# Patient Record
Sex: MALE | Race: BLACK OR AFRICAN AMERICAN | NOT HISPANIC OR LATINO | Employment: UNEMPLOYED | URBAN - METROPOLITAN AREA
[De-identification: names, ages, dates, MRNs, and addresses within clinical notes are randomized per-mention and may not be internally consistent; named-entity substitution may affect disease eponyms.]

---

## 2022-12-09 ENCOUNTER — HOSPITAL ENCOUNTER (EMERGENCY)
Facility: HOSPITAL | Age: 72
Discharge: HOME | End: 2022-12-10
Attending: EMERGENCY MEDICINE
Payer: COMMERCIAL

## 2022-12-09 DIAGNOSIS — R56.9 SEIZURE: Primary | ICD-10-CM

## 2022-12-09 DIAGNOSIS — N39.0 URINARY TRACT INFECTION WITHOUT HEMATURIA, SITE UNSPECIFIED: ICD-10-CM

## 2022-12-09 LAB
ALBUMIN SERPL BCP-MCNC: 3.7 G/DL (ref 3.5–5.2)
ALP SERPL-CCNC: 102 U/L (ref 55–135)
ALT SERPL W/O P-5'-P-CCNC: 33 U/L (ref 10–44)
ANION GAP SERPL CALC-SCNC: 7 MMOL/L (ref 8–16)
ANISOCYTOSIS BLD QL SMEAR: SLIGHT
AST SERPL-CCNC: 35 U/L (ref 10–40)
BACTERIA #/AREA URNS HPF: ABNORMAL /HPF
BASOPHILS # BLD AUTO: ABNORMAL K/UL (ref 0–0.2)
BASOPHILS NFR BLD: 0 % (ref 0–1.9)
BILIRUB SERPL-MCNC: 0.4 MG/DL (ref 0.1–1)
BILIRUB UR QL STRIP: NEGATIVE
BUN SERPL-MCNC: 21 MG/DL (ref 8–23)
CALCIUM SERPL-MCNC: 9.3 MG/DL (ref 8.7–10.5)
CHLORIDE SERPL-SCNC: 102 MMOL/L (ref 95–110)
CK SERPL-CCNC: 272 U/L (ref 20–200)
CLARITY UR: ABNORMAL
CO2 SERPL-SCNC: 25 MMOL/L (ref 23–29)
COLOR UR: YELLOW
CREAT SERPL-MCNC: 1.2 MG/DL (ref 0.5–1.4)
DIFFERENTIAL METHOD: ABNORMAL
EOSINOPHIL # BLD AUTO: ABNORMAL K/UL (ref 0–0.5)
EOSINOPHIL NFR BLD: 1 % (ref 0–8)
ERYTHROCYTE [DISTWIDTH] IN BLOOD BY AUTOMATED COUNT: 14 % (ref 11.5–14.5)
EST. GFR  (NO RACE VARIABLE): >60 ML/MIN/1.73 M^2
GIANT PLATELETS BLD QL SMEAR: PRESENT
GLUCOSE SERPL-MCNC: 90 MG/DL (ref 70–110)
GLUCOSE UR QL STRIP: NEGATIVE
HCT VFR BLD AUTO: 35.8 % (ref 40–54)
HGB BLD-MCNC: 11.7 G/DL (ref 14–18)
HGB UR QL STRIP: ABNORMAL
HYALINE CASTS #/AREA URNS LPF: 0 /LPF
IMM GRANULOCYTES # BLD AUTO: ABNORMAL K/UL (ref 0–0.04)
IMM GRANULOCYTES NFR BLD AUTO: ABNORMAL % (ref 0–0.5)
KETONES UR QL STRIP: NEGATIVE
LACTATE SERPL-SCNC: 1.7 MMOL/L (ref 0.5–2.2)
LEUKOCYTE ESTERASE UR QL STRIP: ABNORMAL
LYMPHOCYTES # BLD AUTO: ABNORMAL K/UL (ref 1–4.8)
LYMPHOCYTES NFR BLD: 11 % (ref 18–48)
MCH RBC QN AUTO: 30.5 PG (ref 27–31)
MCHC RBC AUTO-ENTMCNC: 32.7 G/DL (ref 32–36)
MCV RBC AUTO: 93 FL (ref 82–98)
MICROSCOPIC COMMENT: ABNORMAL
MONOCYTES # BLD AUTO: ABNORMAL K/UL (ref 0.3–1)
MONOCYTES NFR BLD: 7 % (ref 4–15)
NEUTROPHILS NFR BLD: 81 % (ref 38–73)
NITRITE UR QL STRIP: NEGATIVE
NRBC BLD-RTO: 0 /100 WBC
OVALOCYTES BLD QL SMEAR: ABNORMAL
PH UR STRIP: 7 [PH] (ref 5–8)
PLATELET # BLD AUTO: 279 K/UL (ref 150–450)
PLATELET BLD QL SMEAR: ABNORMAL
PMV BLD AUTO: 11.1 FL (ref 9.2–12.9)
POIKILOCYTOSIS BLD QL SMEAR: SLIGHT
POTASSIUM SERPL-SCNC: 5.1 MMOL/L (ref 3.5–5.1)
PROT SERPL-MCNC: 8.8 G/DL (ref 6–8.4)
PROT UR QL STRIP: ABNORMAL
RBC # BLD AUTO: 3.84 M/UL (ref 4.6–6.2)
RBC #/AREA URNS HPF: 16 /HPF (ref 0–4)
SODIUM SERPL-SCNC: 134 MMOL/L (ref 136–145)
SP GR UR STRIP: 1.01 (ref 1–1.03)
SQUAMOUS #/AREA URNS HPF: 1 /HPF
TROPONIN I SERPL DL<=0.01 NG/ML-MCNC: 0.06 NG/ML (ref 0–0.03)
TROPONIN I SERPL DL<=0.01 NG/ML-MCNC: 0.17 NG/ML (ref 0–0.03)
URN SPEC COLLECT METH UR: ABNORMAL
UROBILINOGEN UR STRIP-ACNC: NEGATIVE EU/DL
WBC # BLD AUTO: 18.97 K/UL (ref 3.9–12.7)
WBC #/AREA URNS HPF: >100 /HPF (ref 0–5)
WBC CLUMPS URNS QL MICRO: ABNORMAL

## 2022-12-09 PROCEDURE — 96375 TX/PRO/DX INJ NEW DRUG ADDON: CPT

## 2022-12-09 PROCEDURE — 99285 EMERGENCY DEPT VISIT HI MDM: CPT | Mod: 25

## 2022-12-09 PROCEDURE — 82550 ASSAY OF CK (CPK): CPT | Performed by: EMERGENCY MEDICINE

## 2022-12-09 PROCEDURE — 87086 URINE CULTURE/COLONY COUNT: CPT | Performed by: EMERGENCY MEDICINE

## 2022-12-09 PROCEDURE — 63600175 PHARM REV CODE 636 W HCPCS: Performed by: EMERGENCY MEDICINE

## 2022-12-09 PROCEDURE — 81000 URINALYSIS NONAUTO W/SCOPE: CPT | Performed by: EMERGENCY MEDICINE

## 2022-12-09 PROCEDURE — 87088 URINE BACTERIA CULTURE: CPT | Performed by: EMERGENCY MEDICINE

## 2022-12-09 PROCEDURE — 80053 COMPREHEN METABOLIC PANEL: CPT | Performed by: EMERGENCY MEDICINE

## 2022-12-09 PROCEDURE — 93010 ELECTROCARDIOGRAM REPORT: CPT | Mod: ,,, | Performed by: INTERNAL MEDICINE

## 2022-12-09 PROCEDURE — 93010 EKG 12-LEAD: ICD-10-PCS | Mod: ,,, | Performed by: INTERNAL MEDICINE

## 2022-12-09 PROCEDURE — 84484 ASSAY OF TROPONIN QUANT: CPT | Performed by: EMERGENCY MEDICINE

## 2022-12-09 PROCEDURE — 93005 ELECTROCARDIOGRAM TRACING: CPT

## 2022-12-09 PROCEDURE — 87077 CULTURE AEROBIC IDENTIFY: CPT | Performed by: EMERGENCY MEDICINE

## 2022-12-09 PROCEDURE — 25000003 PHARM REV CODE 250: Performed by: EMERGENCY MEDICINE

## 2022-12-09 PROCEDURE — 87186 SC STD MICRODIL/AGAR DIL: CPT | Performed by: EMERGENCY MEDICINE

## 2022-12-09 PROCEDURE — 83605 ASSAY OF LACTIC ACID: CPT | Performed by: EMERGENCY MEDICINE

## 2022-12-09 PROCEDURE — 85025 COMPLETE CBC W/AUTO DIFF WBC: CPT | Performed by: EMERGENCY MEDICINE

## 2022-12-09 PROCEDURE — 96365 THER/PROPH/DIAG IV INF INIT: CPT

## 2022-12-09 RX ORDER — FLUOXETINE HYDROCHLORIDE 20 MG/1
20 CAPSULE ORAL DAILY
COMMUNITY

## 2022-12-09 RX ORDER — TAMSULOSIN HYDROCHLORIDE 0.4 MG/1
CAPSULE ORAL DAILY
COMMUNITY

## 2022-12-09 RX ORDER — SENNOSIDES 8.6 MG/1
1 TABLET ORAL NIGHTLY
COMMUNITY

## 2022-12-09 RX ORDER — MELOXICAM 15 MG/1
15 TABLET ORAL DAILY
COMMUNITY

## 2022-12-09 RX ORDER — AMLODIPINE BESYLATE 5 MG/1
5 TABLET ORAL DAILY
COMMUNITY

## 2022-12-09 RX ORDER — CITALOPRAM 20 MG/1
20 TABLET, FILM COATED ORAL DAILY
COMMUNITY

## 2022-12-09 RX ORDER — MULTIVITAMIN
1 TABLET ORAL DAILY
COMMUNITY

## 2022-12-09 RX ORDER — CLOPIDOGREL BISULFATE 75 MG/1
75 TABLET ORAL DAILY
COMMUNITY

## 2022-12-09 RX ORDER — CYCLOBENZAPRINE HCL 5 MG
5 TABLET ORAL 3 TIMES DAILY PRN
COMMUNITY

## 2022-12-09 RX ORDER — CARVEDILOL 25 MG/1
25 TABLET ORAL 2 TIMES DAILY WITH MEALS
COMMUNITY

## 2022-12-09 RX ORDER — FAMOTIDINE 20 MG/1
20 TABLET, FILM COATED ORAL 2 TIMES DAILY
COMMUNITY

## 2022-12-09 RX ORDER — LOSARTAN POTASSIUM 50 MG/1
50 TABLET ORAL DAILY
COMMUNITY

## 2022-12-09 RX ORDER — FINASTERIDE 5 MG/1
5 TABLET, FILM COATED ORAL DAILY
COMMUNITY

## 2022-12-09 RX ORDER — LEVETIRACETAM 500 MG/5ML
1000 INJECTION, SOLUTION, CONCENTRATE INTRAVENOUS
Status: COMPLETED | OUTPATIENT
Start: 2022-12-09 | End: 2022-12-09

## 2022-12-09 RX ORDER — ATORVASTATIN CALCIUM 40 MG/1
40 TABLET, FILM COATED ORAL NIGHTLY
COMMUNITY

## 2022-12-09 RX ADMIN — SODIUM CHLORIDE 1000 ML: 0.9 INJECTION, SOLUTION INTRAVENOUS at 07:12

## 2022-12-09 RX ADMIN — CEFTRIAXONE SODIUM 1 G: 1 INJECTION, POWDER, FOR SOLUTION INTRAMUSCULAR; INTRAVENOUS at 07:12

## 2022-12-09 RX ADMIN — LEVETIRACETAM 1000 MG: 500 INJECTION, SOLUTION INTRAVENOUS at 08:12

## 2022-12-10 VITALS
BODY MASS INDEX: 19.64 KG/M2 | HEART RATE: 74 BPM | TEMPERATURE: 98 F | OXYGEN SATURATION: 99 % | DIASTOLIC BLOOD PRESSURE: 74 MMHG | HEIGHT: 74 IN | RESPIRATION RATE: 18 BRPM | WEIGHT: 153 LBS | SYSTOLIC BLOOD PRESSURE: 135 MMHG

## 2022-12-10 LAB — TROPONIN I SERPL DL<=0.01 NG/ML-MCNC: 0.04 NG/ML (ref 0–0.03)

## 2022-12-10 PROCEDURE — 93010 ELECTROCARDIOGRAM REPORT: CPT | Mod: ,,, | Performed by: INTERNAL MEDICINE

## 2022-12-10 PROCEDURE — 93010 EKG 12-LEAD: ICD-10-PCS | Mod: ,,, | Performed by: INTERNAL MEDICINE

## 2022-12-10 PROCEDURE — 93005 ELECTROCARDIOGRAM TRACING: CPT

## 2022-12-10 PROCEDURE — 84484 ASSAY OF TROPONIN QUANT: CPT | Performed by: EMERGENCY MEDICINE

## 2022-12-10 RX ORDER — LEVETIRACETAM 500 MG/1
500 TABLET ORAL 2 TIMES DAILY
Qty: 60 TABLET | Refills: 11 | Status: SHIPPED | OUTPATIENT
Start: 2022-12-10 | End: 2023-12-10

## 2022-12-10 RX ORDER — CEPHALEXIN 500 MG/1
500 CAPSULE ORAL 4 TIMES DAILY
Qty: 20 CAPSULE | Refills: 0 | Status: SHIPPED | OUTPATIENT
Start: 2022-12-10 | End: 2022-12-15

## 2022-12-10 NOTE — ED NOTES
Pt arrived via EMS from [facility] reports seizure that last 2 min .  EMS reports that he has no history of seizures.EMS reports he bit his tongue and lip during the seizure. Pt in non-verbal but looks directly in eye when nodding appropriately. Pt was able to urinate in urinal with assistance when asked to. Pt is pleasant and appears to be comfortable. He is not in any distress. No seizure has accrued during this visit.

## 2022-12-10 NOTE — ED PROVIDER NOTES
Encounter Date: 12/9/2022       History     Chief Complaint   Patient presents with    Seizures     Nursing home reports witnessed grand mal seizure with no h/o seizure disorder. States lasted about 2 min, bit lower lip. Patient is a DNR. On arrival, awake, alert.     HPI    72-year-old male with past medical history of hyperlipidemia, stroke, anemia, hemiplegia and hemiparesis of the right side, depression, presents with seizure activity at nursing home.  Reportedly was witnessed full body tonic-clonic seizure lasting around 2 minutes.  Reportedly had bit his lip and tongue during the event, was confused afterwards, and was back to baseline by the time EMS was there to pick him up.  Reports no history of seizure activity previously, reportedly is nonverbal at baseline however is able to follow commands and comprehends fully.  Currently denies any abdominal pain, chest pain, shortness of breath, headache, nausea/vomiting/diarrhea, denies any further complaints.    Review of patient's allergies indicates:  Not on File  Past Medical History:   Diagnosis Date    BPH (benign prostatic hyperplasia)     Hypercholesterolemia     Stroke      No past surgical history on file.  No family history on file.     Review of Systems   Constitutional:  Positive for activity change.   HENT: Negative.     Eyes: Negative.    Respiratory: Negative.     Cardiovascular: Negative.    Gastrointestinal: Negative.    Genitourinary: Negative.    Musculoskeletal: Negative.    Skin: Negative.    Neurological:  Positive for seizures.     Physical Exam     Initial Vitals [12/09/22 1745]   BP Pulse Resp Temp SpO2   138/80 74 16 98.3 °F (36.8 °C) 97 %      MAP       --         Physical Exam    Nursing note and vitals reviewed.  Constitutional: He is not diaphoretic. He appears distressed (moderately).   HENT:   Head: Normocephalic and atraumatic.   Nose: Nose normal.   Noted small area of ecchymosis and slight edema to right lower anterior portion of  lip and over right lateral tongue   Eyes: EOM are normal. Pupils are equal, round, and reactive to light.   Neck: Neck supple. No tracheal deviation present. No JVD present.   Normal range of motion.  Cardiovascular:  Normal rate, regular rhythm, normal heart sounds and intact distal pulses.           Pulmonary/Chest: Breath sounds normal. No respiratory distress. He has no wheezes. He has no rhonchi. He has no rales.   Abdominal: Abdomen is soft. Bowel sounds are normal. He exhibits no distension. There is no abdominal tenderness. There is no rebound and no guarding.   Musculoskeletal:         General: No tenderness or edema.      Cervical back: Normal range of motion and neck supple.     Neurological: He is alert.   Nonverbal at baseline, able to follow simple commands, full range of motion of left upper extremity and left lower extremity, contractured right upper extremity with no movement noted, atrophic right lower extremity.   Skin: Skin is warm and dry. Capillary refill takes less than 2 seconds. No rash noted. No erythema.       ED Course   Procedures  Labs Reviewed   CBC W/ AUTO DIFFERENTIAL - Abnormal; Notable for the following components:       Result Value    WBC 18.97 (*)     RBC 3.84 (*)     Hemoglobin 11.7 (*)     Hematocrit 35.8 (*)     Gran % 81.0 (*)     Lymph % 11.0 (*)     All other components within normal limits   COMPREHENSIVE METABOLIC PANEL - Abnormal; Notable for the following components:    Sodium 134 (*)     Total Protein 8.8 (*)     Anion Gap 7 (*)     All other components within normal limits   URINALYSIS, REFLEX TO URINE CULTURE - Abnormal; Notable for the following components:    Appearance, UA Cloudy (*)     Protein, UA 1+ (*)     Occult Blood UA 1+ (*)     Leukocytes, UA 3+ (*)     All other components within normal limits    Narrative:     Specimen Source->Urine   TROPONIN I - Abnormal; Notable for the following components:    Troponin I 0.059 (*)     All other components within  normal limits   URINALYSIS MICROSCOPIC - Abnormal; Notable for the following components:    RBC, UA 16 (*)     WBC, UA >100 (*)     WBC Clumps, UA Many (*)     Bacteria Many (*)     All other components within normal limits    Narrative:     Specimen Source->Urine   TROPONIN I - Abnormal; Notable for the following components:    Troponin I 0.172 (*)     All other components within normal limits   CK - Abnormal; Notable for the following components:     (*)     All other components within normal limits   TROPONIN I - Abnormal; Notable for the following components:    Troponin I 0.035 (*)     All other components within normal limits   CULTURE, URINE   LACTIC ACID, PLASMA   CK          Imaging Results              CT Head Without Contrast (Final result)  Result time 12/09/22 19:09:28      Final result by Julissa Payne MD (12/09/22 19:09:28)                   Impression:      No evidence of acute intracranial hemorrhage, major arterial infarct, mass or mass effect.    Deep white matter low density as seen with microvascular chronic ischemic changes.    Left temporoparietal lobe as well as left cerebellar hemisphere encephalomalacia.    Senescent atrophic changes.      Electronically signed by: Julissa Payne  Date:    12/09/2022  Time:    19:09               Narrative:    EXAMINATION:  CT HEAD WITHOUT CONTRAST    CLINICAL HISTORY:  Seizure, new-onset, no history of trauma;    TECHNIQUE:  Low dose axial images were obtained through the head.  Coronal and sagittal reformations were also performed. Contrast was not administered.    COMPARISON:  None.    FINDINGS:  There is encephalomalacia in the left cerebellar hemisphere as well as the left temporoparietal lobe in the MCA distribution with compensatory enlargement of the left lateral ventricle.    There is no evidence of acute intracranial hemorrhage or hematoma.  The remainder of the gray-white matter junction differentiation is intact with no evidence of  acute major arterial infarct.  There is no focal appreciable mass or mass effect.  There is no evidence of hydrocephalus.    There is patchy low density in the periventricular deep white matter as seen with microvascular chronic ischemic changes.    The posterior fossa structures otherwise grossly appear intact as well as the pituitary gland allowing for artifact.  The visualized paranasal sinuses, mastoid air cells and middle ears appear clear.  Bony calvarium grossly appears intact.                                       X-Ray Chest 1 View (Final result)  Result time 12/09/22 18:56:40      Final result by Julissa Payne MD (12/09/22 18:56:40)                   Impression:      Limited exam secondary to positioning as described.    There is a 6 mm in maximal length ovoid nodular pulmonary focus or sclerotic left rib focus as described above.  Lungs otherwise appear clear.  Further evaluation can be made with nonemergent CT to exclude pulmonary nodule.    Otherwise unremarkable chest as imaged.  The patient's physician Dr. Sewell and care team nurses were notified of this incidental finding.      Electronically signed by: Julissa Payne  Date:    12/09/2022  Time:    18:56               Narrative:    EXAMINATION:  XR CHEST 1 VIEW    CLINICAL HISTORY:  Chest Pain;    TECHNIQUE:  Single frontal view of the chest was performed.    COMPARISON:  None    FINDINGS:  Patient is rotated to the right and kyphotic limiting assessment particularly of the right lung as the mediastinum overlaps the medial right lung.  There is no evidence of cardiomegaly, appreciable pneumothorax or pleural effusion.  There is an ovoid nodular focus overlying the left anterior 3rd and left posterior 6th rib possibly a rib sclerotic focus or a 6 mm nodule over the right lateral lung.  Otherwise lungs appear clear as imaged.  Osseous structures grossly appear intact.                                       Medications   cefTRIAXone (ROCEPHIN) 1  g/50 mL D5W IVPB (0 g Intravenous Stopped 12/9/22 2008)   sodium chloride 0.9% bolus 1,000 mL (0 mLs Intravenous Stopped 12/9/22 2009)   levETIRAcetam injection 1,000 mg (1,000 mg Intravenous Given 12/9/22 2013)                      MDM:    72-year-old male with past medical history of hyperlipidemia, stroke, anemia, hemiplegia and hemiparesis of the right side, depression, presents with seizure activity at nursing home.  Physical exam as noted above, ED workup notable for EKG showing normal sinus rhythm rate of 70 beats per minute, LVH present,  MS, nonspecific ST and T-wave changes noted throughout.  Troponin 0.059, lactate 1.7, chest x-ray showing 60 mm ovoid pulmonary focus the left rib, CT head showing left temporoparietal lobe and left cerebellar hemisphere encephalomalacia with additional atrophic changes.  Lactate 1.7, CMP within normal limits, WBC-18.97, hemoglobin 11.7, urinalysis showing many bacteria, 3+ leukocytes, .  Patient presentation consistent with new onset seizure likely secondary to noted changes on his CT scan today as well as underlying urinary tract infection.  Mild elevation in troponin trended with decrease relatively quickly without any reported chest pain, stable appearing EKG in no additional findings to suggest ultimately cardiac etiology, arrhythmia, PE, aortic dissection, or any further surgical medical emergency.  Patient is of note as well a DNR at this time.  Treated with IV fluids, Rocephin, Keppra will be started at 500 mg b.i.d..  Patient will need follow-up with Neurology outpatient, stable for discharge back to the nursing home at this time.  Patient reassessed with stable vitals, no further complaints and request to be discharged.  Patient in understanding of plan.  Pt discharged to nursing home improved and stable.              Clinical Impression:   Final diagnoses:  [R56.9] Seizure (Primary)  [N39.0] Urinary tract infection without hematuria, site  unspecified        ED Disposition Condition    Discharge Stable          ED Prescriptions       Medication Sig Dispense Start Date End Date Auth. Provider    cephALEXin (KEFLEX) 500 MG capsule Take 1 capsule (500 mg total) by mouth 4 (four) times daily. for 5 days 20 capsule 12/10/2022 12/15/2022 Aakash Sewell MD    levETIRAcetam (KEPPRA) 500 MG Tab Take 1 tablet (500 mg total) by mouth 2 (two) times daily. 60 tablet 12/10/2022 12/10/2023 Aakash Sewell MD          Follow-up Information       Follow up With Specialties Details Why Contact Info    [place] - Emergency Dept Emergency Medicine Go to  If symptoms worsen [address] [facility] [address] [phone]    [facility] NEUROLOGY Neurology Schedule an appointment as soon as possible for a visit  As needed [address] [facility] [address]  [phone]             Aakash Sewell MD  12/10/22 0306

## 2022-12-12 LAB — BACTERIA UR CULT: ABNORMAL

## 2023-02-18 ENCOUNTER — HOSPITAL ENCOUNTER (EMERGENCY)
Facility: HOSPITAL | Age: 73
Discharge: NURSING, REHAB OR LONG-TERM CARE | End: 2023-02-18
Attending: EMERGENCY MEDICINE
Payer: COMMERCIAL

## 2023-02-18 VITALS
SYSTOLIC BLOOD PRESSURE: 119 MMHG | RESPIRATION RATE: 19 BRPM | TEMPERATURE: 98 F | DIASTOLIC BLOOD PRESSURE: 66 MMHG | BODY MASS INDEX: 19.64 KG/M2 | HEART RATE: 74 BPM | WEIGHT: 153 LBS | HEIGHT: 74 IN | OXYGEN SATURATION: 100 %

## 2023-02-18 DIAGNOSIS — R07.9 CHEST PAIN, UNSPECIFIED TYPE: Primary | ICD-10-CM

## 2023-02-18 DIAGNOSIS — R07.9 CHEST PAIN: ICD-10-CM

## 2023-02-18 LAB
ALBUMIN SERPL BCP-MCNC: 2.9 G/DL (ref 3.5–5.2)
ALP SERPL-CCNC: 87 U/L (ref 55–135)
ALT SERPL W/O P-5'-P-CCNC: 30 U/L (ref 10–44)
ANION GAP SERPL CALC-SCNC: 5 MMOL/L (ref 8–16)
ANISOCYTOSIS BLD QL SMEAR: SLIGHT
AST SERPL-CCNC: 24 U/L (ref 10–40)
BASOPHILS # BLD AUTO: 0.07 K/UL (ref 0–0.2)
BASOPHILS NFR BLD: 0.5 % (ref 0–1.9)
BILIRUB SERPL-MCNC: 0.2 MG/DL (ref 0.1–1)
BUN SERPL-MCNC: 24 MG/DL (ref 8–23)
CALCIUM SERPL-MCNC: 8.7 MG/DL (ref 8.7–10.5)
CHLORIDE SERPL-SCNC: 107 MMOL/L (ref 95–110)
CO2 SERPL-SCNC: 23 MMOL/L (ref 23–29)
CREAT SERPL-MCNC: 1.3 MG/DL (ref 0.5–1.4)
DIFFERENTIAL METHOD: ABNORMAL
EOSINOPHIL # BLD AUTO: 0.3 K/UL (ref 0–0.5)
EOSINOPHIL NFR BLD: 1.8 % (ref 0–8)
ERYTHROCYTE [DISTWIDTH] IN BLOOD BY AUTOMATED COUNT: 14.2 % (ref 11.5–14.5)
EST. GFR  (NO RACE VARIABLE): 59 ML/MIN/1.73 M^2
GLUCOSE SERPL-MCNC: 86 MG/DL (ref 70–110)
HCT VFR BLD AUTO: 27.8 % (ref 40–54)
HGB BLD-MCNC: 9.1 G/DL (ref 14–18)
HYPOCHROMIA BLD QL SMEAR: ABNORMAL
IMM GRANULOCYTES # BLD AUTO: 0.05 K/UL (ref 0–0.04)
IMM GRANULOCYTES NFR BLD AUTO: 0.4 % (ref 0–0.5)
LYMPHOCYTES # BLD AUTO: 2.9 K/UL (ref 1–4.8)
LYMPHOCYTES NFR BLD: 20.1 % (ref 18–48)
MCH RBC QN AUTO: 30.2 PG (ref 27–31)
MCHC RBC AUTO-ENTMCNC: 32.7 G/DL (ref 32–36)
MCV RBC AUTO: 92 FL (ref 82–98)
MONOCYTES # BLD AUTO: 1.8 K/UL (ref 0.3–1)
MONOCYTES NFR BLD: 12.8 % (ref 4–15)
NEUTROPHILS # BLD AUTO: 9.2 K/UL (ref 1.8–7.7)
NEUTROPHILS NFR BLD: 64.4 % (ref 38–73)
NRBC BLD-RTO: 0 /100 WBC
PLATELET # BLD AUTO: 207 K/UL (ref 150–450)
PLATELET BLD QL SMEAR: ABNORMAL
PMV BLD AUTO: 10.9 FL (ref 9.2–12.9)
POLYCHROMASIA BLD QL SMEAR: ABNORMAL
POTASSIUM SERPL-SCNC: 4.6 MMOL/L (ref 3.5–5.1)
PROT SERPL-MCNC: 7.7 G/DL (ref 6–8.4)
RBC # BLD AUTO: 3.01 M/UL (ref 4.6–6.2)
SODIUM SERPL-SCNC: 135 MMOL/L (ref 136–145)
TROPONIN I SERPL DL<=0.01 NG/ML-MCNC: 0.01 NG/ML (ref 0–0.03)
TROPONIN I SERPL DL<=0.01 NG/ML-MCNC: <0.006 NG/ML (ref 0–0.03)
WBC # BLD AUTO: 14.21 K/UL (ref 3.9–12.7)

## 2023-02-18 PROCEDURE — 85025 COMPLETE CBC W/AUTO DIFF WBC: CPT | Performed by: EMERGENCY MEDICINE

## 2023-02-18 PROCEDURE — 93005 ELECTROCARDIOGRAM TRACING: CPT

## 2023-02-18 PROCEDURE — 93010 ELECTROCARDIOGRAM REPORT: CPT | Mod: ,,, | Performed by: INTERNAL MEDICINE

## 2023-02-18 PROCEDURE — 84484 ASSAY OF TROPONIN QUANT: CPT | Mod: 91 | Performed by: EMERGENCY MEDICINE

## 2023-02-18 PROCEDURE — 99285 EMERGENCY DEPT VISIT HI MDM: CPT | Mod: 25

## 2023-02-18 PROCEDURE — 80053 COMPREHEN METABOLIC PANEL: CPT | Performed by: EMERGENCY MEDICINE

## 2023-02-18 PROCEDURE — 93010 EKG 12-LEAD: ICD-10-PCS | Mod: ,,, | Performed by: INTERNAL MEDICINE

## 2023-02-18 NOTE — ED PROVIDER NOTES
"NAME:  Wu Hampton  CSN:     [id number]  MRN:    [id number]  ADMIT DATE: 2/18/2023        eMERGENCY dEPARTMENT eNCOUnter    CHIEF COMPLAINT    Chief Complaint   Patient presents with    Chest Pain     Nursing home reports that patient grabbed chest while eating lunch - when they asked him if he was having chest pain, he said yes. Patient is non-verbal.        HPI      Wu Hampton is a 72 y.o. male who presents to the ED for possible chest pain.  The patient is is nonverbal nursing home reports the patient grabbed his chest while eating lunch and when asked if he was having chest pain he nodded yes.  I am unable to obtain any further history because the patient can not describe to me his symptoms          ALLERGIES    Review of patient's allergies indicates:  No Known Allergies    PAST MEDICAL HISTORY  Past Medical History:   Diagnosis Date    BPH (benign prostatic hyperplasia)     Hypercholesterolemia     Stroke        SURGICAL HISTORY    No past surgical history on file.    SOCIAL HISTORY    Social History     Socioeconomic History    Marital status: Single       FAMILY HISTORY    No family history on file.    REVIEW OF SYSTEMS   ROS  All Systems otherwise negative except as noted in the History of Present Illness.        PHYSICAL EXAM    Reviewed Triage Note  VITAL SIGNS:   ED Triage Vitals   Enc Vitals Group      BP 02/18/23 1305 113/87      Pulse 02/18/23 1305 80      Resp 02/18/23 1305 18      Temp 02/18/23 1305 98.7 °F (37.1 °C)      Temp src 02/18/23 1305 Oral      SpO2 02/18/23 1305 98 %      Weight 02/18/23 1307 153 lb      Height 02/18/23 1307 6' 2"      Head Circumference --       Peak Flow --       Pain Score --       Pain Loc --       Pain Edu? --       Excl. in ? --        Patient Vitals for the past 24 hrs:   BP Temp Temp src Pulse Resp SpO2 Height Weight   02/18/23 1307 -- -- -- -- -- -- 6' 2" (1.88 m) 69.4 kg (153 lb)   02/18/23 1305 113/87 98.7 °F (37.1 °C) Oral 80 18 98 % -- -- "           Physical Exam    Constitutional:  Well-developed, well-nourished. No acute distress  HENT:  Normocephalic, atraumatic.  Eyes:  EOMI. Conjunctiva normal without discharge.   Neck: Normal range of motion.No stridor. No meningismus.   Respiratory:  Normal breath sounds bilaterally.  No respiratory distress, retractions, or conversational dyspnea. No wheezing. No rhonchi. No rales.   Cardiovascular:  Normal heart rate. Normal rhythm. No pitting lower extremity edema.   GI:  Abdomen soft, non-distended, non-tender. Normal bowel sounds. No guarding, rigidity or rebound.    : No CVA tenderness.   Musculoskeletal:  No gross deformity or limited range of motion of all major joints. No palpable bony deformity. No tenderness to palpation.  Integument:  Warm and dry. No rash.  Neurologic:  awake and alert, cn 2-12 appear intact          LABS  Pertinent labs reviewed. (See chart for details)   Labs Reviewed   CBC W/ AUTO DIFFERENTIAL - Abnormal; Notable for the following components:       Result Value    WBC 14.21 (*)     RBC 3.01 (*)     Hemoglobin 9.1 (*)     Hematocrit 27.8 (*)     Gran # (ANC) 9.2 (*)     Immature Grans (Abs) 0.05 (*)     Mono # 1.8 (*)     All other components within normal limits   COMPREHENSIVE METABOLIC PANEL - Abnormal; Notable for the following components:    Sodium 135 (*)     BUN 24 (*)     Albumin 2.9 (*)     Anion Gap 5 (*)     eGFR 59 (*)     All other components within normal limits   TROPONIN I   TROPONIN I         RADIOLOGY    Imaging Results              X-Ray Chest AP Portable (Final result)  Result time 02/18/23 14:05:56      Final result by Brody Villagomez MD (02/18/23 14:05:56)                   Impression:      1. Stable appearing chronic interstitial findings, no large focal consolidation.      Electronically signed by: Brody Villagomez MD  Date:    02/18/2023  Time:    14:05               Narrative:    EXAMINATION:  XR CHEST AP PORTABLE    CLINICAL  HISTORY:  cp;    TECHNIQUE:  Single frontal view of the chest was performed.    COMPARISON:  02/09/2022    FINDINGS:  Patient is rotated.  The cardiomediastinal silhouette is not enlarged, stable in configuration as compared to the previous exam..  There is no pleural effusion.  The trachea is midline.  The lungs are symmetrically expanded bilaterally with coarse interstitial attenuation bilaterally.  There is a possible calcified granuloma projected over the left midlung zone, stable..  No large focal consolidation seen.  There is no pneumothorax.  The osseous structures are remarkable for degenerative changes..                                      PROCEDURES    Procedures      EKG     Interpreted by ERP:     EKG Readings: (Independently Interpreted)   Rhythm: Normal Sinus Rhythm. Heart Rate: 72. Ectopy: No Ectopy. Conduction: Normal. ST Segments: Normal ST Segments. Clinical Impression: Normal Sinus Rhythm and Left Ventricular Hypertrophy (LDH)     ED COURSE & MEDICAL DECISION MAKING    Pertinent & Imaging studies reviewed. (See chart for details and specific orders.)        Medications - No data to display       Pt has 2 sets negative troponin. Leukocytosis noted but pt is afebrile.        DISPOSITION  Patient discharged in stable condition at No discharge date for patient encounter.      DISCHARGE INSTRUCTIONS & MEDS    @DISCHARGEMEDSLIST(<NOROUTINE> error)@      New Prescriptions    No medications on file           FINAL IMPRESSION    1. Chest pain, unspecified type    2. Chest pain              Blood Pressure Follow-Up Advised  Patient advised to follow up with PCP within 3-5 days for blood pressure re-check if blood pressure is equal to or greater than 120/80.         Critical care time spent with this patient (not including separately billable items) was  0 minutes.     DISCLAIMER: This note was prepared with Dragon NaturallySpeaking voice recognition transcription software. Garbled syntax, mangled pronouns,  and other bizarre constructions may be attributed to that software system.      Dominik Diaz MD  02/18/2023  5:43 PM           Dominik Diaz MD  02/18/23 5605

## 2023-03-09 ENCOUNTER — TELEPHONE (OUTPATIENT)
Dept: EMERGENCY MEDICINE | Facility: HOSPITAL | Age: 73
End: 2023-03-09
Payer: COMMERCIAL

## 2023-05-11 NOTE — ED NOTES
Please call to schedule an appointment to establish care with a primary care physician.    I recommend Louisa Watkins NP  [facility]  [address] [address]  Office: [phone]    If you would like to search for a primary care provider other than the above listed please visit:  [email or web address]    Before beginning the following exercises, you must first be able to locate your pelvic floor muscles. Here’s how to do it:   Begin lying down with your knees bent and your feet on the floor.  Squeeze and lift the rectal and vaginal areas as if you were trying to stop yourself from urinating. Hold for 5-10 seconds and then release.  You should feel a closing feeling in your genital area when you squeeze. Imagine yourself drawing energy from the base of the pelvic bowl all the way up your body and through the crown of your head.  Repeat for 5 reps.  Use this Kegel sensation throughout the following 7 exercises.    7 Exercises That Strengthen Pelvic Floor  Bridge  3 reps    This pelvic exercise helps to strengthen the pelvic floor, core, and hamstrings.   Start by lying down with your knees bent and your feet on the floor. Place your arms down alongside your body with your palms facing down.  Engage your pelvic floor. Then, inhale to lift your hips up towards the ceiling. Hold for 20 seconds and then exhale to slowly release your hips back down.  Repeat for 3 reps.  Shifting Plank  15 reps    This exercise strengthens your pelvic floor and the other surrounding core muscles.   Start lying on the floor on your belly. Prop yourself up onto your forearms, straighten your legs and tuck your toes under, coming into a forearm plank.  Draw your navel up and in and engage your pelvic floor. Your shoulders should be stacked over your elbows and your hips should be in line with your shoulders.  Staying in your plank shape, inhale to shift your shoulders in front of your  Pt cleaned and changed.   elbows, coming high onto the balls of your feet.  Exhale to shift your shoulders back over your elbows, pressing your heels back.  Repeat for 15 reps.  Wall Sit  60 sec    This exercise helps to strengthen the pelvic floor, core, and legs.   Stand facing away from a wall.  Place your back against the wall and then walk your feet out in front of you so that the wall is supporting you.  Bend your knees until they are at 90-degree angles and engage your pelvic floor. Keep your navel drawing up and in towards your spine so that your lower back is pressing into the wall.  Reach your arms straight out in front of you hold for 60 seconds, then release.  Split Tabletop  15 reps    This exercise strengthens the pelvic floor and core muscles.   Start by lying down with your knees bent and your feet on the floor. Place your arms down alongside your body with your palms facing down.  Engage your pelvic floor and lift your feet off the ground. Parallel your shins to the ground so that your knees are at a 90-degree angle.  Keeping your knees at 90-degree angles, inhale to separate your thighs into a straddle.  Exhale to squeeze your thighs back together and contract your pelvic floor.  Repeat for 15 reps.  Tabletop Pelvic Tilts  15 reps    These pelvic tilts help you to strengthen the pelvic floor.   Begin on your hands and knees in a tabletop position, with your shoulders stacked over your wrists and your hips over your knees.  Inhale to round your lower back, tilting your tailbone down. Tuck your chin, draw your navel up and in, and contract your pelvic floor muscles.  Exhale to come back to center.  Repeat for 15 reps.  Bird Dog  12 reps per side    This exercise strengthens the pelvic floor and the rest of the core muscles, improving balance and coordination.   Start in a tabletop position on your hands and knees. Engage your pelvic floor.  Shift your weight onto your left hand and your right knee. Then, inhale to reach  your right arm forward and your left leg back.  Pause for a moment to balance, then exhale to place your right hand and your left knee back down to the starting position.  Shift your weight onto your right hand and your left knee. Then, inhale to reach your left arm forward and your right leg back.  Pause for a moment, then exhale to place your left hand and right knee back down.  Continue alternating for 12 reps.  Dead Bugs Crunch  15 reps    This exercise strengthens the pelvic floor and abdominal muscles.   Start by lying down with your knees bent and your heels on the floor. Extend your arms back behind your head and engage your pelvic floor.  On an exhale, draw your knees into your chest and crunch your shoulders up off the ground. Reach towards your toes as you contract your pelvic floor.  Inhale to lower your arms and heels back to the starting position.  Repeat for 15 reps.

## 2024-08-30 ENCOUNTER — HOSPITAL ENCOUNTER (EMERGENCY)
Facility: HOSPITAL | Age: 74
Discharge: HOME | End: 2024-08-30
Attending: EMERGENCY MEDICINE
Payer: MEDICARE

## 2024-08-30 VITALS
OXYGEN SATURATION: 100 % | TEMPERATURE: 98 F | SYSTOLIC BLOOD PRESSURE: 120 MMHG | HEART RATE: 77 BPM | DIASTOLIC BLOOD PRESSURE: 70 MMHG
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
## 2024-08-30 PROCEDURE — 99284 EMERGENCY DEPT VISIT MOD MDM: CPT | Mod: 25

## 2024-08-30 NOTE — ED NOTES
Called the [facility], spoke with Blanka, she states he was found on the floor, unwitnessed fall, he denied any pain,no deformity noticed, but wanted to make sure everything is ok with him, he is non verbal, uses wheelchair to get around.

## 2024-08-30 NOTE — ED PROVIDER NOTES
"Encounter Date: 8/30/2024       History     Chief Complaint   Patient presents with    Fall     Nursing home reports unwitnessed fall during the night with no apparent injury but sent in to be checked. Presents awake, alert. Patient is poor historian and unable to contribute to history. States he does not remember falling. Denies pain.     Presents from nursing home after unwitnessed fall.  Patient is wheelchair-bound.  Per nursing home he is at his baseline mental status.  He has been eating sent here to be "checked out".    The history is provided by the nursing home.     Review of patient's allergies indicates:  No Known Allergies  Past Medical History:   Diagnosis Date    BPH (benign prostatic hyperplasia)     Hypercholesterolemia     Stroke      No past surgical history on file.  No family history on file.     Review of Systems   Unable to perform ROS: Other       Physical Exam     Initial Vitals [08/30/24 0738]   BP Pulse Resp Temp SpO2   111/73 76 -- 98.4 °F (36.9 °C) 98 %      MAP       --         Physical Exam    Vitals reviewed.  Constitutional: He appears well-developed. No distress.   HENT:   Head: Normocephalic and atraumatic.   Normal facial exam   Eyes: EOM are normal. Pupils are equal, round, and reactive to light.   Neck:   There is no tenderness to palpation, step-offs, or crepitus noted to the cervical spine   Cardiovascular:  Normal rate and regular rhythm.           No murmur heard.  Pulmonary/Chest: Breath sounds normal. He has no wheezes.   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   At baseline mental status   Skin: Skin is warm and dry. No rash noted.         ED Course   Procedures  Labs Reviewed - No data to display       Imaging Results              CT Cervical " Spine Without Contrast (Final result)  Result time 08/30/24 09:07:09      Final result by Lukasz Estrada DO (08/30/24 09:07:09)                   Impression:      CT head: Scattered areas of encephalomalacia most pronounced left MCA territory and left cerebellum compatible with remote infarcts similar to prior.  No evidence for acute intracranial findings specifically without evidence for acute intracranial hemorrhage or new abnormal parenchymal attenuation    CT cervical spine: Degenerative change of the cervical spine without evidence for acute fracture.  There is slight leftward rotatory subluxation of C1 on C2 with advanced degenerative change of the atlantal dental joint.    Further evaluation as warranted clinically.      Electronically signed by: Lukasz Estrada DO  Date:    08/30/2024  Time:    09:07               Narrative:    EXAMINATION:  CT HEAD WITHOUT CONTRAST; CT CERVICAL SPINE WITHOUT CONTRAST    CLINICAL HISTORY:  Head trauma, minor (Age >= 65y);; Neck trauma (Age >= 65y);    TECHNIQUE:  CT head: Multiple sequential 5 mm axial images of the head without contrast.  Coronal and sagittal reformatted imaging from the axial acquisition.    CT cervical spine: Multiple sequential 1.25 mm axial images of the cervical spine without contrast.  Coronal and sagittal reformatted imaging from the axial acquisition.    COMPARISON:  12/09/2022    FINDINGS:  CT head: Stable large encephalomalacia left cerebral hemisphere prior dominantly left MCA territory involving the left frontal parietal temporal and basal ganglia with additional components in the left posterior parietal/occipital lobe.  In addition there is continued large left and smaller right areas of cerebellar encephalomalacia    No evidence for acute intracranial hemorrhage.  There is hypoattenuation along the left cortical spinal track with small caliber left cerebral peduncle compatible with wallerian degeneration unchanged    Compensatory enlargement  ventricles sulci and cisterns without hydrocephalus similar to prior.  No significant new abnormal parenchymal attenuation.  Large opacification left sphenoid sinus with thickening sinus walls concerning for chronic sinus disease.  There is small lobular opacity right maxillary antra suggestive for mucous retention cyst    Partially visualized prominent left maxillary molar tooth periapical cyst    CT cervical spine: Cervical sagittal alignment is grossly within normal limits.  There is degenerative disc disease with intervertebral height loss and endplate degeneration at all levels.  Allowing for degenerative change there is no evidence for acute fracture of the cervical spine.  No consolidation visualized lung apices    Degenerative change of the atlantal dental joint.  There is slight leftward rotatory subluxation of C1 on C2 with anterior positioning of the right C1 lateral mass approximately 3-4 mm anterior with respect to the C2 lateral mass and posterior subluxation of the left C1 lateral mass approximately 2-3 mm.    Allowing for artifact from CT technique degenerative change most pronounced at C5/C6 level with posterior disc osteophyte, uncovertebral joint hypertrophy and facet arthropathy with probable mild central canal stenosis with moderate bilateral bony neural foraminal stenosis.                                       CT Head Without Contrast (Final result)  Result time 08/30/24 09:07:09      Final result by Lukasz Estrada DO (08/30/24 09:07:09)                   Impression:      CT head: Scattered areas of encephalomalacia most pronounced left MCA territory and left cerebellum compatible with remote infarcts similar to prior.  No evidence for acute intracranial findings specifically without evidence for acute intracranial hemorrhage or new abnormal parenchymal attenuation    CT cervical spine: Degenerative change of the cervical spine without evidence for acute fracture.  There is slight leftward  rotatory subluxation of C1 on C2 with advanced degenerative change of the atlantal dental joint.    Further evaluation as warranted clinically.      Electronically signed by: Lukasz Estrada DO  Date:    08/30/2024  Time:    09:07               Narrative:    EXAMINATION:  CT HEAD WITHOUT CONTRAST; CT CERVICAL SPINE WITHOUT CONTRAST    CLINICAL HISTORY:  Head trauma, minor (Age >= 65y);; Neck trauma (Age >= 65y);    TECHNIQUE:  CT head: Multiple sequential 5 mm axial images of the head without contrast.  Coronal and sagittal reformatted imaging from the axial acquisition.    CT cervical spine: Multiple sequential 1.25 mm axial images of the cervical spine without contrast.  Coronal and sagittal reformatted imaging from the axial acquisition.    COMPARISON:  12/09/2022    FINDINGS:  CT head: Stable large encephalomalacia left cerebral hemisphere prior dominantly left MCA territory involving the left frontal parietal temporal and basal ganglia with additional components in the left posterior parietal/occipital lobe.  In addition there is continued large left and smaller right areas of cerebellar encephalomalacia    No evidence for acute intracranial hemorrhage.  There is hypoattenuation along the left cortical spinal track with small caliber left cerebral peduncle compatible with wallerian degeneration unchanged    Compensatory enlargement ventricles sulci and cisterns without hydrocephalus similar to prior.  No significant new abnormal parenchymal attenuation.  Large opacification left sphenoid sinus with thickening sinus walls concerning for chronic sinus disease.  There is small lobular opacity right maxillary antra suggestive for mucous retention cyst    Partially visualized prominent left maxillary molar tooth periapical cyst    CT cervical spine: Cervical sagittal alignment is grossly within normal limits.  There is degenerative disc disease with intervertebral height loss and endplate degeneration at all levels.   Allowing for degenerative change there is no evidence for acute fracture of the cervical spine.  No consolidation visualized lung apices    Degenerative change of the atlantal dental joint.  There is slight leftward rotatory subluxation of C1 on C2 with anterior positioning of the right C1 lateral mass approximately 3-4 mm anterior with respect to the C2 lateral mass and posterior subluxation of the left C1 lateral mass approximately 2-3 mm.    Allowing for artifact from CT technique degenerative change most pronounced at C5/C6 level with posterior disc osteophyte, uncovertebral joint hypertrophy and facet arthropathy with probable mild central canal stenosis with moderate bilateral bony neural foraminal stenosis.                                       X-Ray Pelvis Routine AP (Final result)  Result time 08/30/24 09:05:01      Final result by Melchor Steel MD (08/30/24 09:05:01)                   Impression:      No convincing acute displaced fracture.  Degenerative changes in both hips, right side worse than left.      Electronically signed by: Melchor Steel MD  Date:    08/30/2024  Time:    09:05               Narrative:    EXAMINATION:  XR PELVIS ROUTINE AP    CLINICAL HISTORY:  Unspecified fall, initial encounter    TECHNIQUE:  AP view of the pelvis was performed.    COMPARISON:  None.    FINDINGS:  Exam quality is limited by suboptimal positioning.  Moderate degenerative changes in both hips, right side worse than left.  There are small lucencies in the right proximal femur and right acetabulum which could be related to subchondral cysts.  Degenerative changes in the lower lumbar spine.  No convincing acute displaced fracture.  No dislocation.  Atherosclerotic vascular calcifications.  No unexpected radiopaque foreign body.                                       Medications - No data to display  Medical Decision Making  Workup is unremarkable.  He will be discharged back to the nursing home.  Instructed  home to return patient to the emergency department immediately for any new or worsening symptoms and they verbalized understanding.    Amount and/or Complexity of Data Reviewed  Radiology: ordered. Decision-making details documented in ED Course.    Risk  Risk Details: Differential diagnosis includes but is not limited to:  Intracerebral hemorrhage, fracture, dislocation, sprain, strain, contusion               ED Course as of 08/31/24 1939   Fri Aug 30, 2024   0907 X-Ray Pelvis Routine AP  No convincing acute displaced fracture.  Degenerative changes in both hips, right side worse than left.      [CD]   1039 CT Head Without Contrast  Scattered areas of encephalomalacia most pronounced left MCA territory and left cerebellum compatible with remote infarcts similar to prior.  No evidence for acute intracranial findings specifically without evidence for acute intracranial hemorrhage or new abnormal parenchymal attenuation [CD]   1039 CT Cervical Spine Without Contrast  Degenerative change of the cervical spine without evidence for acute fracture.  There is slight leftward rotatory subluxation of C1 on C2 with advanced degenerative change of the atlantal dental joint. [CD]      ED Course User Index  [CD] Mariano Spencer DO                           Clinical Impression:  Final diagnoses:  [W19.XXXA] Fall (Primary)          ED Disposition Condition    Discharge Stable          ED Prescriptions    None       Follow-up Information       Follow up With Specialties Details Why Contact Info Additional Information    [facility] Family Medicine Schedule an appointment as soon as possible for a visit   [address], [address]  [place] [address] [phone] Please park in [address] or [address] and use [address]. Take Medical Office Bldg. elevators.             Mariano Spencer DO  08/31/24 1940

## 2024-12-24 ENCOUNTER — HOSPITAL ENCOUNTER (INPATIENT)
Facility: HOSPITAL | Age: 74
LOS: 5 days | DRG: 696 | End: 2024-12-30
Attending: EMERGENCY MEDICINE | Admitting: HOSPITALIST
Payer: MEDICARE

## 2024-12-24 DIAGNOSIS — R31.9 HEMATURIA, UNSPECIFIED TYPE: ICD-10-CM

## 2024-12-24 DIAGNOSIS — N17.9 AKI (ACUTE KIDNEY INJURY): Primary | ICD-10-CM

## 2024-12-24 DIAGNOSIS — R33.9 URINARY RETENTION: ICD-10-CM

## 2024-12-24 DIAGNOSIS — R31.0 GROSS HEMATURIA: ICD-10-CM

## 2024-12-24 DIAGNOSIS — Z86.19 HISTORY OF HEPATITIS C: ICD-10-CM

## 2024-12-24 DIAGNOSIS — I10 ESSENTIAL HYPERTENSION: ICD-10-CM

## 2024-12-24 DIAGNOSIS — R07.9 CHEST PAIN: ICD-10-CM

## 2024-12-24 PROBLEM — M54.16 LUMBAR RADICULOPATHY, CHRONIC: Status: ACTIVE | Noted: 2021-04-25

## 2024-12-24 PROBLEM — M54.9 CHRONIC BACK PAIN: Status: ACTIVE | Noted: 2021-04-25

## 2024-12-24 PROBLEM — G89.29 CHRONIC BACK PAIN: Status: ACTIVE | Noted: 2021-04-25

## 2024-12-24 PROBLEM — D64.9 NORMOCYTIC ANEMIA: Status: ACTIVE | Noted: 2022-04-14

## 2024-12-24 PROBLEM — I69.30 HISTORY OF CVA WITH RESIDUAL DEFICIT: Status: ACTIVE | Noted: 2024-12-24

## 2024-12-24 PROBLEM — N40.0 BPH (BENIGN PROSTATIC HYPERPLASIA): Status: ACTIVE | Noted: 2024-12-24

## 2024-12-24 LAB
ALBUMIN SERPL BCP-MCNC: 3.5 G/DL (ref 3.5–5.2)
ALP SERPL-CCNC: 90 U/L (ref 40–150)
ALT SERPL W/O P-5'-P-CCNC: 46 U/L (ref 10–44)
ANION GAP SERPL CALC-SCNC: 7 MMOL/L (ref 8–16)
ANISOCYTOSIS BLD QL SMEAR: SLIGHT
AST SERPL-CCNC: 34 U/L (ref 10–40)
BACTERIA #/AREA URNS AUTO: ABNORMAL /HPF
BASOPHILS # BLD AUTO: 0.01 K/UL (ref 0–0.2)
BASOPHILS NFR BLD: 0.1 % (ref 0–1.9)
BILIRUB SERPL-MCNC: 0.3 MG/DL (ref 0.1–1)
BILIRUB UR QL STRIP: NEGATIVE
BUN SERPL-MCNC: 38 MG/DL (ref 8–23)
BUN SERPL-MCNC: 45 MG/DL (ref 6–30)
CALCIUM SERPL-MCNC: 8.9 MG/DL (ref 8.7–10.5)
CHLORIDE SERPL-SCNC: 109 MMOL/L (ref 95–110)
CHLORIDE SERPL-SCNC: 110 MMOL/L (ref 95–110)
CLARITY UR REFRACT.AUTO: CLEAR
CO2 SERPL-SCNC: 22 MMOL/L (ref 23–29)
COLOR UR AUTO: ABNORMAL
CREAT SERPL-MCNC: 1.7 MG/DL (ref 0.5–1.4)
CREAT SERPL-MCNC: 1.7 MG/DL (ref 0.5–1.4)
CREAT UR-MCNC: 73 MG/DL (ref 23–375)
DIFFERENTIAL METHOD BLD: ABNORMAL
EOSINOPHIL # BLD AUTO: 0.2 K/UL (ref 0–0.5)
EOSINOPHIL NFR BLD: 1.4 % (ref 0–8)
ERYTHROCYTE [DISTWIDTH] IN BLOOD BY AUTOMATED COUNT: 14.6 % (ref 11.5–14.5)
EST. GFR  (NO RACE VARIABLE): 42 ML/MIN/1.73 M^2
GLUCOSE SERPL-MCNC: 82 MG/DL (ref 70–110)
GLUCOSE SERPL-MCNC: 84 MG/DL (ref 70–110)
GLUCOSE UR QL STRIP: NEGATIVE
HCT VFR BLD AUTO: 36.9 % (ref 40–54)
HCT VFR BLD CALC: 38 %PCV (ref 36–54)
HCV AB SERPL QL IA: REACTIVE
HGB BLD-MCNC: 12.1 G/DL (ref 14–18)
HGB UR QL STRIP: ABNORMAL
HIV 1+2 AB+HIV1 P24 AG SERPL QL IA: NORMAL
HYALINE CASTS UR QL AUTO: 0 /LPF
IMM GRANULOCYTES # BLD AUTO: 0.06 K/UL (ref 0–0.04)
IMM GRANULOCYTES NFR BLD AUTO: 0.5 % (ref 0–0.5)
KETONES UR QL STRIP: NEGATIVE
LEUKOCYTE ESTERASE UR QL STRIP: ABNORMAL
LYMPHOCYTES # BLD AUTO: 1.4 K/UL (ref 1–4.8)
LYMPHOCYTES NFR BLD: 10.9 % (ref 18–48)
MCH RBC QN AUTO: 31.8 PG (ref 27–31)
MCHC RBC AUTO-ENTMCNC: 32.8 G/DL (ref 32–36)
MCV RBC AUTO: 97 FL (ref 82–98)
MICROSCOPIC COMMENT: ABNORMAL
MONOCYTES # BLD AUTO: 1.4 K/UL (ref 0.3–1)
MONOCYTES NFR BLD: 11.1 % (ref 4–15)
NEUTROPHILS # BLD AUTO: 9.5 K/UL (ref 1.8–7.7)
NEUTROPHILS NFR BLD: 76 % (ref 38–73)
NITRITE UR QL STRIP: NEGATIVE
NRBC BLD-RTO: 0 /100 WBC
PH UR STRIP: 7 [PH] (ref 5–8)
PLATELET # BLD AUTO: 142 K/UL (ref 150–450)
PLATELET BLD QL SMEAR: ABNORMAL
PMV BLD AUTO: 11.8 FL (ref 9.2–12.9)
POC IONIZED CALCIUM: 1.28 MMOL/L (ref 1.06–1.42)
POC TCO2 (MEASURED): 26 MMOL/L (ref 23–29)
POTASSIUM BLD-SCNC: 4.4 MMOL/L (ref 3.5–5.1)
POTASSIUM SERPL-SCNC: 4.2 MMOL/L (ref 3.5–5.1)
PROT SERPL-MCNC: 8 G/DL (ref 6–8.4)
PROT UR QL STRIP: ABNORMAL
RBC # BLD AUTO: 3.8 M/UL (ref 4.6–6.2)
RBC #/AREA URNS AUTO: >100 /HPF (ref 0–4)
SAMPLE: ABNORMAL
SODIUM BLD-SCNC: 142 MMOL/L (ref 136–145)
SODIUM SERPL-SCNC: 139 MMOL/L (ref 136–145)
SODIUM UR-SCNC: 120 MMOL/L (ref 20–250)
SP GR UR STRIP: 1.01 (ref 1–1.03)
URN SPEC COLLECT METH UR: ABNORMAL
WBC # BLD AUTO: 12.46 K/UL (ref 3.9–12.7)
WBC #/AREA URNS AUTO: 20 /HPF (ref 0–5)

## 2024-12-24 PROCEDURE — G0378 HOSPITAL OBSERVATION PER HR: HCPCS

## 2024-12-24 PROCEDURE — 80047 BASIC METABLC PNL IONIZED CA: CPT

## 2024-12-24 PROCEDURE — 25000003 PHARM REV CODE 250

## 2024-12-24 PROCEDURE — 63600175 PHARM REV CODE 636 W HCPCS

## 2024-12-24 PROCEDURE — 51702 INSERT TEMP BLADDER CATH: CPT

## 2024-12-24 PROCEDURE — 80053 COMPREHEN METABOLIC PANEL: CPT

## 2024-12-24 PROCEDURE — 84300 ASSAY OF URINE SODIUM: CPT

## 2024-12-24 PROCEDURE — 96360 HYDRATION IV INFUSION INIT: CPT

## 2024-12-24 PROCEDURE — 87389 HIV-1 AG W/HIV-1&-2 AB AG IA: CPT | Performed by: PHYSICIAN ASSISTANT

## 2024-12-24 PROCEDURE — 85025 COMPLETE CBC W/AUTO DIFF WBC: CPT

## 2024-12-24 PROCEDURE — 86803 HEPATITIS C AB TEST: CPT | Performed by: PHYSICIAN ASSISTANT

## 2024-12-24 PROCEDURE — 87522 HEPATITIS C REVRS TRNSCRPJ: CPT | Performed by: PHYSICIAN ASSISTANT

## 2024-12-24 PROCEDURE — 81001 URINALYSIS AUTO W/SCOPE: CPT

## 2024-12-24 PROCEDURE — 87086 URINE CULTURE/COLONY COUNT: CPT

## 2024-12-24 PROCEDURE — 99285 EMERGENCY DEPT VISIT HI MDM: CPT | Mod: 25

## 2024-12-24 PROCEDURE — 82570 ASSAY OF URINE CREATININE: CPT

## 2024-12-24 RX ORDER — SODIUM CHLORIDE, SODIUM LACTATE, POTASSIUM CHLORIDE, CALCIUM CHLORIDE 600; 310; 30; 20 MG/100ML; MG/100ML; MG/100ML; MG/100ML
INJECTION, SOLUTION INTRAVENOUS CONTINUOUS
Status: ACTIVE | OUTPATIENT
Start: 2024-12-24 | End: 2024-12-25

## 2024-12-24 RX ORDER — SODIUM CHLORIDE 0.9 % (FLUSH) 0.9 %
10 SYRINGE (ML) INJECTION EVERY 12 HOURS PRN
Status: DISCONTINUED | OUTPATIENT
Start: 2024-12-24 | End: 2024-12-30 | Stop reason: HOSPADM

## 2024-12-24 RX ORDER — FLUOXETINE HYDROCHLORIDE 20 MG/1
20 CAPSULE ORAL DAILY
Status: DISCONTINUED | OUTPATIENT
Start: 2024-12-25 | End: 2024-12-27

## 2024-12-24 RX ORDER — SENNOSIDES 8.6 MG/1
1 TABLET ORAL NIGHTLY
Status: DISCONTINUED | OUTPATIENT
Start: 2024-12-24 | End: 2024-12-30 | Stop reason: HOSPADM

## 2024-12-24 RX ORDER — CARVEDILOL 25 MG/1
25 TABLET ORAL 2 TIMES DAILY WITH MEALS
Status: DISCONTINUED | OUTPATIENT
Start: 2024-12-24 | End: 2024-12-30 | Stop reason: HOSPADM

## 2024-12-24 RX ORDER — NALOXONE HCL 0.4 MG/ML
0.02 VIAL (ML) INJECTION
Status: DISCONTINUED | OUTPATIENT
Start: 2024-12-24 | End: 2024-12-30 | Stop reason: HOSPADM

## 2024-12-24 RX ORDER — LEVETIRACETAM 500 MG/1
500 TABLET ORAL 2 TIMES DAILY
Status: DISCONTINUED | OUTPATIENT
Start: 2024-12-24 | End: 2024-12-30 | Stop reason: HOSPADM

## 2024-12-24 RX ORDER — POLYETHYLENE GLYCOL 3350 17 G/17G
17 POWDER, FOR SOLUTION ORAL 2 TIMES DAILY PRN
Status: DISCONTINUED | OUTPATIENT
Start: 2024-12-24 | End: 2024-12-30 | Stop reason: HOSPADM

## 2024-12-24 RX ORDER — FAMOTIDINE 20 MG/1
20 TABLET, FILM COATED ORAL DAILY
Status: DISCONTINUED | OUTPATIENT
Start: 2024-12-25 | End: 2024-12-30 | Stop reason: HOSPADM

## 2024-12-24 RX ORDER — GLUCAGON 1 MG
1 KIT INJECTION
Status: DISCONTINUED | OUTPATIENT
Start: 2024-12-24 | End: 2024-12-30 | Stop reason: HOSPADM

## 2024-12-24 RX ORDER — FINASTERIDE 5 MG/1
5 TABLET, FILM COATED ORAL DAILY
Status: DISCONTINUED | OUTPATIENT
Start: 2024-12-25 | End: 2024-12-30 | Stop reason: HOSPADM

## 2024-12-24 RX ORDER — CITALOPRAM 20 MG/1
20 TABLET, FILM COATED ORAL DAILY
Status: DISCONTINUED | OUTPATIENT
Start: 2024-12-25 | End: 2024-12-30 | Stop reason: HOSPADM

## 2024-12-24 RX ORDER — ACETAMINOPHEN 500 MG
1000 TABLET ORAL EVERY 8 HOURS PRN
Status: DISCONTINUED | OUTPATIENT
Start: 2024-12-24 | End: 2024-12-30 | Stop reason: HOSPADM

## 2024-12-24 RX ORDER — ATORVASTATIN CALCIUM 40 MG/1
40 TABLET, FILM COATED ORAL NIGHTLY
Status: DISCONTINUED | OUTPATIENT
Start: 2024-12-24 | End: 2024-12-30 | Stop reason: HOSPADM

## 2024-12-24 RX ORDER — TAMSULOSIN HYDROCHLORIDE 0.4 MG/1
0.4 CAPSULE ORAL DAILY
Status: DISCONTINUED | OUTPATIENT
Start: 2024-12-25 | End: 2024-12-30 | Stop reason: HOSPADM

## 2024-12-24 RX ORDER — IBUPROFEN 200 MG
16 TABLET ORAL
Status: DISCONTINUED | OUTPATIENT
Start: 2024-12-24 | End: 2024-12-30 | Stop reason: HOSPADM

## 2024-12-24 RX ORDER — CYCLOBENZAPRINE HCL 5 MG
5 TABLET ORAL 3 TIMES DAILY PRN
Status: DISCONTINUED | OUTPATIENT
Start: 2024-12-24 | End: 2024-12-30 | Stop reason: HOSPADM

## 2024-12-24 RX ORDER — IBUPROFEN 200 MG
24 TABLET ORAL
Status: DISCONTINUED | OUTPATIENT
Start: 2024-12-24 | End: 2024-12-30 | Stop reason: HOSPADM

## 2024-12-24 RX ADMIN — CARVEDILOL 25 MG: 12.5 TABLET, FILM COATED ORAL at 08:12

## 2024-12-24 RX ADMIN — LEVETIRACETAM 500 MG: 500 TABLET, FILM COATED ORAL at 08:12

## 2024-12-24 RX ADMIN — SENNOSIDES 1 TABLET: 8.6 TABLET, FILM COATED ORAL at 08:12

## 2024-12-24 RX ADMIN — ATORVASTATIN CALCIUM 40 MG: 40 TABLET, FILM COATED ORAL at 08:12

## 2024-12-24 RX ADMIN — SODIUM CHLORIDE 1000 ML: 9 INJECTION, SOLUTION INTRAVENOUS at 11:12

## 2024-12-24 RX ADMIN — SODIUM CHLORIDE, POTASSIUM CHLORIDE, SODIUM LACTATE AND CALCIUM CHLORIDE: 600; 310; 30; 20 INJECTION, SOLUTION INTRAVENOUS at 08:12

## 2024-12-24 NOTE — ED PROVIDER NOTES
"Encounter Date: 12/24/2024       History     Chief Complaint   Patient presents with    Hematuria     EMS from [facility] with complaints of hematuria started this morning.      74-year-old male with a history of CVA, currently on Plavix, presents with residual right-sided deficits, aphasia, BPH, bowel/bladder incontinence, and hypercholesterolemia. He comes to the emergency department with complaints of new-onset hematuria, dysuria, and left flank discomfort, which appear to have begun today. The patient is unable to provide a detailed history due to his aphasia but can respond with yes/no answers. I spoke with the [facility] where the patient resides, and they reported seeing blood-tinged urine today. Prior to this, however, they noted that his urine had been "dark" in color. The facility has encouraged him to drink more fluids. The patient has no known fevers, no episodes of diarrhea, and denies abdominal pain.      Review of patient's allergies indicates:  No Known Allergies  Past Medical History:   Diagnosis Date    BPH (benign prostatic hyperplasia)     Hypercholesterolemia     Stroke      History reviewed. No pertinent surgical history.  No family history on file.  Social History     Tobacco Use    Smoking status: Never    Smokeless tobacco: Never   Substance Use Topics    Drug use: Never     Review of Systems  See HPI, ROS is limited due to patient/baseline mental status  Physical Exam     Initial Vitals [12/24/24 0953]   BP Pulse Resp Temp SpO2   (!) 144/88 76 18 98 °F (36.7 °C) 98 %      MAP       --         Physical Exam    Vitals reviewed.  Constitutional: He appears well-developed.   Patient is chronically ill however nontoxic appearing   HENT:   Head: Normocephalic and atraumatic.   Eyes: Conjunctivae and EOM are normal.   Neck:   Normal range of motion.  Cardiovascular:  Normal rate.           Pulmonary/Chest: No respiratory distress.   Abdominal: Abdomen is soft. He exhibits no " distension. There is no abdominal tenderness.   No abdominal tenderness on exam, no rebound.  No CVA tenderness. There is no rebound.   Musculoskeletal:         General: No edema.      Cervical back: Normal range of motion.      Comments: Right upper extremity contracted, with weakness secondary to CVA     Neurological: He is alert and oriented to person, place, and time.   Skin: Skin is warm and dry.   No lesions or vesicles noted on abdomen or flank.   Psychiatric: He has a normal mood and affect. Thought content normal.         ED Course   Procedures  Labs Reviewed   HEPATITIS C ANTIBODY - Abnormal       Result Value    Hepatitis C Ab Reactive (*)     Narrative:     Release to patient->Immediate   CBC W/ AUTO DIFFERENTIAL - Abnormal    WBC 12.46      RBC 3.80 (*)     Hemoglobin 12.1 (*)     Hematocrit 36.9 (*)     MCV 97      MCH 31.8 (*)     MCHC 32.8      RDW 14.6 (*)     Platelets 142 (*)     MPV 11.8      Immature Granulocytes 0.5      Gran # (ANC) 9.5 (*)     Immature Grans (Abs) 0.06 (*)     Lymph # 1.4      Mono # 1.4 (*)     Eos # 0.2      Baso # 0.01      nRBC 0      Gran % 76.0 (*)     Lymph % 10.9 (*)     Mono % 11.1      Eosinophil % 1.4      Basophil % 0.1      Platelet Estimate Decreased (*)     Aniso Slight      Differential Method Automated     COMPREHENSIVE METABOLIC PANEL - Abnormal    Sodium 139      Potassium 4.2      Chloride 110      CO2 22 (*)     Glucose 84      BUN 38 (*)     Creatinine 1.7 (*)     Calcium 8.9      Total Protein 8.0      Albumin 3.5      Total Bilirubin 0.3      Alkaline Phosphatase 90      AST 34      ALT 46 (*)     eGFR 42.0 (*)     Anion Gap 7 (*)    URINALYSIS, REFLEX TO URINE CULTURE - Abnormal    Specimen UA Urine, Clean Catch      Color, UA Brown (*)     Appearance, UA Clear      pH, UA 7.0      Specific Gravity, UA 1.015      Protein, UA 2+ (*)     Glucose, UA Negative      Ketones, UA Negative      Bilirubin (UA) Negative      Occult Blood UA 3+ (*)     Nitrite,  UA Negative      Leukocytes, UA 3+ (*)     Narrative:     Specimen Source->Urine   URINALYSIS MICROSCOPIC - Abnormal    RBC, UA >100 (*)     WBC, UA 20 (*)     Bacteria Rare      Hyaline Casts, UA 0      Microscopic Comment SEE COMMENT      Narrative:     Specimen Source->Urine   ISTAT PROCEDURE - Abnormal    POC Glucose 82      POC BUN 45 (*)     POC Creatinine 1.7 (*)     POC Sodium 142      POC Potassium 4.4      POC Chloride 109      POC TCO2 (MEASURED) 26      POC Ionized Calcium 1.28      POC Hematocrit 38      Sample DESTINEY     CULTURE, URINE   HIV 1 / 2 ANTIBODY    HIV 1/2 Ag/Ab Non-reactive      Narrative:     Release to patient->Immediate   HEPATITIS C RNA, QUANTITATIVE, PCR   ISTAT CHEM8          Imaging Results              CT Abdomen Pelvis  Without Contrast (Final result)  Result time 12/24/24 14:04:22      Final result by Sidney Gaviria MD (12/24/24 14:04:22)                   Impression:      1. Dependent hyperattenuating material within the urinary bladder eccentric to the right would be in keeping with blood products in light of reported history hematuria.  Underlying soft tissue mass not excluded.  Recommend follow-up with urology.  2. Minimal asymmetric prominent caliber of the right ureter, possibly related to obstruction at the level of the ureterovesical junction due to the above-described blood products.  Sequela of a recently passed stone difficult to entirely exclude.  3. Additional details of chronic and incidental findings, as provided in the body of the report.      Electronically signed by: Sidney Gaviria  Date:    12/24/2024  Time:    14:04               Narrative:    EXAMINATION:  CT ABDOMEN PELVIS WITHOUT CONTRAST    CLINICAL HISTORY:  left flank pain;    TECHNIQUE:  Low dose axial images, sagittal and coronal reformations were obtained from the lung bases to the pubic symphysis.    COMPARISON:  None    FINDINGS:  This examination is limited due to lack of intravenous  contrast.    Lower chest: Atelectasis and/or scar at the visualized right greater left lung bases.  Right lower lobe bronchial wall thickening.  Question trace right pleural effusion.  Superimposed infectious and/or noninfectious inflammatory airspace consolidation at the right lower lobe difficult to exclude.    Liver: Normal contour.    Gallbladder and bile ducts: Unremarkable.    Pancreas: Normal contour.    Spleen: Normal contour.    Adrenals: Normal contour.    Kidneys: Nonspecific bilateral perinephric stranding.  No definite obstructing radiopaque urolithiasis.  Minimal asymmetrically prominent caliber of the right ureter.    Lymph nodes: No abdominal or pelvic lymphadenopathy.    Bowel and mesentery: Small hiatal hernia.  No evidence of bowel obstruction.  Moderate volume colonic stool.  Colonic diverticulosis.  Normal appendix.    Abdominal aorta: Nonaneurysmal.  Moderate atherosclerotic calcification.    Inferior vena cava: Unremarkable.    Free fluid or free air: None.    Pelvis: Unremarkable.    Urinary bladder: Hyperattenuating material dependently within the right aspect of the urinary bladder would be in keeping with blood products in light of reported history of hematuria.  Underlying soft tissue mass not excluded by current technique.    Body wall: Unremarkable.    Bones: No definite acute abnormality.  Degenerative findings of the spine and hips.  Suggested moderate to severe central spinal canal stenosis at L4-5 and L5-S1.  Foraminal stenosis is severe bilaterally at L5-S1 and on the right at L4-5.                                       Medications   sodium chloride 0.9% bolus 1,000 mL 1,000 mL (0 mLs Intravenous Stopped 12/24/24 1237)     Medical Decision Making  Patient  is a 74 y.o.  male  presenting to the emergency department with complaints of  hematuria, dysuria left flank pain x 1day.  History is difficult to obtain due to patient's chronic aphasia.  Known history of bladder/bowel  incontinence wears an adult diaper.    Differential diagnosis includes but  not limited to pyelonephritis, nephrolithiasis, clot burden secondary to retention, JOSE CARLOS.    CT reveals:  Dependent hyperattenuating material within the urinary bladder eccentric to the right would be in keeping with blood products in light of reported history hematuria.  Underlying soft tissue mass not excluded. 2. Minimal asymmetric prominent caliber of the right ureter, possibly related to obstruction at the level of the ureterovesical junction due to the above-described blood products.  Sequela of a recently passed stone difficult to entirely exclude.    On arrival he is nontoxic-appearing afebrile no sirs criteria. Consulted Urology due to concern of obstruction with JOSE CARLOS, patient's bladder irrigated and Child placed by Urology.  Patient's urine continues  to presents as a dark red color.  They are not concerned for infection at this time.  However recommend continued Child placement, trending creatinine and Urology  will assess in the morning.  They discussed possible holding of Plavix due to hematuria, however we will defer to medicine. Significant findings of JOSE CARLOS creatinine 1.7, baseline 1.3.   Trend Creatine, keep child. Hold off antibiotics.         Amount and/or Complexity of Data Reviewed  Labs: ordered. Decision-making details documented in ED Course.  Radiology: ordered.               ED Course as of 12/24/24 1821   Tue Dec 24, 2024   1528  mL [CR]   1631 Bright red blood seen from catheter. [CR]   1632 Creatinine(!): 1.7  Patient currently being evaluated by Urology. [CR]      ED Course User Index  [CR] Xochitl Gaitan PA-C                           Clinical Impression:  Final diagnoses:  [N17.9] JOSE CARLOS (acute kidney injury) (Primary)  [R31.9] Hematuria, unspecified type  [R33.9] Urinary retention          ED Disposition Condition    Observation Stable                Xochitl Gaitan PA-C  12/24/24 1802        Xochitl Gaitan PA-C  12/24/24 1822

## 2024-12-24 NOTE — HPI
74-year-old male with a history of CVA, currently on Plavix, presents with residual right-sided deficits, aphasia, BPH, bowel/bladder incontinence, and hypercholesterolemia. He comes to the emergency department with complaints of new-onset hematuria, dysuria, and left flank discomfort, which appear to have begun today. Patient with an JOSE CARLOS of 1.7 (BL 1.3). CT scan performed in the ED showed possible blood products in the bladder and right ureteral fullness. Urology consulted for hematuria, JOSE CARLOS and right ureteral fullness.     On assesment the patient is AFVSS. Patient in only able to answer yes and no questions. Denies fevers, chills, flank pain. No CVA tenderness on exam. Male purewick in place with dark red urine.     WBC 12.46. Hg 12.1. Cr 1.7 from baseline of 1.3. UA nitrite negative, >100 RBC, 20 WBC and rare bacteria. .    CT showed right ureteral fullness, hyperdense products in the right side of the bladder consistent with blood products vs mass.

## 2024-12-24 NOTE — ED NOTES
I-STAT Chem-8+ Results:   Value Reference Range   Sodium 142 136-145 mmol/L   Potassium  4.4 3.5-5.1 mmol/L   Chloride 109  mmol/L   Ionized Calcium 1.28 1.06-1.42 mmol/L   CO2 (measured) 26 23-29 mmol/L   Glucose 82  mg/dL   BUN 45 6-30 mg/dL   Creatinine 1.7 0.5-1.4 mg/dL   Hematocrit 38 36-54%

## 2024-12-24 NOTE — ED NOTES
Pt comes to ED c/o hematuria with L sided flank pain x3 days. Denies fevers or dysuria. Hx stroke with R sided deficits and dysarthria.

## 2024-12-25 LAB
ALBUMIN SERPL BCP-MCNC: 3.4 G/DL (ref 3.5–5.2)
ALP SERPL-CCNC: 92 U/L (ref 40–150)
ALT SERPL W/O P-5'-P-CCNC: 37 U/L (ref 10–44)
ANION GAP SERPL CALC-SCNC: 7 MMOL/L (ref 8–16)
ANISOCYTOSIS BLD QL SMEAR: SLIGHT
APTT PPP: 31.6 SEC (ref 21–32)
AST SERPL-CCNC: 31 U/L (ref 10–40)
BACTERIA UR CULT: NORMAL
BASOPHILS # BLD AUTO: 0.02 K/UL (ref 0–0.2)
BASOPHILS NFR BLD: 0.2 % (ref 0–1.9)
BILIRUB SERPL-MCNC: 0.4 MG/DL (ref 0.1–1)
BUN SERPL-MCNC: 24 MG/DL (ref 8–23)
CALCIUM SERPL-MCNC: 8.6 MG/DL (ref 8.7–10.5)
CHLORIDE SERPL-SCNC: 111 MMOL/L (ref 95–110)
CO2 SERPL-SCNC: 21 MMOL/L (ref 23–29)
CREAT SERPL-MCNC: 1.4 MG/DL (ref 0.5–1.4)
DIFFERENTIAL METHOD BLD: ABNORMAL
EOSINOPHIL # BLD AUTO: 0.3 K/UL (ref 0–0.5)
EOSINOPHIL NFR BLD: 2.5 % (ref 0–8)
ERYTHROCYTE [DISTWIDTH] IN BLOOD BY AUTOMATED COUNT: 14.4 % (ref 11.5–14.5)
EST. GFR  (NO RACE VARIABLE): 53.1 ML/MIN/1.73 M^2
GLUCOSE SERPL-MCNC: 79 MG/DL (ref 70–110)
HCT VFR BLD AUTO: 41.3 % (ref 40–54)
HGB BLD-MCNC: 13.2 G/DL (ref 14–18)
IMM GRANULOCYTES # BLD AUTO: 0.04 K/UL (ref 0–0.04)
IMM GRANULOCYTES NFR BLD AUTO: 0.4 % (ref 0–0.5)
INR PPP: 1 (ref 0.8–1.2)
LYMPHOCYTES # BLD AUTO: 2.1 K/UL (ref 1–4.8)
LYMPHOCYTES NFR BLD: 19.1 % (ref 18–48)
MAGNESIUM SERPL-MCNC: 1.9 MG/DL (ref 1.6–2.6)
MCH RBC QN AUTO: 31.4 PG (ref 27–31)
MCHC RBC AUTO-ENTMCNC: 32 G/DL (ref 32–36)
MCV RBC AUTO: 98 FL (ref 82–98)
MONOCYTES # BLD AUTO: 1.2 K/UL (ref 0.3–1)
MONOCYTES NFR BLD: 11.4 % (ref 4–15)
NEUTROPHILS # BLD AUTO: 7.1 K/UL (ref 1.8–7.7)
NEUTROPHILS NFR BLD: 66.4 % (ref 38–73)
NRBC BLD-RTO: 0 /100 WBC
PHOSPHATE SERPL-MCNC: 2.3 MG/DL (ref 2.7–4.5)
PLATELET # BLD AUTO: 133 K/UL (ref 150–450)
PLATELET BLD QL SMEAR: ABNORMAL
PMV BLD AUTO: 11.4 FL (ref 9.2–12.9)
POIKILOCYTOSIS BLD QL SMEAR: SLIGHT
POLYCHROMASIA BLD QL SMEAR: ABNORMAL
POTASSIUM SERPL-SCNC: 3.8 MMOL/L (ref 3.5–5.1)
PROT SERPL-MCNC: 8.1 G/DL (ref 6–8.4)
PROTHROMBIN TIME: 11.4 SEC (ref 9–12.5)
RBC # BLD AUTO: 4.2 M/UL (ref 4.6–6.2)
SODIUM SERPL-SCNC: 139 MMOL/L (ref 136–145)
SPHEROCYTES BLD QL SMEAR: ABNORMAL
WBC # BLD AUTO: 10.71 K/UL (ref 3.9–12.7)

## 2024-12-25 PROCEDURE — 11000001 HC ACUTE MED/SURG PRIVATE ROOM

## 2024-12-25 PROCEDURE — 84100 ASSAY OF PHOSPHORUS: CPT

## 2024-12-25 PROCEDURE — 63600175 PHARM REV CODE 636 W HCPCS: Performed by: HOSPITALIST

## 2024-12-25 PROCEDURE — 83735 ASSAY OF MAGNESIUM: CPT

## 2024-12-25 PROCEDURE — 80053 COMPREHEN METABOLIC PANEL: CPT

## 2024-12-25 PROCEDURE — 85610 PROTHROMBIN TIME: CPT

## 2024-12-25 PROCEDURE — 85730 THROMBOPLASTIN TIME PARTIAL: CPT

## 2024-12-25 PROCEDURE — 25000003 PHARM REV CODE 250

## 2024-12-25 PROCEDURE — 85025 COMPLETE CBC W/AUTO DIFF WBC: CPT

## 2024-12-25 PROCEDURE — 36415 COLL VENOUS BLD VENIPUNCTURE: CPT

## 2024-12-25 PROCEDURE — 87522 HEPATITIS C REVRS TRNSCRPJ: CPT

## 2024-12-25 RX ORDER — SODIUM,POTASSIUM PHOSPHATES 280-250MG
2 POWDER IN PACKET (EA) ORAL ONCE
Status: COMPLETED | OUTPATIENT
Start: 2024-12-25 | End: 2024-12-25

## 2024-12-25 RX ORDER — CEFTRIAXONE 1 G/1
1 INJECTION, POWDER, FOR SOLUTION INTRAMUSCULAR; INTRAVENOUS
Status: DISCONTINUED | OUTPATIENT
Start: 2024-12-25 | End: 2024-12-25

## 2024-12-25 RX ORDER — CLOPIDOGREL BISULFATE 75 MG/1
75 TABLET ORAL DAILY
Status: DISCONTINUED | OUTPATIENT
Start: 2024-12-25 | End: 2024-12-26

## 2024-12-25 RX ORDER — CEFTRIAXONE 2 G/1
2 INJECTION, POWDER, FOR SOLUTION INTRAMUSCULAR; INTRAVENOUS
Status: DISCONTINUED | OUTPATIENT
Start: 2024-12-25 | End: 2024-12-30 | Stop reason: HOSPADM

## 2024-12-25 RX ADMIN — CARVEDILOL 25 MG: 12.5 TABLET, FILM COATED ORAL at 08:12

## 2024-12-25 RX ADMIN — ATORVASTATIN CALCIUM 40 MG: 40 TABLET, FILM COATED ORAL at 08:12

## 2024-12-25 RX ADMIN — CITALOPRAM HYDROBROMIDE 20 MG: 20 TABLET ORAL at 08:12

## 2024-12-25 RX ADMIN — LEVETIRACETAM 500 MG: 500 TABLET, FILM COATED ORAL at 08:12

## 2024-12-25 RX ADMIN — POTASSIUM & SODIUM PHOSPHATES POWDER PACK 280-160-250 MG 2 PACKET: 280-160-250 PACK at 10:12

## 2024-12-25 RX ADMIN — SENNOSIDES 1 TABLET: 8.6 TABLET, FILM COATED ORAL at 08:12

## 2024-12-25 RX ADMIN — FINASTERIDE 5 MG: 5 TABLET, FILM COATED ORAL at 08:12

## 2024-12-25 RX ADMIN — CARVEDILOL 25 MG: 12.5 TABLET, FILM COATED ORAL at 06:12

## 2024-12-25 RX ADMIN — FAMOTIDINE 20 MG: 20 TABLET ORAL at 08:12

## 2024-12-25 RX ADMIN — FLUOXETINE HYDROCHLORIDE 20 MG: 20 CAPSULE ORAL at 08:12

## 2024-12-25 RX ADMIN — TAMSULOSIN HYDROCHLORIDE 0.4 MG: 0.4 CAPSULE ORAL at 08:12

## 2024-12-25 RX ADMIN — CLOPIDOGREL BISULFATE 75 MG: 75 TABLET ORAL at 09:12

## 2024-12-25 RX ADMIN — CEFTRIAXONE 2 G: 2 INJECTION, POWDER, FOR SOLUTION INTRAMUSCULAR; INTRAVENOUS at 10:12

## 2024-12-25 NOTE — SUBJECTIVE & OBJECTIVE
Past Medical History:   Diagnosis Date    BPH (benign prostatic hyperplasia)     Hypercholesterolemia     Stroke        History reviewed. No pertinent surgical history.    Review of patient's allergies indicates:  No Known Allergies    No current facility-administered medications on file prior to encounter.     Current Outpatient Medications on File Prior to Encounter   Medication Sig    amLODIPine (NORVASC) 5 MG tablet Take 5 mg by mouth once daily.    atorvastatin (LIPITOR) 40 MG tablet Take 40 mg by mouth every evening.    carvediloL (COREG) 25 MG tablet Take 25 mg by mouth 2 (two) times daily with meals.    citalopram (CELEXA) 20 MG tablet Take 20 mg by mouth once daily.    clopidogreL (PLAVIX) 75 mg tablet Take 75 mg by mouth once daily.    cyclobenzaprine (FLEXERIL) 5 MG tablet Take 5 mg by mouth 3 (three) times daily as needed for Muscle spasms.    famotidine (PEPCID) 20 MG tablet Take 20 mg by mouth 2 (two) times daily.    finasteride (PROSCAR) 5 mg tablet Take 5 mg by mouth once daily.    FLUoxetine 20 MG capsule Take 20 mg by mouth once daily.    levETIRAcetam (KEPPRA) 500 MG Tab Take 1 tablet (500 mg total) by mouth 2 (two) times daily.    losartan (COZAAR) 50 MG tablet Take 50 mg by mouth once daily.    meloxicam (MOBIC) 15 MG tablet Take 15 mg by mouth once daily.    multivitamin (ONE DAILY MULTIVITAMIN) per tablet Take 1 tablet by mouth once daily.    senna (SENOKOT) 8.6 mg tablet Take 1 tablet by mouth every evening.    tamsulosin (FLOMAX) 0.4 mg Cap Take by mouth once daily.     Family History    None       Tobacco Use    Smoking status: Never    Smokeless tobacco: Never   Substance and Sexual Activity    Alcohol use: Not on file    Drug use: Never    Sexual activity: Not Currently     Review of Systems   Constitutional:  Negative for activity change, chills, fatigue and fever.   HENT:  Negative for rhinorrhea and sore throat.    Eyes:  Negative for redness and visual disturbance.   Respiratory:   Negative for cough, shortness of breath and wheezing.    Cardiovascular:  Negative for chest pain, palpitations and leg swelling.   Gastrointestinal:  Negative for abdominal distention, abdominal pain, blood in stool, constipation, diarrhea, nausea and vomiting.   Genitourinary:  Positive for difficulty urinating, dysuria and flank pain.   Musculoskeletal:  Negative for back pain.   Skin:  Negative for rash and wound.   Neurological:  Negative for dizziness and light-headedness.   Psychiatric/Behavioral:  Negative for agitation. The patient is not nervous/anxious.      Objective:     Vital Signs (Most Recent):  Temp: 97.5 °F (36.4 °C) (12/24/24 1900)  Pulse: 79 (12/24/24 1900)  Resp: 16 (12/24/24 1900)  BP: (!) 157/77 (12/24/24 1900)  SpO2: 100 % (12/24/24 1900) Vital Signs (24h Range):  Temp:  [97.3 °F (36.3 °C)-98.3 °F (36.8 °C)] 97.5 °F (36.4 °C)  Pulse:  [74-80] 79  Resp:  [14-20] 16  SpO2:  [95 %-100 %] 100 %  BP: (128-174)/() 157/77     Weight: 69.4 kg (153 lb)  Body mass index is 19.64 kg/m².     Physical Exam  Constitutional:       General: He is not in acute distress.     Appearance: Normal appearance.      Comments: Frail appearing   HENT:      Head: Normocephalic and atraumatic.      Right Ear: External ear normal.      Left Ear: External ear normal.      Nose: No congestion or rhinorrhea.      Mouth/Throat:      Mouth: Mucous membranes are moist.      Pharynx: Oropharynx is clear.   Eyes:      Extraocular Movements: Extraocular movements intact.      Conjunctiva/sclera: Conjunctivae normal.   Cardiovascular:      Rate and Rhythm: Normal rate and regular rhythm.      Pulses: Normal pulses.      Heart sounds: Normal heart sounds. No murmur heard.  Pulmonary:      Effort: Pulmonary effort is normal.      Breath sounds: Normal breath sounds. No wheezing or rales.   Abdominal:      General: Abdomen is flat. There is no distension.      Tenderness: There is no abdominal tenderness. There is no right CVA  tenderness, left CVA tenderness or guarding.      Comments: Tense abdominal wall; non-tender to palpation   Musculoskeletal:         General: No swelling.      Right lower leg: No edema.      Left lower leg: No edema.   Skin:     General: Skin is warm and dry.      Findings: No rash.   Neurological:      Mental Status: He is alert and oriented to person, place, and time. Mental status is at baseline.      Motor: No weakness.      Comments: Right upper and lower extremity contractures   Psychiatric:         Mood and Affect: Mood normal.         Behavior: Behavior normal.          Significant Labs: All pertinent labs within the past 24 hours have been reviewed.    Significant Imaging: I have reviewed all pertinent imaging results/findings within the past 24 hours.

## 2024-12-25 NOTE — PROGRESS NOTES
[facility] Telemetry  Urology  Progress Note    Patient Name: Wu Hampton  MRN: [id number]  Admission Date: 12/24/2024  Hospital Length of Stay: 0 days  Code Status: Full Code   Attending Provider: Melina Walker MD   Primary Care Physician: Danielle, Primary Doctor    Subjective:     HPI:  74-year-old male with a history of CVA, currently on Plavix, presents with residual right-sided deficits, aphasia, BPH, bowel/bladder incontinence, and hypercholesterolemia. He comes to the emergency department with complaints of new-onset hematuria, dysuria, and left flank discomfort, which appear to have begun today. Patient with an JOSE CARLOS of 1.7 (BL 1.3). CT scan performed in the ED showed possible blood products in the bladder and right ureteral fullness. Urology consulted for hematuria, JOSE CARLOS and right ureteral fullness.     On assesment the patient is AFVSS. Patient in only able to answer yes and no questions. Denies fevers, chills, flank pain. No CVA tenderness on exam. Male purewick in place with dark red urine.     WBC 12.46. Hg 12.1. Cr 1.7 from baseline of 1.3. UA nitrite negative, >100 RBC, 20 WBC and rare bacteria. .    CT showed right ureteral fullness, hyperdense products in the right side of the bladder consistent with blood products vs mass.     Interval History:   Lora draining clear yellow urine this am, good Uop.  Hgb stable, Cr 1.4 from 1.7.      Objective:     Temp:  [97.3 °F (36.3 °C)-98.6 °F (37 °C)] 97.9 °F (36.6 °C)  Pulse:  [68-80] 68  Resp:  [14-20] 18  SpO2:  [95 %-100 %] 96 %  BP: (128-174)/() 145/84     Body mass index is 21 kg/m².      Post Void Cath Residual Output (mL): (S) 299 mL (12/24/24 1527)    Drains       Drain  Duration                  Urethral Catheter 12/24/24 1654 <1 day                     Physical Exam  Constitutional:       General: He is not in acute distress.     Appearance: Normal appearance.   HENT:      Head: Normocephalic and atraumatic.   Eyes:       "Extraocular Movements: Extraocular movements intact.      Pupils: Pupils are equal, round, and reactive to light.   Cardiovascular:      Rate and Rhythm: Normal rate.   Pulmonary:      Effort: Pulmonary effort is normal. No respiratory distress.   Abdominal:      General: Abdomen is flat. There is no distension.      Tenderness: There is no abdominal tenderness. There is no right CVA tenderness or left CVA tenderness.   Genitourinary:     Comments: Lora draining clear yellow urine    Musculoskeletal:      Comments: MSK contracture on the right side.    Skin:     Coloration: Skin is not jaundiced.   Neurological:      General: No focal deficit present.      Mental Status: He is alert and oriented to person, place, and time.   Psychiatric:         Mood and Affect: Mood normal.         Behavior: Behavior normal.           Significant Labs:    BMP:  Recent Labs   Lab 12/24/24  1135 12/25/24  0618    139   K 4.2 3.8    111*   CO2 22* 21*   BUN 38* 24*   CREATININE 1.7* 1.4   CALCIUM 8.9 8.6*       CBC:   Recent Labs   Lab 12/24/24  1135 12/24/24  1147 12/25/24  0618   WBC 12.46  --  10.71   HGB 12.1*  --  13.2*   HCT 36.9* 38 41.3   *  --  133*       Blood Culture: No results for input(s): "LABBLOO" in the last 168 hours.  Urine Culture: No results for input(s): "LABURIN" in the last 168 hours.  Urine Studies:   Recent Labs   Lab 12/24/24  1248   COLORU Brown*   APPEARANCEUA Clear   PHUR 7.0   SPECGRAV 1.015   PROTEINUA 2+*   GLUCUA Negative   KETONESU Negative   BILIRUBINUA Negative   OCCULTUA 3+*   NITRITE Negative   LEUKOCYTESUR 3+*   RBCUA >100*   WBCUA 20*   BACTERIA Rare   HYALINECASTS 0       Significant Imaging:  All pertinent imaging results/findings from the past 24 hours have been reviewed.      Assessment/Plan:     * Gross hematuria  74-year-old male with a history of CVA, currently on Plavix, presents with residual right-sided deficits, aphasia, BPH, bowel/bladder incontinence, and " hypercholesterolemia. He comes to the emergency department with complaints of new-onset hematuria, dysuria, and left flank discomfort, which appear to have begun today. Patient with an JOSE CARLOS of 1.7 (BL 1.3). CT scan performed in the ED showed possible blood products in the bladder vs mass and right ureteral fullness. Urology consulted for hematuria, JOSE CARLOS and right ureteral fullness.      -- Recommend admission to  given patients co morbidities   -- 22 Fr child placed with return of 500 cc of dark red urine. Significant clot burden irrigated  -- Maintain child upon discharge x5-7 days  -- Okay to restart plavix per primary team from urologic perspective  -- Cr 1.4 from 1.7, near baseline 1.2-1.3  -- UA non concerning for infection  -- mIVF  -- Urology will arrange outpatient follow up for gross hematuria workup.   -- CT scan 12/24/24 with possible bladder mass, will perform workup outpatient    Dispo: Stable for discharge from urologic perspective, will arrange outpatient follow up        VTE Risk Mitigation (From admission, onward)           Ordered     Reason for No Pharmacological VTE Prophylaxis  Once        Question:  Reasons:  Answer:  Active Bleeding    12/24/24 1919     IP VTE HIGH RISK PATIENT  Once         12/24/24 1919     Place sequential compression device  Until discontinued         12/24/24 1919                    Saad Mcmullen MD  Urology  [facility] - [facility] Tele[facility]

## 2024-12-25 NOTE — ASSESSMENT & PLAN NOTE
Unclear if JOSE CARLOS vs CKD on admission. Historically his Cr was 1.86 on 11/8/24 at which time he was admitted at [facility] [place] w/ upper GI bleed and had improved to 1.6 on 11/9/24 with IV fluids. Possibly at his baseline ~ 1.7, but will workup for JOSE CARLOS.    If JOSE CARLOS, then likely due to post-obstructive d/t hematuria w/ clots . Baseline creatinine is unknown. Most recent creatinine and eGFR are listed below.  Recent Labs     12/24/24  1135   CREATININE 1.7*   EGFRNORACEVR 42.0*      Plan  - CT abd/pelvis with right-sided hydronephrosis suspected 2/2 clots since irrigated w/ urology   - Consider follow-up renal US if Cr worsens  - JOSE CARLOS is stable  - Avoid nephrotoxins and renally dose meds for GFR listed above  - Monitor urine output, serial BMP, and adjust therapy as needed  - LR @ 75 cc/hr x 10 hours

## 2024-12-25 NOTE — ASSESSMENT & PLAN NOTE
74 yoM w/ acute onset dysuria, hematuria, and flank pain. CT abd/pelvis with possible blood products in bladder vs mass w/ associated right ureteral fullness. Urology consulted on admission who placed 22 Fr child w/ return of 500cc dark red urine and significant clot burden was irrigated. Of note the patient is on plavix for h/o stroke (2022) and history of cardiac stenting (2020).    -- Urology consulted; appreciate recs  -- Child management per urology  -- Hold Plavix; resume as indicated  -- Will need urology follow-up as outpatient for gross hematuria workup

## 2024-12-25 NOTE — ASSESSMENT & PLAN NOTE
Anemia is likely due to acute blood loss which was from hematuria and Iron deficiency. Most recent hemoglobin and hematocrit are listed below.  Recent Labs     12/24/24  1135 12/24/24  1147 12/25/24  0618   HGB 12.1*  --  13.2*   HCT 36.9* 38 41.3       Plan  - Monitor serial CBC: Daily  - Transfuse PRBC if patient becomes hemodynamically unstable, symptomatic or H/H drops below 7/21.  - Patient has not received any PRBC transfusions to date  - Patient's anemia is currently stable

## 2024-12-25 NOTE — CONSULTS
[facility] - Emergency Dept  Urology  Consult Note    Patient Name: Wu Hampton  MRN: [id number]  Admission Date: 12/24/2024  Hospital Length of Stay: 0   Code Status: No Order   Attending Provider: Mikel Marrero MD   Consulting Provider: Eduardo Holt DO  Primary Care Physician: No, Primary Doctor  Principal Problem:<principal problem not specified>    Inpatient consult to Urology  Consult performed by: Eduardo Holt DO  Consult ordered by: Xochitl Gaitan PA-C  Reason for consult: Hematuria, Retention, JOSE CARLOS          Subjective:     HPI:  74-year-old male with a history of CVA, currently on Plavix, presents with residual right-sided deficits, aphasia, BPH, bowel/bladder incontinence, and hypercholesterolemia. He comes to the emergency department with complaints of new-onset hematuria, dysuria, and left flank discomfort, which appear to have begun today. Patient with an JOSE CARLOS of 1.7 (BL 1.3). CT scan performed in the ED showed possible blood products in the bladder and right ureteral fullness. Urology consulted for hematuria, JOSE CARLOS and right ureteral fullness.     On assesment the patient is AFVSS. Patient in only able to answer yes and no questions. Denies fevers, chills, flank pain. No CVA tenderness on exam. Male purewick in place with dark red urine.     WBC 12.46. Hg 12.1. Cr 1.7 from baseline of 1.3. UA nitrite negative, >100 RBC, 20 WBC and rare bacteria. .    CT showed right ureteral fullness, hyperdense products in the right side of the bladder consistent with blood products vs mass.     Past Medical History:   Diagnosis Date    BPH (benign prostatic hyperplasia)     Hypercholesterolemia     Stroke        History reviewed. No pertinent surgical history.    Review of patient's allergies indicates:  No Known Allergies    Family History    None         Tobacco Use    Smoking status: Never    Smokeless tobacco: Never   Substance and Sexual Activity    Alcohol use: Not on file    Drug use: Never     Sexual activity: Not Currently       Review of Systems   Constitutional:  Negative for chills and fever.   Genitourinary:  Positive for difficulty urinating and hematuria.       Objective:     Temp:  [97.3 °F (36.3 °C)-98.3 °F (36.8 °C)] 97.3 °F (36.3 °C)  Pulse:  [75-80] 80  Resp:  [14-20] 16  SpO2:  [95 %-100 %] 99 %  BP: (128-174)/() 171/96  Weight: 69.4 kg (153 lb)  Body mass index is 19.64 kg/m².    Date 12/24/24 0700 - 12/25/24 0659   Shift 8121-9582 6721-4608 4589-7588 24 Hour Total   INTAKE   Shift Total(mL/kg)       OUTPUT   Urine(mL/kg/hr)  599  599   Shift Total(mL/kg)  599(8.6)  599(8.6)   Weight (kg) 69.4 69.4 69.4 69.4     Post Void Cath Residual Output (mL): (S) 299 mL (12/24/24 1527)    Drains       Drain  Duration                  Urethral Catheter 12/24/24 1654 <1 day                     Physical Exam  Constitutional:       General: He is not in acute distress.     Appearance: Normal appearance.   HENT:      Head: Normocephalic and atraumatic.   Eyes:      Extraocular Movements: Extraocular movements intact.      Pupils: Pupils are equal, round, and reactive to light.   Cardiovascular:      Rate and Rhythm: Normal rate.   Pulmonary:      Effort: Pulmonary effort is normal. No respiratory distress.   Abdominal:      General: Abdomen is flat. There is no distension.      Tenderness: There is no abdominal tenderness. There is no right CVA tenderness or left CVA tenderness.   Genitourinary:     Comments: Dark red urine in canister    Musculoskeletal:      Comments: MSK contracture on the right side.    Skin:     Coloration: Skin is not jaundiced.   Neurological:      General: No focal deficit present.      Mental Status: He is alert and oriented to person, place, and time.   Psychiatric:         Mood and Affect: Mood normal.         Behavior: Behavior normal.          Significant Labs:    BMP:  Recent Labs   Lab 12/24/24  1135      K 4.2      CO2 22*   BUN 38*   CREATININE 1.7*  "  CALCIUM 8.9       CBC:  Recent Labs   Lab 12/24/24  1135 12/24/24  1147   WBC 12.46  --    HGB 12.1*  --    HCT 36.9* 38   *  --        Blood Culture: No results for input(s): "LABBLOO" in the last 168 hours.  CMP:   Recent Labs   Lab 12/24/24  1135   GLU 84      K 4.2      CO2 22*   BUN 38*   CREATININE 1.7*   CALCIUM 8.9     Urine Culture: No results for input(s): "LABURIN" in the last 168 hours.  Urine Studies:   Recent Labs   Lab 12/24/24  1248   COLORU Brown*   APPEARANCEUA Clear   PHUR 7.0   SPECGRAV 1.015   PROTEINUA 2+*   GLUCUA Negative   KETONESU Negative   BILIRUBINUA Negative   OCCULTUA 3+*   NITRITE Negative   LEUKOCYTESUR 3+*   RBCUA >100*   WBCUA 20*   BACTERIA Rare   HYALINECASTS 0       Significant Imaging:  CT: I have reviewed all results within the past 24 hours and my personal findings are:  as noted in the HPI                     Assessment and Plan:     Gross hematuria  74-year-old male with a history of CVA, currently on Plavix, presents with residual right-sided deficits, aphasia, BPH, bowel/bladder incontinence, and hypercholesterolemia. He comes to the emergency department with complaints of new-onset hematuria, dysuria, and left flank discomfort, which appear to have begun today. Patient with an JOSE CARLOS of 1.7 (BL 1.3). CT scan performed in the ED showed possible blood products in the bladder vs mass and right ureteral fullness. Urology consulted for hematuria, JOSE CARLOS and right ureteral fullness.      -- Recommend admission to  given patients co morbidities   -- 22 Fr child placed with return of 500 cc of dark red urine. Significant clot burden irrigated  -- Maintain child  -- Hold Plavix if feasible.   -- Trend Cr  -- Maintain child  -- UA non concerning for infection  -- mIVF  -- Urology will arrange outpatient follow up for gross hematuria workup.         VTE Risk Mitigation (From admission, onward)      None            Thank you for your consult. I will follow-up with " patient. Please contact us if you have any additional questions.    Eduardo Holt,   Urology  [facility] - Emergency Dept

## 2024-12-25 NOTE — ASSESSMENT & PLAN NOTE
Anemia is likely due to acute blood loss which was from hematuria and Iron deficiency. Most recent hemoglobin and hematocrit are listed below.  Recent Labs     12/24/24  1135 12/24/24  1147   HGB 12.1*  --    HCT 36.9* 38     Plan  - Monitor serial CBC: Daily  - Transfuse PRBC if patient becomes hemodynamically unstable, symptomatic or H/H drops below 7/21.  - Patient has not received any PRBC transfusions to date  - Patient's anemia is currently stable

## 2024-12-25 NOTE — ASSESSMENT & PLAN NOTE
74-year-old male with a history of CVA, currently on Plavix, presents with residual right-sided deficits, aphasia, BPH, bowel/bladder incontinence, and hypercholesterolemia. He comes to the emergency department with complaints of new-onset hematuria, dysuria, and left flank discomfort, which appear to have begun today. Patient with an JOSE CARLOS of 1.7 (BL 1.3). CT scan performed in the ED showed possible blood products in the bladder vs mass and right ureteral fullness. Urology consulted for hematuria, JOSE CARLOS and right ureteral fullness.      -- Recommend admission to  given patients co morbidities   -- 22 Fr child placed with return of 500 cc of dark red urine. Significant clot burden irrigated  -- Maintain child  -- Hold Plavix if feasible.   -- Trend Cr  -- Maintain child  -- UA non concerning for infection  -- mIVF  -- Urology will arrange outpatient follow up for gross hematuria workup.

## 2024-12-25 NOTE — SUBJECTIVE & OBJECTIVE
"Interval History:   Lora draining clear yellow urine this am, good Uop.  Hgb stable, Cr 1.4 from 1.7.      Objective:     Temp:  [97.3 °F (36.3 °C)-98.6 °F (37 °C)] 97.9 °F (36.6 °C)  Pulse:  [68-80] 68  Resp:  [14-20] 18  SpO2:  [95 %-100 %] 96 %  BP: (128-174)/() 145/84     Body mass index is 21 kg/m².      Post Void Cath Residual Output (mL): (S) 299 mL (12/24/24 1527)    Drains       Drain  Duration                  Urethral Catheter 12/24/24 1654 <1 day                     Physical Exam  Constitutional:       General: He is not in acute distress.     Appearance: Normal appearance.   HENT:      Head: Normocephalic and atraumatic.   Eyes:      Extraocular Movements: Extraocular movements intact.      Pupils: Pupils are equal, round, and reactive to light.   Cardiovascular:      Rate and Rhythm: Normal rate.   Pulmonary:      Effort: Pulmonary effort is normal. No respiratory distress.   Abdominal:      General: Abdomen is flat. There is no distension.      Tenderness: There is no abdominal tenderness. There is no right CVA tenderness or left CVA tenderness.   Genitourinary:     Comments: Lora draining clear yellow urine    Musculoskeletal:      Comments: MSK contracture on the right side.    Skin:     Coloration: Skin is not jaundiced.   Neurological:      General: No focal deficit present.      Mental Status: He is alert and oriented to person, place, and time.   Psychiatric:         Mood and Affect: Mood normal.         Behavior: Behavior normal.           Significant Labs:    BMP:  Recent Labs   Lab 12/24/24  1135 12/25/24  0618    139   K 4.2 3.8    111*   CO2 22* 21*   BUN 38* 24*   CREATININE 1.7* 1.4   CALCIUM 8.9 8.6*       CBC:   Recent Labs   Lab 12/24/24  1135 12/24/24  1147 12/25/24  0618   WBC 12.46  --  10.71   HGB 12.1*  --  13.2*   HCT 36.9* 38 41.3   *  --  133*       Blood Culture: No results for input(s): "LABBLOO" in the last 168 hours.  Urine Culture: No results for " "input(s): "LABURIN" in the last 168 hours.  Urine Studies:   Recent Labs   Lab 12/24/24  1248   COLORU Brown*   APPEARANCEUA Clear   PHUR 7.0   SPECGRAV 1.015   PROTEINUA 2+*   GLUCUA Negative   KETONESU Negative   BILIRUBINUA Negative   OCCULTUA 3+*   NITRITE Negative   LEUKOCYTESUR 3+*   RBCUA >100*   WBCUA 20*   BACTERIA Rare   HYALINECASTS 0       Significant Imaging:  All pertinent imaging results/findings from the past 24 hours have been reviewed.    "

## 2024-12-25 NOTE — ASSESSMENT & PLAN NOTE
74-year-old male with a history of CVA, currently on Plavix, presents with residual right-sided deficits, aphasia, BPH, bowel/bladder incontinence, and hypercholesterolemia. He comes to the emergency department with complaints of new-onset hematuria, dysuria, and left flank discomfort, which appear to have begun today. Patient with an JOSE CARLOS of 1.7 (BL 1.3). CT scan performed in the ED showed possible blood products in the bladder vs mass and right ureteral fullness. Urology consulted for hematuria, JOSE CARLOS and right ureteral fullness.      -- Recommend admission to  given patients co morbidities   -- 22 Fr child placed with return of 500 cc of dark red urine. Significant clot burden irrigated  -- Maintain child upon discharge x5-7 days  -- Okay to restart plavix per primary team from urologic perspective  -- Cr 1.4 from 1.7, near baseline 1.2-1.3  -- UA non concerning for infection  -- mIVF  -- Urology will arrange outpatient follow up for gross hematuria workup.     Dispo: Stable for discharge from urologic perspective, will arrange outpatient follow up

## 2024-12-25 NOTE — PLAN OF CARE
Problem: Skin Injury Risk Increased  Goal: Skin Health and Integrity  Outcome: Progressing     Problem: Adult Inpatient Plan of Care  Goal: Plan of Care Review  12/25/2024 0149 by Renard Camacho, RN  Outcome: Progressing     Problem: Adult Inpatient Plan of Care  Goal: Patient-Specific Goal (Individualized)  12/25/2024 0150 by Renard Camacho, RN  Outcome: Progressing     Problem: Electrolyte Imbalance  Goal: Electrolyte Balance  Outcome: Progressing

## 2024-12-25 NOTE — PROGRESS NOTES
[facility] - Internal Medicine [facility] Medicine  Progress Note    Patient Name: Wu Hampton  MRN: [id number]  Patient Class: IP- Inpatient   Admission Date: 12/24/2024  Length of Stay: 0 days  Attending Physician: Melina Walker MD  Primary Care Provider: Danielle, Primary Doctor        Subjective     Principal Problem:Gross hematuria        HPI:  Mr. Wu Hampton Jr. is a 74 y.o. 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 Who presents to [facility] ED today with new-onset hematuria, dysuria, and left flank discomfort, which appear to have begun the day of admission. History is somewhat limited by patient's aphasia. He denies fevers, chills, chest pain, nausea/vomiting.    In the ED, he is hemodynamically stable, labs remarkable for WBC 12.46. Hg 12.1. Cr 1.7 (Baseline ~ 1.6-1.8 in November via Care Everywhere). UA nitrite negative  >100 RBC, 20 WBC and rare bacteria. Post-void residual w/ 300. CT scan performed in the ED shows possible blood products in the bladder and right ureteral fullness. Urology consulted who placed a child and irrigated the bladder with return of clot.    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 Presents with new-onset hematuria, dysuria, and left flank discomfort. CT shows right ureteral fullness, hyperdense products in the right side of the bladder consistent with blood products vs mass. Urology consult appreciated- Maintain child upon discharge x5-7 days, Urology will arrange outpatient follow up for gross hematuria workup.    Interval History: No active events overnight. Had good urine output, still slightly bloody.     Review of Systems  Objective:     Vital Signs (Most Recent):  Temp: 98.5 °F (36.9 °C)  (12/25/24 1101)  Pulse: 68 (12/25/24 1101)  Resp: 18 (12/25/24 1101)  BP: (!) 148/75 (12/25/24 1101)  SpO2: 99 % (12/25/24 1101) Vital Signs (24h Range):  Temp:  [97.3 °F (36.3 °C)-98.6 °F (37 °C)] 98.5 °F (36.9 °C)  Pulse:  [68-80] 68  Resp:  [14-20] 18  SpO2:  [95 %-100 %] 99 %  BP: (136-172)/() 148/75     Weight: 74.2 kg (163 lb 9.3 oz)  Body mass index is 21 kg/m².    Intake/Output Summary (Last 24 hours) at 12/25/2024 1425  Last data filed at 12/25/2024 0558  Gross per 24 hour   Intake 200 ml   Output 1689 ml   Net -1489 ml         Physical Exam  Constitutional:       General: He is not in acute distress.     Appearance: Normal appearance.      Comments: Frail appearing   HENT:      Head: Normocephalic and atraumatic.      Right Ear: External ear normal.      Left Ear: External ear normal.      Nose: No congestion or rhinorrhea.      Mouth/Throat:      Mouth: Mucous membranes are moist.   Cardiovascular:      Rate and Rhythm: Normal rate and regular rhythm.      Pulses: Normal pulses.      Heart sounds: Normal heart sounds. No murmur heard.  Pulmonary:      Effort: Pulmonary effort is normal.      Breath sounds: Normal breath sounds. No wheezing or rales.   Abdominal:      General: Abdomen is flat. There is no distension.      Tenderness: There is no abdominal tenderness.      Comments: Tense abdominal wall; non-tender to palpation   Musculoskeletal:         General: No swelling.      Right lower leg: No edema.      Left lower leg: No edema.   Skin:     General: Skin is warm and dry.      Findings: No rash.   Neurological:      Mental Status: He is alert and oriented to person, place, and time. Mental status is at baseline.      Motor: No weakness.      Comments: Right upper and lower extremity contractures   Psychiatric:         Mood and Affect: Mood normal.         Behavior: Behavior normal.             Significant Labs: All pertinent labs within the past 24 hours have been reviewed.    Significant  Imaging: I have reviewed all pertinent imaging results/findings within the past 24 hours.    Assessment and Plan     * Gross hematuria  74 yoM w/ acute onset dysuria, hematuria, and flank pain. CT abd/pelvis with possible blood products in bladder vs mass w/ associated right ureteral fullness. Urology consulted on admission who placed 22 Fr child w/ return of 500cc dark red urine and significant clot burden was irrigated. Of note the patient is on plavix for h/o stroke (2022) and history of cardiac stenting (2020).    -- Urology consulted; appreciate recs  -- Child management per urology  -- Hold Plavix; resume as indicated  -- Will need urology follow-up as outpatient for gross hematuria workup    History of hepatitis C  Noted history. Hep C Ab positive on admission. Unclear if previously treated.    -- Follow-up quant Hep C PCR  -- Consider outpatient follow-up pending PCR results      Normocytic anemia  Anemia is likely due to acute blood loss which was from hematuria and Iron deficiency. Most recent hemoglobin and hematocrit are listed below.  Recent Labs     12/24/24  1135 12/24/24  1147 12/25/24  0618   HGB 12.1*  --  13.2*   HCT 36.9* 38 41.3       Plan  - Monitor serial CBC: Daily  - Transfuse PRBC if patient becomes hemodynamically unstable, symptomatic or H/H drops below 7/21.  - Patient has not received any PRBC transfusions to date  - Patient's anemia is currently stable    Essential hypertension  Patient's blood pressure range in the last 24 hours was: BP  Min: 128/60  Max: 174/81.The patient's inpatient anti-hypertensive regimen is listed below:  Current Antihypertensives  carvediloL tablet 25 mg, 2 times daily with meals, Oral    Plan  - BP is controlled, no changes needed to their regimen  - Titrate home regimen as indicated    BPH (benign prostatic hyperplasia)  Chronic condition.    -- Continue home Tamsulosin 0.4 mg daily  -- Continue home finasteride 5 mg daily    History of CVA with residual  deficit  H/o CVA w/ right-sided weakness and contractures with dysarthria.    -- Holding plavix due to hematuria  -- Continue atorvastatin 40 mg daily  -- Q2h turns    JOSE CARLOS (acute kidney injury)  Unclear if JOSE CARLOS vs CKD on admission. Historically his Cr was 1.86 on 11/8/24 at which time [place] [facility] [place] and had improved to 1.6 on 11/9/24 with IV fluids. Possibly at his baseline ~ 1.7, but will workup for JOSE CARLOS.    If JOSE CARLOS, then likely due to post-obstructive d/t hematuria w/ clots . Baseline creatinine is unknown. Most recent creatinine and eGFR are listed below.  Recent Labs     12/24/24  1135 12/25/24  0618   CREATININE 1.7* 1.4   EGFRNORACEVR 42.0* 53.1*        Plan  - CT abd/pelvis with right-sided hydronephrosis suspected 2/2 clots since irrigated w/ urology   - Consider follow-up renal US if Cr worsens  - JOSE CARLOS is stable  - Avoid nephrotoxins and renally dose meds for GFR listed above  - Monitor urine output, serial BMP, and adjust therapy as needed  - LR @ 75 cc/hr x 10 hours      VTE Risk Mitigation (From admission, onward)           Ordered     Reason for No Pharmacological VTE Prophylaxis  Once        Question:  Reasons:  Answer:  Active Bleeding    12/24/24 1919     IP VTE HIGH RISK PATIENT  Once         12/24/24 1919     Place sequential compression device  Until discontinued         12/24/24 1919                    Discharge Planning   PALAK: 12/26/2024     Code Status: Full Code   Medical Readiness for Discharge Date:                            Hoang Wylie MD  Department of Hospital Medicine   [facility] - Internal Medicine Telemetry

## 2024-12-25 NOTE — HOSPITAL COURSE
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 Presents with new-onset hematuria, dysuria, and left flank discomfort. CT shows right ureteral fullness, hyperdense products in the right side of the bladder consistent with blood products vs mass. Urology consult and irrigated the bladder with removal of clots. Recommended to maintain child upon discharge x5-7 days, Urology will arrange outpatient follow up for gross hematuria workup. He was challenged with re-initiation of his plavix prior to discharging but he had recurrent hematuria so his plavix was held. Pt did not experience another episode of hematuria after Plavix was held again. Pt discharged to [facility] with child in place; child will be managed/removed with Urology outpatient.

## 2024-12-25 NOTE — HPI
Mr. Wu Hampton Jr. is a 74 y.o. 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 Who [place] [facility] [place] with new-onset hematuria, dysuria, and left flank discomfort, which appear to have begun the day of admission. History is somewhat limited by patient's aphasia. He denies fevers, chills, chest pain, nausea/vomiting.    In the ED, he is hemodynamically stable, labs remarkable for WBC 12.46. Hg 12.1. Cr 1.7 (Baseline ~ 1.6-1.8 in November via Care Everywhere). UA nitrite negative  >100 RBC, 20 WBC and rare bacteria. Post-void residual w/ 300. CT scan performed in the ED shows possible blood products in the bladder and right ureteral fullness. Urology consulted who placed a child and irrigated the bladder with return of clot.

## 2024-12-25 NOTE — H&P
[facility] - Emergency Dept  [facility] Medicine  History & Physical    Patient Name: Wu Hampton  MRN: [id number]  Patient Class: OP- Observation  Admission Date: 12/24/2024  Attending Physician: Melina Walker MD   Primary Care Provider: Danielle Primary Doctor         Patient information was obtained from patient, past medical records, and ER records.     Subjective:     Principal Problem:Gross hematuria    Chief Complaint:   Chief Complaint   Patient presents with    Hematuria     EMS from [facility] with complaints of hematuria started this morning.         HPI: Mr. Wu Hampton Jr. is a 74 y.o. 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 Who presents to [facility] ED today with new-onset hematuria, dysuria, and left flank discomfort, which appear to have begun the day of admission. History is somewhat limited by patient's aphasia. He denies fevers, chills, chest pain, nausea/vomiting.    In the ED, he is hemodynamically stable, labs remarkable for WBC 12.46. Hg 12.1. Cr 1.7 (Baseline ~ 1.6-1.8 in November via Care Everywhere). UA nitrite negative  >100 RBC, 20 WBC and rare bacteria. Post-void residual w/ 300. CT scan performed in the ED shows possible blood products in the bladder and right ureteral fullness. Urology consulted who placed a child and irrigated the bladder with return of clot.    Past Medical History:   Diagnosis Date    BPH (benign prostatic hyperplasia)     Hypercholesterolemia     Stroke        History reviewed. No pertinent surgical history.    Review of patient's allergies indicates:  No Known Allergies    No current facility-administered medications on file prior to encounter.     Current Outpatient Medications on File Prior to Encounter   Medication Sig    amLODIPine (NORVASC) 5 MG tablet Take 5 mg by mouth once daily.    atorvastatin (LIPITOR) 40 MG tablet Take 40  mg by mouth every evening.    carvediloL (COREG) 25 MG tablet Take 25 mg by mouth 2 (two) times daily with meals.    citalopram (CELEXA) 20 MG tablet Take 20 mg by mouth once daily.    clopidogreL (PLAVIX) 75 mg tablet Take 75 mg by mouth once daily.    cyclobenzaprine (FLEXERIL) 5 MG tablet Take 5 mg by mouth 3 (three) times daily as needed for Muscle spasms.    famotidine (PEPCID) 20 MG tablet Take 20 mg by mouth 2 (two) times daily.    finasteride (PROSCAR) 5 mg tablet Take 5 mg by mouth once daily.    FLUoxetine 20 MG capsule Take 20 mg by mouth once daily.    levETIRAcetam (KEPPRA) 500 MG Tab Take 1 tablet (500 mg total) by mouth 2 (two) times daily.    losartan (COZAAR) 50 MG tablet Take 50 mg by mouth once daily.    meloxicam (MOBIC) 15 MG tablet Take 15 mg by mouth once daily.    multivitamin (ONE DAILY MULTIVITAMIN) per tablet Take 1 tablet by mouth once daily.    senna (SENOKOT) 8.6 mg tablet Take 1 tablet by mouth every evening.    tamsulosin (FLOMAX) 0.4 mg Cap Take by mouth once daily.     Family History    None       Tobacco Use    Smoking status: Never    Smokeless tobacco: Never   Substance and Sexual Activity    Alcohol use: Not on file    Drug use: Never    Sexual activity: Not Currently     Review of Systems   Constitutional:  Negative for activity change, chills, fatigue and fever.   HENT:  Negative for rhinorrhea and sore throat.    Eyes:  Negative for redness and visual disturbance.   Respiratory:  Negative for cough, shortness of breath and wheezing.    Cardiovascular:  Negative for chest pain, palpitations and leg swelling.   Gastrointestinal:  Negative for abdominal distention, abdominal pain, blood in stool, constipation, diarrhea, nausea and vomiting.   Genitourinary:  Positive for difficulty urinating, dysuria and flank pain.   Musculoskeletal:  Negative for back pain.   Skin:  Negative for rash and wound.   Neurological:  Negative for dizziness and light-headedness.    Psychiatric/Behavioral:  Negative for agitation. The patient is not nervous/anxious.      Objective:     Vital Signs (Most Recent):  Temp: 97.5 °F (36.4 °C) (12/24/24 1900)  Pulse: 79 (12/24/24 1900)  Resp: 16 (12/24/24 1900)  BP: (!) 157/77 (12/24/24 1900)  SpO2: 100 % (12/24/24 1900) Vital Signs (24h Range):  Temp:  [97.3 °F (36.3 °C)-98.3 °F (36.8 °C)] 97.5 °F (36.4 °C)  Pulse:  [74-80] 79  Resp:  [14-20] 16  SpO2:  [95 %-100 %] 100 %  BP: (128-174)/() 157/77     Weight: 69.4 kg (153 lb)  Body mass index is 19.64 kg/m².     Physical Exam  Constitutional:       General: He is not in acute distress.     Appearance: Normal appearance.      Comments: Frail appearing   HENT:      Head: Normocephalic and atraumatic.      Right Ear: External ear normal.      Left Ear: External ear normal.      Nose: No congestion or rhinorrhea.      Mouth/Throat:      Mouth: Mucous membranes are moist.      Pharynx: Oropharynx is clear.   Eyes:      Extraocular Movements: Extraocular movements intact.      Conjunctiva/sclera: Conjunctivae normal.   Cardiovascular:      Rate and Rhythm: Normal rate and regular rhythm.      Pulses: Normal pulses.      Heart sounds: Normal heart sounds. No murmur heard.  Pulmonary:      Effort: Pulmonary effort is normal.      Breath sounds: Normal breath sounds. No wheezing or rales.   Abdominal:      General: Abdomen is flat. There is no distension.      Tenderness: There is no abdominal tenderness. There is no right CVA tenderness, left CVA tenderness or guarding.      Comments: Tense abdominal wall; non-tender to palpation   Musculoskeletal:         General: No swelling.      Right lower leg: No edema.      Left lower leg: No edema.   Skin:     General: Skin is warm and dry.      Findings: No rash.   Neurological:      Mental Status: He is alert and oriented to person, place, and time. Mental status is at baseline.      Motor: No weakness.      Comments: Right upper and lower extremity  contractures   Psychiatric:         Mood and Affect: Mood normal.         Behavior: Behavior normal.          Significant Labs: All pertinent labs within the past 24 hours have been reviewed.    Significant Imaging: I have reviewed all pertinent imaging results/findings within the past 24 hours.  Assessment/Plan:     * Gross hematuria  74 yoM w/ acute onset dysuria, hematuria, and flank pain. CT abd/pelvis with possible blood products in bladder vs mass w/ associated right ureteral fullness. Urology consulted on admission who placed 22 Fr child w/ return of 500cc dark red urine and significant clot burden was irrigated. Of note the patient is on eliquis for h/o stroke (2022) and history of cardiac stenting (2020).    -- Urology consulted; appreciate recs  -- Child management per urology  -- Hold Plavix; resume as indicated  -- Will need urology follow-up as outpatient for gross hematuria workup    JOSE CARLOS (acute kidney injury)  Unclear if JOSE CARLOS vs CKD on admission. Historically his Cr was 1.86 on 11/8/24 at which time he was [place] at [facility] [place]/ [place] GI bleed and had improved to 1.6 on 11/9/24 with IV fluids. Possibly at his baseline ~ 1.7, but will workup for JOSE CARLOS.    If JOSE CARLOS, then likely due to post-obstructive d/t hematuria w/ clots . Baseline creatinine is unknown. Most recent creatinine and eGFR are listed below.  Recent Labs     12/24/24  1135   CREATININE 1.7*   EGFRNORACEVR 42.0*      Plan  - CT abd/pelvis with right-sided hydronephrosis suspected 2/2 clots since irrigated w/ urology   - Consider follow-up renal US if Cr worsens  - JOSE CARLOS is stable  - Avoid nephrotoxins and renally dose meds for GFR listed above  - Monitor urine output, serial BMP, and adjust therapy as needed  - LR @ 75 cc/hr x 10 hours    History of hepatitis C  Noted history. Hep C Ab positive on admission. Unclear if previously treated.    -- Follow-up quant Hep C PCR  -- Consider outpatient follow-up pending PCR results      Normocytic  anemia  Anemia is likely due to acute blood loss which was from hematuria and Iron deficiency. Most recent hemoglobin and hematocrit are listed below.  Recent Labs     12/24/24  1135 12/24/24  1147   HGB 12.1*  --    HCT 36.9* 38     Plan  - Monitor serial CBC: Daily  - Transfuse PRBC if patient becomes hemodynamically unstable, symptomatic or H/H drops below 7/21.  - Patient has not received any PRBC transfusions to date  - Patient's anemia is currently stable    Essential hypertension  Patient's blood pressure range in the last 24 hours was: BP  Min: 128/60  Max: 174/81.The patient's inpatient anti-hypertensive regimen is listed below:  Current Antihypertensives  carvediloL tablet 25 mg, 2 times daily with meals, Oral    Plan  - BP is controlled, no changes needed to their regimen  - Titrate home regimen as indicated    BPH (benign prostatic hyperplasia)  Chronic condition.    -- Continue home Tamsulosin 0.4 mg daily  -- Continue home finasteride 5 mg daily    History of CVA with residual deficit  H/o CVA w/ right-sided weakness and contractures with dysarthria.    -- Holding plavix due to hematuria  -- Continue atorvastatin 40 mg daily  -- Q2h turns      VTE Risk Mitigation (From admission, onward)           Ordered     Reason for No Pharmacological VTE Prophylaxis  Once        Question:  Reasons:  Answer:  Active Bleeding    12/24/24 1919     IP VTE HIGH RISK PATIENT  Once         12/24/24 1919     Place sequential compression device  Until discontinued         12/24/24 1919                          Tyrese France MD  Department of Hospital Medicine  [facility] - Emergency Dept

## 2024-12-25 NOTE — ASSESSMENT & PLAN NOTE
Unclear if JOSE CARLOS vs CKD on admission. Historically his Cr was 1.86 on 11/8/24 at which time he was admitted at [facility] w/ upper GI bleed and had improved to 1.6 on 11/9/24 with IV fluids. Possibly at his baseline ~ 1.7, but will workup for JOSE CARLOS.    If JOSE CARLOS, then likely due to post-obstructive d/t hematuria w/ clots . Baseline creatinine is unknown. Most recent creatinine and eGFR are listed below.  Recent Labs     12/24/24  1135 12/25/24  0618   CREATININE 1.7* 1.4   EGFRNORACEVR 42.0* 53.1*        Plan  - CT abd/pelvis with right-sided hydronephrosis suspected 2/2 clots since irrigated w/ urology   - Consider follow-up renal US if Cr worsens  - JOSE CARLOS is stable  - Avoid nephrotoxins and renally dose meds for GFR listed above  - Monitor urine output, serial BMP, and adjust therapy as needed  - LR @ 75 cc/hr x 10 hours

## 2024-12-25 NOTE — SUBJECTIVE & OBJECTIVE
Interval History: No active events overnight. Had good urine output, still slightly bloody.     Review of Systems  Objective:     Vital Signs (Most Recent):  Temp: 98.5 °F (36.9 °C) (12/25/24 1101)  Pulse: 68 (12/25/24 1101)  Resp: 18 (12/25/24 1101)  BP: (!) 148/75 (12/25/24 1101)  SpO2: 99 % (12/25/24 1101) Vital Signs (24h Range):  Temp:  [97.3 °F (36.3 °C)-98.6 °F (37 °C)] 98.5 °F (36.9 °C)  Pulse:  [68-80] 68  Resp:  [14-20] 18  SpO2:  [95 %-100 %] 99 %  BP: (136-172)/() 148/75     Weight: 74.2 kg (163 lb 9.3 oz)  Body mass index is 21 kg/m².    Intake/Output Summary (Last 24 hours) at 12/25/2024 1425  Last data filed at 12/25/2024 0558  Gross per 24 hour   Intake 200 ml   Output 1689 ml   Net -1489 ml         Physical Exam  Constitutional:       General: He is not in acute distress.     Appearance: Normal appearance.      Comments: Frail appearing   HENT:      Head: Normocephalic and atraumatic.      Right Ear: External ear normal.      Left Ear: External ear normal.      Nose: No congestion or rhinorrhea.      Mouth/Throat:      Mouth: Mucous membranes are moist.   Cardiovascular:      Rate and Rhythm: Normal rate and regular rhythm.      Pulses: Normal pulses.      Heart sounds: Normal heart sounds. No murmur heard.  Pulmonary:      Effort: Pulmonary effort is normal.      Breath sounds: Normal breath sounds. No wheezing or rales.   Abdominal:      General: Abdomen is flat. There is no distension.      Tenderness: There is no abdominal tenderness.      Comments: Tense abdominal wall; non-tender to palpation   Musculoskeletal:         General: No swelling.      Right lower leg: No edema.      Left lower leg: No edema.   Skin:     General: Skin is warm and dry.      Findings: No rash.   Neurological:      Mental Status: He is alert and oriented to person, place, and time. Mental status is at baseline.      Motor: No weakness.      Comments: Right upper and lower extremity contractures   Psychiatric:          Mood and Affect: Mood normal.         Behavior: Behavior normal.             Significant Labs: All pertinent labs within the past 24 hours have been reviewed.    Significant Imaging: I have reviewed all pertinent imaging results/findings within the past 24 hours.

## 2024-12-25 NOTE — ASSESSMENT & PLAN NOTE
Chronic condition.    -- Continue home Tamsulosin 0.4 mg daily  -- Continue home finasteride 5 mg daily

## 2024-12-25 NOTE — ASSESSMENT & PLAN NOTE
Patient's blood pressure range in the last 24 hours was: BP  Min: 128/60  Max: 174/81.The patient's inpatient anti-hypertensive regimen is listed below:  Current Antihypertensives  carvediloL tablet 25 mg, 2 times daily with meals, Oral    Plan  - BP is controlled, no changes needed to their regimen  - Titrate home regimen as indicated

## 2024-12-25 NOTE — ASSESSMENT & PLAN NOTE
74 yoM w/ acute onset dysuria, hematuria, and flank pain. CT abd/pelvis with possible blood products in bladder vs mass w/ associated right ureteral fullness. Urology consulted on admission who placed 22 Fr child w/ return of 500cc dark red urine and significant clot burden was irrigated. Of note the patient is on eliquis for h/o stroke (2022) and history of cardiac stenting (2020).    -- Urology consulted; appreciate recs  -- Child management per urology  -- Hold Plavix; resume as indicated  -- Will need urology follow-up as outpatient for gross hematuria workup

## 2024-12-25 NOTE — ASSESSMENT & PLAN NOTE
Noted history. Hep C Ab positive on admission. Unclear if previously treated.    -- Follow-up quant Hep C PCR  -- Consider outpatient follow-up pending PCR results

## 2024-12-25 NOTE — ASSESSMENT & PLAN NOTE
H/o CVA w/ right-sided weakness and contractures with dysarthria.    -- Holding plavix due to hematuria  -- Continue atorvastatin 40 mg daily  -- Q2h turns

## 2024-12-26 LAB
ALBUMIN SERPL BCP-MCNC: 3 G/DL (ref 3.5–5.2)
ALP SERPL-CCNC: 88 U/L (ref 40–150)
ALT SERPL W/O P-5'-P-CCNC: 33 U/L (ref 10–44)
ANION GAP SERPL CALC-SCNC: 7 MMOL/L (ref 8–16)
ANISOCYTOSIS BLD QL SMEAR: SLIGHT
AST SERPL-CCNC: 26 U/L (ref 10–40)
BASOPHILS # BLD AUTO: 0.04 K/UL (ref 0–0.2)
BASOPHILS NFR BLD: 0.4 % (ref 0–1.9)
BILIRUB SERPL-MCNC: 0.2 MG/DL (ref 0.1–1)
BUN SERPL-MCNC: 21 MG/DL (ref 8–23)
BURR CELLS BLD QL SMEAR: ABNORMAL
CALCIUM SERPL-MCNC: 8.5 MG/DL (ref 8.7–10.5)
CHLORIDE SERPL-SCNC: 109 MMOL/L (ref 95–110)
CO2 SERPL-SCNC: 22 MMOL/L (ref 23–29)
CREAT SERPL-MCNC: 1.4 MG/DL (ref 0.5–1.4)
DIFFERENTIAL METHOD BLD: ABNORMAL
EOSINOPHIL # BLD AUTO: 0.2 K/UL (ref 0–0.5)
EOSINOPHIL NFR BLD: 2.2 % (ref 0–8)
ERYTHROCYTE [DISTWIDTH] IN BLOOD BY AUTOMATED COUNT: 14.5 % (ref 11.5–14.5)
EST. GFR  (NO RACE VARIABLE): 53.1 ML/MIN/1.73 M^2
GLUCOSE SERPL-MCNC: 95 MG/DL (ref 70–110)
HCT VFR BLD AUTO: 36.5 % (ref 40–54)
HCV RNA SERPL NAA+PROBE-LOG IU: 7.13 LOGIU/ML
HCV RNA SERPL QL NAA+PROBE: DETECTED
HCV RNA SPEC NAA+PROBE-ACNC: ABNORMAL IU/ML
HGB BLD-MCNC: 11.3 G/DL (ref 14–18)
IMM GRANULOCYTES # BLD AUTO: 0.04 K/UL (ref 0–0.04)
IMM GRANULOCYTES NFR BLD AUTO: 0.4 % (ref 0–0.5)
LYMPHOCYTES # BLD AUTO: 1.5 K/UL (ref 1–4.8)
LYMPHOCYTES NFR BLD: 15.6 % (ref 18–48)
MAGNESIUM SERPL-MCNC: 1.8 MG/DL (ref 1.6–2.6)
MCH RBC QN AUTO: 30.5 PG (ref 27–31)
MCHC RBC AUTO-ENTMCNC: 31 G/DL (ref 32–36)
MCV RBC AUTO: 99 FL (ref 82–98)
MONOCYTES # BLD AUTO: 1.4 K/UL (ref 0.3–1)
MONOCYTES NFR BLD: 14.9 % (ref 4–15)
NEUTROPHILS # BLD AUTO: 6.4 K/UL (ref 1.8–7.7)
NEUTROPHILS NFR BLD: 66.5 % (ref 38–73)
NRBC BLD-RTO: 0 /100 WBC
OVALOCYTES BLD QL SMEAR: ABNORMAL
PHOSPHATE SERPL-MCNC: 2.1 MG/DL (ref 2.7–4.5)
PLATELET # BLD AUTO: 125 K/UL (ref 150–450)
PMV BLD AUTO: 12.6 FL (ref 9.2–12.9)
POIKILOCYTOSIS BLD QL SMEAR: SLIGHT
POLYCHROMASIA BLD QL SMEAR: ABNORMAL
POTASSIUM SERPL-SCNC: 4.3 MMOL/L (ref 3.5–5.1)
PROT SERPL-MCNC: 7.4 G/DL (ref 6–8.4)
RBC # BLD AUTO: 3.7 M/UL (ref 4.6–6.2)
SODIUM SERPL-SCNC: 138 MMOL/L (ref 136–145)
WBC # BLD AUTO: 9.66 K/UL (ref 3.9–12.7)

## 2024-12-26 PROCEDURE — 25000003 PHARM REV CODE 250

## 2024-12-26 PROCEDURE — 84100 ASSAY OF PHOSPHORUS: CPT

## 2024-12-26 PROCEDURE — 63600175 PHARM REV CODE 636 W HCPCS: Performed by: HOSPITALIST

## 2024-12-26 PROCEDURE — 83735 ASSAY OF MAGNESIUM: CPT

## 2024-12-26 PROCEDURE — 11000001 HC ACUTE MED/SURG PRIVATE ROOM

## 2024-12-26 PROCEDURE — 80053 COMPREHEN METABOLIC PANEL: CPT

## 2024-12-26 PROCEDURE — 85025 COMPLETE CBC W/AUTO DIFF WBC: CPT

## 2024-12-26 PROCEDURE — 36415 COLL VENOUS BLD VENIPUNCTURE: CPT

## 2024-12-26 RX ORDER — ASCORBIC ACID 500 MG
500 TABLET ORAL 2 TIMES DAILY
COMMUNITY

## 2024-12-26 RX ORDER — FERROUS SULFATE 325(65) MG
325 TABLET, DELAYED RELEASE (ENTERIC COATED) ORAL DAILY
COMMUNITY

## 2024-12-26 RX ORDER — PANTOPRAZOLE SODIUM 40 MG/1
40 TABLET, DELAYED RELEASE ORAL DAILY
COMMUNITY

## 2024-12-26 RX ORDER — MUPIROCIN 20 MG/G
OINTMENT TOPICAL 2 TIMES DAILY
Status: DISCONTINUED | OUTPATIENT
Start: 2024-12-26 | End: 2024-12-30 | Stop reason: HOSPADM

## 2024-12-26 RX ORDER — SODIUM,POTASSIUM PHOSPHATES 280-250MG
2 POWDER IN PACKET (EA) ORAL ONCE
Status: COMPLETED | OUTPATIENT
Start: 2024-12-26 | End: 2024-12-26

## 2024-12-26 RX ORDER — NAPROXEN SODIUM 220 MG/1
81 TABLET, FILM COATED ORAL DAILY
Status: ON HOLD | COMMUNITY
End: 2024-12-29 | Stop reason: HOSPADM

## 2024-12-26 RX ORDER — TIZANIDINE 4 MG/1
4 TABLET ORAL 3 TIMES DAILY PRN
COMMUNITY

## 2024-12-26 RX ADMIN — TAMSULOSIN HYDROCHLORIDE 0.4 MG: 0.4 CAPSULE ORAL at 09:12

## 2024-12-26 RX ADMIN — CLOPIDOGREL BISULFATE 75 MG: 75 TABLET ORAL at 09:12

## 2024-12-26 RX ADMIN — FLUOXETINE HYDROCHLORIDE 20 MG: 20 CAPSULE ORAL at 09:12

## 2024-12-26 RX ADMIN — POTASSIUM & SODIUM PHOSPHATES POWDER PACK 280-160-250 MG 2 PACKET: 280-160-250 PACK at 09:12

## 2024-12-26 RX ADMIN — CEFTRIAXONE 2 G: 2 INJECTION, POWDER, FOR SOLUTION INTRAMUSCULAR; INTRAVENOUS at 12:12

## 2024-12-26 RX ADMIN — LEVETIRACETAM 500 MG: 500 TABLET, FILM COATED ORAL at 08:12

## 2024-12-26 RX ADMIN — FAMOTIDINE 20 MG: 20 TABLET ORAL at 09:12

## 2024-12-26 RX ADMIN — CARVEDILOL 25 MG: 12.5 TABLET, FILM COATED ORAL at 09:12

## 2024-12-26 RX ADMIN — ATORVASTATIN CALCIUM 40 MG: 40 TABLET, FILM COATED ORAL at 08:12

## 2024-12-26 RX ADMIN — CARVEDILOL 25 MG: 12.5 TABLET, FILM COATED ORAL at 04:12

## 2024-12-26 RX ADMIN — MUPIROCIN: 20 OINTMENT TOPICAL at 08:12

## 2024-12-26 RX ADMIN — FINASTERIDE 5 MG: 5 TABLET, FILM COATED ORAL at 09:12

## 2024-12-26 RX ADMIN — LEVETIRACETAM 500 MG: 500 TABLET, FILM COATED ORAL at 09:12

## 2024-12-26 RX ADMIN — CITALOPRAM HYDROBROMIDE 20 MG: 20 TABLET ORAL at 09:12

## 2024-12-26 RX ADMIN — SENNOSIDES 1 TABLET: 8.6 TABLET, FILM COATED ORAL at 08:12

## 2024-12-26 NOTE — ASSESSMENT & PLAN NOTE
74 yoM w/ acute onset dysuria, hematuria, and flank pain. CT abd/pelvis with possible blood products in bladder vs mass w/ associated right ureteral fullness. Urology consulted on admission who placed 22 Fr child w/ return of 500cc dark red urine and significant clot burden was irrigated. Of note the patient is on plavix for h/o stroke (2022) and history of cardiac stenting (2020).    Plavix challenged on 12/25 with repeat hematuria. Will hold plavix until outpatient urology evaluation due to ongoing bleeding.    -- Urology consulted; appreciate recs   -- Urology recommending maintaining child until outpatient follow-up for further hematuria workup  -- Hold Plavix; resume after urology follow-up

## 2024-12-26 NOTE — PLAN OF CARE
Patient continues with sanguinous bloody urinary output. Lora remains in place. MD aware.        Problem: Adult Inpatient Plan of Care  Goal: Plan of Care Review  Outcome: Progressing  Goal: Patient-Specific Goal (Individualized)  Outcome: Progressing  Goal: Absence of Hospital-Acquired Illness or Injury  Outcome: Progressing  Goal: Optimal Comfort and Wellbeing  Outcome: Progressing  Goal: Readiness for Transition of Care  Outcome: Progressing     Problem: Skin Injury Risk Increased  Goal: Skin Health and Integrity  Outcome: Progressing     Problem: Infection  Goal: Absence of Infection Signs and Symptoms  Outcome: Progressing     Problem: Acute Kidney Injury/Impairment  Goal: Fluid and Electrolyte Balance  Outcome: Progressing  Goal: Improved Oral Intake  Outcome: Progressing  Goal: Effective Renal Function  Outcome: Progressing     Problem: Electrolyte Imbalance  Goal: Electrolyte Balance  Outcome: Progressing     Problem: Hemorrhagic Cystitis  Goal: Absence of Hematuria  Outcome: Progressing

## 2024-12-26 NOTE — ASSESSMENT & PLAN NOTE
Anemia is likely due to acute blood loss which was from hematuria and Iron deficiency. Most recent hemoglobin and hematocrit are listed below.  Recent Labs     12/24/24  1135 12/24/24  1147 12/25/24  0618 12/26/24  0443   HGB 12.1*  --  13.2* 11.3*   HCT 36.9* 38 41.3 36.5*       Plan  - Monitor serial CBC: Daily  - Transfuse PRBC if patient becomes hemodynamically unstable, symptomatic or H/H drops below 7/21.  - Patient has not received any PRBC transfusions to date  - Patient's anemia is currently stable

## 2024-12-26 NOTE — ASSESSMENT & PLAN NOTE
Noted history. Hep C Ab positive on admission. Unclear if previously treated.    Hep C quant with [phone]     -- Will need ambulatory referral to hepatology on discharge

## 2024-12-26 NOTE — ASSESSMENT & PLAN NOTE
Patient's blood pressure range in the last 24 hours was: BP  Min: 113/73  Max: 148/72.The patient's inpatient anti-hypertensive regimen is listed below:  Current Antihypertensives  carvediloL tablet 25 mg, 2 times daily with meals, Oral    Plan  - BP is controlled, no changes needed to their regimen  - Titrate home regimen as indicated

## 2024-12-26 NOTE — PLAN OF CARE
Update:     1:00 PM Pt's PALAK changed to 12/27. Doctor is currently holding pt here one more day to make sure hematuria improves.     12/26/24 1258   Post-Acute Status   Post-Acute Authorization Placement   Post-Acute Placement Status Referrals Sent   Discharge Delays None known at this time   Discharge Plan   Discharge Plan A Return to nursing home   Discharge Plan B Return to Nursing Home     Pt is currently FDC at [facility] [phone]. SW/CM sent referral to Nursing Home. SW will send updated orders to Nursing Home when received.     Discharge Plan A and Plan B have been determined by review of patient's clinical status, future medical and therapeutic needs, and coverage/benefits for post-acute care in coordination with multidisciplinary team members.    Doris Bloom MSW, CSW.   Case Management  [facility]  Email: [email or web address]

## 2024-12-26 NOTE — PLAN OF CARE
[facility] - Internal Medicine Telemetry  Initial Discharge Assessment       Primary Care Provider: No, Primary Doctor    Admission Diagnosis: Urinary retention [R33.9]  JOSE CARLOS (acute kidney injury) [N17.9]  Chest pain [R07.9]  Hematuria, unspecified type [R31.9]    Admission Date: 12/24/2024  Expected Discharge Date: 12/26/2024    SW spoke with pt's sister Salome [phone] to complete initial discharge assessment. Pt is a resident at [facility] [phone]. 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 Pt requires assistance from Nursing Home and uses a wheelchair to assist with ambulation.    Post acute was discussed with sister Salome. Pt d/c back to Nursing Home when ready.     Transition of Care Barriers: (P) None    Payor: MEDICARE / Plan: MEDICARE PART A & B / Product Type: Government /     Extended Emergency Contact Information  Primary Emergency Contact: Rosa Maria Tineo  Mobile Phone: [phone]  Relation: Relative  Secondary Emergency Contact: Salome Hampton  Mobile Phone: [phone]  Relation: Sister  Preferred language: English   needed? No    Discharge Plan A: (P) Return to nursing home  Discharge Plan B: (P) Return to Nursing Home    No Pharmacies Listed    Initial Assessment (most recent)       Adult Discharge Assessment - 12/26/24 1206          Discharge Assessment    Assessment Type Discharge Planning Assessment     Confirmed/corrected address, phone number and insurance Yes     Confirmed Demographics Correct on Facesheet     Source of Information family   Sister Salome [phone]    Does patient/caregiver understand observation status Yes     Reason For Admission Gross hematuria     Facility Arrived From: [facility] [phone]     Do you expect to return to your current living situation? Yes     Prior to hospitilization cognitive status: Alert/Oriented     Current cognitive status: Alert/Oriented     Walking or Climbing Stairs  Difficulty yes     Walking or Climbing Stairs ambulation difficulty, assistance 1 person;stair climbing difficulty, assistance 1 person;stair climbing difficulty, requires equipment;ambulation difficulty, requires equipment;transferring difficulty, assistance 1 person;transferring difficulty, requires equipment     Mobility Management Wheelchair (P)      Dressing/Bathing Difficulty yes (P)      Equipment Currently Used at Home wheelchair (P)      Readmission within 30 days? No (P)      Patient currently being followed by outpatient case management? No (P)      Do you currently have service(s) that help you manage your care at home? No (P)      Do you take prescription medications? Yes (P)      Do you have prescription coverage? Yes (P)      Coverage MEDICARE PART A & B (P)      Do you have any problems affording any of your prescribed medications? No (P)      Is the patient taking medications as prescribed? yes (P)      Are you on dialysis? No (P)      Do you take coumadin? No (P)      Discharge Plan A Return to nursing home (P)      Discharge Plan B Return to Nursing Home (P)      DME Needed Upon Discharge  none (P)      Discharge Plan discussed with: Sibling (P)      Name(s) and Number(s) Salome [phone] (P)      Transition of Care Barriers None (P)         Physical Activity    On average, how many days per week do you engage in moderate to strenuous exercise (like a brisk walk)? 0 days (P)      On average, how many minutes do you engage in exercise at this level? 0 min (P)         Financial Resource Strain    How hard is it for you to pay for the very basics like food, housing, medical care, and heating? Not very hard (P)         Housing Stability    In the last 12 months, was there a time when you were not able to pay the mortgage or rent on time? No (P)      At any time in the past 12 months, were you homeless or living in a shelter (including now)? No (P)         Transportation Needs    Has the lack of  transportation kept you from medical appointments, meetings, work or from getting things needed for daily living? No (P)         Food Insecurity    Within the past 12 months, you worried that your food would run out before you got the money to buy more. Never true (P)      Within the past 12 months, the food you bought just didn't last and you didn't have money to get more. Never true (P)         Stress    Do you feel stress - tense, restless, nervous, or anxious, or unable to sleep at night because your mind is troubled all the time - these days? Not at all (P)         Social Isolation    How often do you feel lonely or isolated from those around you?  Never (P)         Alcohol Use    Q1: How often do you have a drink containing alcohol? Never (P)      Q2: How many drinks containing alcohol do you have on a typical day when you are drinking? Patient does not drink (P)      Q3: How often do you have six or more drinks on one occasion? Never (P)         Utilities    In the past 12 months has the electric, gas, oil, or water company threatened to shut off services in your home? No (P)         Health Literacy    How often do you need to have someone help you when you read instructions, pamphlets, or other written material from your doctor or pharmacy? Rarely (P)                    SHANTELL Francis, JOW.   Case Management  [facility]  Email: [email or web address]

## 2024-12-26 NOTE — SUBJECTIVE & OBJECTIVE
Interval History: No acute overnight events. Plavix re-initiated yesterday, however patient with ongoing blood-tinged urine in child this AM. Discussed with urology who evaluated patient. Given patient failed re-initiation of his plavix will hold for now.    Review of Systems   Constitutional:  Negative for activity change, chills, fatigue and fever.   HENT:  Negative for rhinorrhea and sore throat.    Eyes:  Negative for redness and visual disturbance.   Respiratory:  Negative for cough, shortness of breath and wheezing.    Cardiovascular:  Negative for chest pain, palpitations and leg swelling.   Gastrointestinal:  Negative for abdominal distention, abdominal pain, blood in stool, constipation, diarrhea, nausea and vomiting.   Genitourinary:  Positive for hematuria. Negative for difficulty urinating, dysuria and flank pain.   Musculoskeletal:  Negative for back pain.   Skin:  Negative for rash and wound.   Neurological:  Negative for dizziness and light-headedness.   Psychiatric/Behavioral:  Negative for agitation. The patient is not nervous/anxious.      Objective:     Vital Signs (Most Recent):  Temp: 98.9 °F (37.2 °C) (12/26/24 1543)  Pulse: 78 (12/26/24 1543)  Resp: 18 (12/26/24 1543)  BP: 127/83 (12/26/24 1543)  SpO2: 98 % (12/26/24 1543) Vital Signs (24h Range):  Temp:  [97.6 °F (36.4 °C)-99.1 °F (37.3 °C)] 98.9 °F (37.2 °C)  Pulse:  [71-85] 78  Resp:  [18] 18  SpO2:  [96 %-100 %] 98 %  BP: (113-148)/(63-90) 127/83     Weight: 74.2 kg (163 lb 9.3 oz)  Body mass index is 21 kg/m².    Intake/Output Summary (Last 24 hours) at 12/26/2024 1625  Last data filed at 12/26/2024 1457  Gross per 24 hour   Intake 480 ml   Output 1770 ml   Net -1290 ml         Physical Exam  Constitutional:       General: He is not in acute distress.     Appearance: Normal appearance.      Comments: Frail appearing   HENT:      Head: Normocephalic and atraumatic.      Right Ear: External ear normal.      Left Ear: External ear normal.       Nose: No congestion or rhinorrhea.      Mouth/Throat:      Mouth: Mucous membranes are moist.      Pharynx: Oropharynx is clear.   Eyes:      Extraocular Movements: Extraocular movements intact.      Conjunctiva/sclera: Conjunctivae normal.   Cardiovascular:      Rate and Rhythm: Normal rate and regular rhythm.      Pulses: Normal pulses.      Heart sounds: Normal heart sounds. No murmur heard.  Pulmonary:      Effort: Pulmonary effort is normal.      Breath sounds: Normal breath sounds. No wheezing or rales.   Abdominal:      General: Abdomen is flat. There is no distension.      Tenderness: There is no abdominal tenderness. There is no right CVA tenderness, left CVA tenderness or guarding.      Comments: Tense abdominal wall; non-tender to palpation   Genitourinary:     Comments: Blood tinged urine, few small clots  Musculoskeletal:         General: No swelling.      Right lower leg: No edema.      Left lower leg: No edema.   Skin:     General: Skin is warm and dry.      Findings: No rash.   Neurological:      Mental Status: He is alert and oriented to person, place, and time. Mental status is at baseline.      Motor: No weakness.      Comments: Right upper and lower extremity contractures   Psychiatric:         Mood and Affect: Mood normal.         Behavior: Behavior normal.         Significant Labs: All pertinent labs within the past 24 hours have been reviewed.    Significant Imaging: I have reviewed all pertinent imaging results/findings within the past 24 hours.

## 2024-12-26 NOTE — PROGRESS NOTES
[facility] - Internal Medicine [facility]  Progress Note    Patient Name: Wu Hampton  MRN: [id number]  Patient Class: IP- Inpatient   Admission Date: 12/24/2024  Length of Stay: 1 days  Attending Physician: Kilo Dangelo, *  Primary Care Provider: Danielle, Primary Doctor      Subjective     Principal Problem:Gross hematuria      HPI:  Mr. Wu Hampton Jr. is a 74 y.o. 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 Who presents to [facility] ED today with new-onset hematuria, dysuria, and left flank discomfort, which appear to have begun the day of admission. History is somewhat limited by patient's aphasia. He denies fevers, chills, chest pain, nausea/vomiting.    In the ED, he is hemodynamically stable, labs remarkable for WBC 12.46. Hg 12.1. Cr 1.7 (Baseline ~ 1.6-1.8 in November via Care Everywhere). UA nitrite negative  >100 RBC, 20 WBC and rare bacteria. Post-void residual w/ 300. CT scan performed in the ED shows possible blood products in the bladder and right ureteral fullness. Urology consulted who placed a child and irrigated the bladder with return of clot.    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 Presents with new-onset hematuria, dysuria, and left flank discomfort. CT shows right ureteral fullness, hyperdense products in the right side of the bladder consistent with blood products vs mass. Urology consult and irrigated the bladder with removal of clots. Recommended to maintain child upon discharge x5-7 days, Urology will arrange outpatient follow up for gross hematuria workup. His was challenged with re-initiation of his plavix prior to discharging but he had recurrent hematuria so his plavix was  held.    Interval History: No acute overnight events. Plavix re-initiated yesterday, however patient with ongoing blood-tinged urine in child this AM. Discussed with urology who evaluated patient. Given patient failed re-initiation of his plavix will hold for now.    Review of Systems   Constitutional:  Negative for activity change, chills, fatigue and fever.   HENT:  Negative for rhinorrhea and sore throat.    Eyes:  Negative for redness and visual disturbance.   Respiratory:  Negative for cough, shortness of breath and wheezing.    Cardiovascular:  Negative for chest pain, palpitations and leg swelling.   Gastrointestinal:  Negative for abdominal distention, abdominal pain, blood in stool, constipation, diarrhea, nausea and vomiting.   Genitourinary:  Positive for hematuria. Negative for difficulty urinating, dysuria and flank pain.   Musculoskeletal:  Negative for back pain.   Skin:  Negative for rash and wound.   Neurological:  Negative for dizziness and light-headedness.   Psychiatric/Behavioral:  Negative for agitation. The patient is not nervous/anxious.      Objective:     Vital Signs (Most Recent):  Temp: 98.9 °F (37.2 °C) (12/26/24 1543)  Pulse: 78 (12/26/24 1543)  Resp: 18 (12/26/24 1543)  BP: 127/83 (12/26/24 1543)  SpO2: 98 % (12/26/24 1543) Vital Signs (24h Range):  Temp:  [97.6 °F (36.4 °C)-99.1 °F (37.3 °C)] 98.9 °F (37.2 °C)  Pulse:  [71-85] 78  Resp:  [18] 18  SpO2:  [96 %-100 %] 98 %  BP: (113-148)/(63-90) 127/83     Weight: 74.2 kg (163 lb 9.3 oz)  Body mass index is 21 kg/m².    Intake/Output Summary (Last 24 hours) at 12/26/2024 1625  Last data filed at 12/26/2024 1457  Gross per 24 hour   Intake 480 ml   Output 1770 ml   Net -1290 ml         Physical Exam  Constitutional:       General: He is not in acute distress.     Appearance: Normal appearance.      Comments: Frail appearing   HENT:      Head: Normocephalic and atraumatic.      Right Ear: External ear normal.      Left Ear: External ear  normal.      Nose: No congestion or rhinorrhea.      Mouth/Throat:      Mouth: Mucous membranes are moist.      Pharynx: Oropharynx is clear.   Eyes:      Extraocular Movements: Extraocular movements intact.      Conjunctiva/sclera: Conjunctivae normal.   Cardiovascular:      Rate and Rhythm: Normal rate and regular rhythm.      Pulses: Normal pulses.      Heart sounds: Normal heart sounds. No murmur heard.  Pulmonary:      Effort: Pulmonary effort is normal.      Breath sounds: Normal breath sounds. No wheezing or rales.   Abdominal:      General: Abdomen is flat. There is no distension.      Tenderness: There is no abdominal tenderness. There is no right CVA tenderness, left CVA tenderness or guarding.      Comments: Tense abdominal wall; non-tender to palpation   Genitourinary:     Comments: Blood tinged urine, few small clots  Musculoskeletal:         General: No swelling.      Right lower leg: No edema.      Left lower leg: No edema.   Skin:     General: Skin is warm and dry.      Findings: No rash.   Neurological:      Mental Status: He is alert and oriented to person, place, and time. Mental status is at baseline.      Motor: No weakness.      Comments: Right upper and lower extremity contractures   Psychiatric:         Mood and Affect: Mood normal.         Behavior: Behavior normal.         Significant Labs: All pertinent labs within the past 24 hours have been reviewed.    Significant Imaging: I have reviewed all pertinent imaging results/findings within the past 24 hours.    Assessment and Plan     * Gross hematuria  74 yoM w/ acute onset dysuria, hematuria, and flank pain. CT abd/pelvis with possible blood products in bladder vs mass w/ associated right ureteral fullness. Urology consulted on admission who placed 22 Fr child w/ return of 500cc dark red urine and significant clot burden was irrigated. Of note the patient is on plavix for h/o stroke (2022) and history of cardiac stenting (2020).    Plavix  challenged on 12/25 with repeat hematuria. Will hold plavix until outpatient urology evaluation due to ongoing bleeding.    -- Urology consulted; appreciate recs   -- Urology recommending maintaining child until outpatient follow-up for further hematuria workup  -- Hold Plavix; resume after urology follow-up    JOSE CARLOS (acute kidney injury)  Unclear if JOSE CARLOS vs CKD on admission. Historically his Cr was 1.86 on 11/8/24 at which time he was admitted at [facility] w/ upper GI bleed and had improved to 1.6 on 11/9/24 with IV fluids. Possibly at his baseline ~ 1.7, but will workup for JOSE CARLOS.    If JOSE CARLOS, then likely due to post-obstructive d/t hematuria w/ clots . Baseline creatinine is unknown. Most recent creatinine and eGFR are listed below.  Recent Labs     12/24/24  1135 12/25/24  0618 12/26/24  0443   CREATININE 1.7* 1.4 1.4   EGFRNORACEVR 42.0* 53.1* 53.1*        Plan  - CT abd/pelvis with right-sided hydronephrosis suspected 2/2 clots since irrigated w/ urology   - Consider follow-up renal US if Cr worsens  - JOSE CARLOS is stable  - Avoid nephrotoxins and renally dose meds for GFR listed above  - Monitor urine output, serial BMP, and adjust therapy as needed    History of hepatitis C  Noted history. Hep C Ab positive on admission. Unclear if previously treated.    Hep C quant with 13,339,[id number]     -- Will need ambulatory referral to hepatology on discharge    Normocytic anemia  Anemia is likely due to acute blood loss which was from hematuria and Iron deficiency. Most recent hemoglobin and hematocrit are listed below.  Recent Labs     12/24/24  1135 12/24/24  1147 12/25/24  0618 12/26/24  0443   HGB 12.1*  --  13.2* 11.3*   HCT 36.9* 38 41.3 36.5*       Plan  - Monitor serial CBC: Daily  - Transfuse PRBC if patient becomes hemodynamically unstable, symptomatic or H/H drops below 7/21.  - Patient has not received any PRBC transfusions to date  - Patient's anemia is currently stable    Essential hypertension  Patient's blood pressure range  in the last 24 hours was: BP  Min: 113/73  Max: 148/72.The patient's inpatient anti-hypertensive regimen is listed below:  Current Antihypertensives  carvediloL tablet 25 mg, 2 times daily with meals, Oral    Plan  - BP is controlled, no changes needed to their regimen  - Titrate home regimen as indicated    BPH (benign prostatic hyperplasia)  Chronic condition.    -- Continue home Tamsulosin 0.4 mg daily  -- Continue home finasteride 5 mg daily    History of CVA with residual deficit  H/o CVA w/ right-sided weakness and contractures with dysarthria.    -- Holding plavix due to hematuria  -- Continue atorvastatin 40 mg daily  -- Q2h turns      VTE Risk Mitigation (From admission, onward)           Ordered     Reason for No Pharmacological VTE Prophylaxis  Once        Question:  Reasons:  Answer:  Active Bleeding    12/24/24 1919     IP VTE HIGH RISK PATIENT  Once         12/24/24 1919     Place sequential compression device  Until discontinued         12/24/24 1919                    Discharge Planning   PALAK: 12/27/2024     Code Status: Full Code   Medical Readiness for Discharge Date:   Discharge Plan A: Return to nursing home   Discharge Delays: None known at this time      Tyrese France MD  Department of Hospital Medicine   [facility] - Internal Medicine Telemetry

## 2024-12-26 NOTE — PLAN OF CARE
Problem: Skin Injury Risk Increased  Goal: Skin Health and Integrity  Outcome: Progressing     Problem: Infection  Goal: Absence of Infection Signs and Symptoms  Outcome: Progressing     Problem: Adult Inpatient Plan of Care  Goal: Plan of Care Review  Outcome: Progressing  Goal: Patient-Specific Goal (Individualized)  Outcome: Progressing  Goal: Absence of Hospital-Acquired Illness or Injury  Outcome: Progressing  Goal: Optimal Comfort and Wellbeing  Outcome: Progressing  Goal: Readiness for Transition of Care  Outcome: Progressing     Problem: Acute Kidney Injury/Impairment  Goal: Fluid and Electrolyte Balance  Outcome: Progressing  Goal: Improved Oral Intake  Outcome: Progressing  Goal: Effective Renal Function  Outcome: Progressing     Problem: Electrolyte Imbalance  Goal: Electrolyte Balance  Outcome: Progressing

## 2024-12-26 NOTE — ASSESSMENT & PLAN NOTE
Unclear if JOSE CARLOS vs CKD on admission. Historically his Cr was 1.86 on 11/8/24 at which time he was admitted at [facility] w/ upper GI bleed and had improved to 1.6 on 11/9/24 with IV fluids. Possibly at his baseline ~ 1.7, but will workup for JOSE CARLOS.    If JOSE CARLOS, then likely due to post-obstructive d/t hematuria w/ clots . Baseline creatinine is unknown. Most recent creatinine and eGFR are listed below.  Recent Labs     12/24/24  1135 12/25/24  0618 12/26/24  0443   CREATININE 1.7* 1.4 1.4   EGFRNORACEVR 42.0* 53.1* 53.1*        Plan  - CT abd/pelvis with right-sided hydronephrosis suspected 2/2 clots since irrigated w/ urology   - Consider follow-up renal US if Cr worsens  - JOSE CARLOS is stable  - Avoid nephrotoxins and renally dose meds for GFR listed above  - Monitor urine output, serial BMP, and adjust therapy as needed

## 2024-12-27 LAB
ALBUMIN SERPL BCP-MCNC: 2.9 G/DL (ref 3.5–5.2)
ALP SERPL-CCNC: 84 U/L (ref 40–150)
ALT SERPL W/O P-5'-P-CCNC: 29 U/L (ref 10–44)
ANION GAP SERPL CALC-SCNC: 6 MMOL/L (ref 8–16)
ANISOCYTOSIS BLD QL SMEAR: SLIGHT
AST SERPL-CCNC: 30 U/L (ref 10–40)
BASOPHILS # BLD AUTO: 0.01 K/UL (ref 0–0.2)
BASOPHILS NFR BLD: 0.1 % (ref 0–1.9)
BILIRUB SERPL-MCNC: 0.2 MG/DL (ref 0.1–1)
BUN SERPL-MCNC: 22 MG/DL (ref 8–23)
CALCIUM SERPL-MCNC: 8.1 MG/DL (ref 8.7–10.5)
CHLORIDE SERPL-SCNC: 112 MMOL/L (ref 95–110)
CO2 SERPL-SCNC: 20 MMOL/L (ref 23–29)
CREAT SERPL-MCNC: 1.2 MG/DL (ref 0.5–1.4)
DIFFERENTIAL METHOD BLD: ABNORMAL
EOSINOPHIL # BLD AUTO: 0.3 K/UL (ref 0–0.5)
EOSINOPHIL NFR BLD: 2.5 % (ref 0–8)
ERYTHROCYTE [DISTWIDTH] IN BLOOD BY AUTOMATED COUNT: 14.4 % (ref 11.5–14.5)
EST. GFR  (NO RACE VARIABLE): >60 ML/MIN/1.73 M^2
GIANT PLATELETS BLD QL SMEAR: PRESENT
GLUCOSE SERPL-MCNC: 97 MG/DL (ref 70–110)
HCT VFR BLD AUTO: 33 % (ref 40–54)
HGB BLD-MCNC: 10.6 G/DL (ref 14–18)
HYPOCHROMIA BLD QL SMEAR: ABNORMAL
IMM GRANULOCYTES # BLD AUTO: 0.04 K/UL (ref 0–0.04)
IMM GRANULOCYTES NFR BLD AUTO: 0.4 % (ref 0–0.5)
LYMPHOCYTES # BLD AUTO: 2.3 K/UL (ref 1–4.8)
LYMPHOCYTES NFR BLD: 21.9 % (ref 18–48)
MAGNESIUM SERPL-MCNC: 1.8 MG/DL (ref 1.6–2.6)
MCH RBC QN AUTO: 31 PG (ref 27–31)
MCHC RBC AUTO-ENTMCNC: 32.1 G/DL (ref 32–36)
MCV RBC AUTO: 97 FL (ref 82–98)
MONOCYTES # BLD AUTO: 1.8 K/UL (ref 0.3–1)
MONOCYTES NFR BLD: 17 % (ref 4–15)
NEUTROPHILS # BLD AUTO: 6 K/UL (ref 1.8–7.7)
NEUTROPHILS NFR BLD: 58.1 % (ref 38–73)
NRBC BLD-RTO: 0 /100 WBC
OVALOCYTES BLD QL SMEAR: ABNORMAL
PHOSPHATE SERPL-MCNC: 2.4 MG/DL (ref 2.7–4.5)
PLATELET # BLD AUTO: 118 K/UL (ref 150–450)
PLATELET BLD QL SMEAR: ABNORMAL
PMV BLD AUTO: 12.3 FL (ref 9.2–12.9)
POIKILOCYTOSIS BLD QL SMEAR: SLIGHT
POTASSIUM SERPL-SCNC: 4 MMOL/L (ref 3.5–5.1)
PROT SERPL-MCNC: 7 G/DL (ref 6–8.4)
RBC # BLD AUTO: 3.42 M/UL (ref 4.6–6.2)
SCHISTOCYTES BLD QL SMEAR: ABNORMAL
SCHISTOCYTES BLD QL SMEAR: PRESENT
SODIUM SERPL-SCNC: 138 MMOL/L (ref 136–145)
SPHEROCYTES BLD QL SMEAR: ABNORMAL
WBC # BLD AUTO: 10.37 K/UL (ref 3.9–12.7)

## 2024-12-27 PROCEDURE — 85025 COMPLETE CBC W/AUTO DIFF WBC: CPT

## 2024-12-27 PROCEDURE — 84100 ASSAY OF PHOSPHORUS: CPT

## 2024-12-27 PROCEDURE — 63600175 PHARM REV CODE 636 W HCPCS: Performed by: HOSPITALIST

## 2024-12-27 PROCEDURE — 25000003 PHARM REV CODE 250

## 2024-12-27 PROCEDURE — 80053 COMPREHEN METABOLIC PANEL: CPT

## 2024-12-27 PROCEDURE — 11000001 HC ACUTE MED/SURG PRIVATE ROOM

## 2024-12-27 PROCEDURE — 83735 ASSAY OF MAGNESIUM: CPT

## 2024-12-27 PROCEDURE — 36415 COLL VENOUS BLD VENIPUNCTURE: CPT

## 2024-12-27 RX ORDER — SODIUM,POTASSIUM PHOSPHATES 280-250MG
2 POWDER IN PACKET (EA) ORAL ONCE
Status: COMPLETED | OUTPATIENT
Start: 2024-12-27 | End: 2024-12-27

## 2024-12-27 RX ADMIN — FLUOXETINE HYDROCHLORIDE 20 MG: 20 CAPSULE ORAL at 08:12

## 2024-12-27 RX ADMIN — CITALOPRAM HYDROBROMIDE 20 MG: 20 TABLET ORAL at 08:12

## 2024-12-27 RX ADMIN — FAMOTIDINE 20 MG: 20 TABLET ORAL at 08:12

## 2024-12-27 RX ADMIN — MUPIROCIN: 20 OINTMENT TOPICAL at 08:12

## 2024-12-27 RX ADMIN — CARVEDILOL 25 MG: 12.5 TABLET, FILM COATED ORAL at 04:12

## 2024-12-27 RX ADMIN — LEVETIRACETAM 500 MG: 500 TABLET, FILM COATED ORAL at 08:12

## 2024-12-27 RX ADMIN — TAMSULOSIN HYDROCHLORIDE 0.4 MG: 0.4 CAPSULE ORAL at 08:12

## 2024-12-27 RX ADMIN — POTASSIUM & SODIUM PHOSPHATES POWDER PACK 280-160-250 MG 2 PACKET: 280-160-250 PACK at 11:12

## 2024-12-27 RX ADMIN — POLYETHYLENE GLYCOL 3350 17 G: 17 POWDER, FOR SOLUTION ORAL at 02:12

## 2024-12-27 RX ADMIN — CARVEDILOL 25 MG: 12.5 TABLET, FILM COATED ORAL at 08:12

## 2024-12-27 RX ADMIN — ATORVASTATIN CALCIUM 40 MG: 40 TABLET, FILM COATED ORAL at 09:12

## 2024-12-27 RX ADMIN — FINASTERIDE 5 MG: 5 TABLET, FILM COATED ORAL at 08:12

## 2024-12-27 RX ADMIN — CEFTRIAXONE 2 G: 2 INJECTION, POWDER, FOR SOLUTION INTRAMUSCULAR; INTRAVENOUS at 10:12

## 2024-12-27 RX ADMIN — LEVETIRACETAM 500 MG: 500 TABLET, FILM COATED ORAL at 09:12

## 2024-12-27 RX ADMIN — MUPIROCIN: 20 OINTMENT TOPICAL at 09:12

## 2024-12-27 RX ADMIN — SENNOSIDES 1 TABLET: 8.6 TABLET, FILM COATED ORAL at 09:12

## 2024-12-27 RX ADMIN — CYCLOBENZAPRINE HYDROCHLORIDE 5 MG: 5 TABLET, FILM COATED ORAL at 09:12

## 2024-12-27 NOTE — PLAN OF CARE
Problem: Skin Injury Risk Increased  Goal: Skin Health and Integrity  Outcome: Progressing     Problem: Infection  Goal: Absence of Infection Signs and Symptoms  Outcome: Progressing     Problem: Adult Inpatient Plan of Care  Goal: Plan of Care Review  Outcome: Progressing  Goal: Patient-Specific Goal (Individualized)  Outcome: Progressing  Goal: Absence of Hospital-Acquired Illness or Injury  Outcome: Progressing  Goal: Optimal Comfort and Wellbeing  Outcome: Progressing  Goal: Readiness for Transition of Care  Outcome: Progressing     Problem: Acute Kidney Injury/Impairment  Goal: Fluid and Electrolyte Balance  Outcome: Progressing  Goal: Improved Oral Intake  Outcome: Progressing  Goal: Effective Renal Function  Outcome: Progressing     Problem: Electrolyte Imbalance  Goal: Electrolyte Balance  Outcome: Progressing     Problem: Hemorrhagic Cystitis  Goal: Absence of Hematuria  Outcome: Progressing

## 2024-12-27 NOTE — ASSESSMENT & PLAN NOTE
74 yoM w/ acute onset dysuria, hematuria, and flank pain. CT abd/pelvis with possible blood products in bladder vs mass w/ associated right ureteral fullness. Urology consulted on admission who placed 22 Fr child w/ return of 500cc dark red urine and significant clot burden was irrigated. Of note the patient is on plavix for h/o stroke (2022) and history of cardiac stenting (2020).    Plavix challenged on 12/25 with repeat hematuria. Will hold plavix until outpatient urology evaluation due to ongoing bleeding.His urine appears bloody 12/27, a message was sent via secure chat to Urology, and there is no plan for a scope at this time.    -- Urology consulted; appreciate recs  -- Urology recommending maintaining child until outpatient follow-up for further hematuria workup  -- Holding Plavix; resume after urology follow-up

## 2024-12-27 NOTE — ASSESSMENT & PLAN NOTE
Anemia is likely due to acute blood loss which was from hematuria and Iron deficiency. Most recent hemoglobin and hematocrit are listed below.  Recent Labs     12/25/24  0618 12/26/24  0443 12/27/24  0424   HGB 13.2* 11.3* 10.6*   HCT 41.3 36.5* 33.0*       Plan  - Monitor serial CBC: Daily  - Transfuse PRBC if patient becomes hemodynamically unstable, symptomatic or H/H drops below 7/21.  - Patient has not received any PRBC transfusions to date  - Patient's anemia is currently stable

## 2024-12-27 NOTE — PLAN OF CARE
Problem: Skin Injury Risk Increased  Goal: Skin Health and Integrity  Outcome: Progressing     Problem: Infection  Goal: Absence of Infection Signs and Symptoms  Outcome: Progressing     Problem: Adult Inpatient Plan of Care  Goal: Plan of Care Review  Outcome: Progressing  Goal: Patient-Specific Goal (Individualized)  Outcome: Progressing  Goal: Absence of Hospital-Acquired Illness or Injury  Outcome: Progressing

## 2024-12-27 NOTE — PROGRESS NOTES
[facility] - Internal Medicine [facility] Medicine  Progress Note    Patient Name: Wu Hampton  MRN: [id number]  Patient Class: IP- Inpatient   Admission Date: 12/24/2024  Length of Stay: 2 days  Attending Physician: Kilo Dangelo, *  Primary Care Provider: Danielle, Primary Doctor        Subjective     Principal Problem:Gross hematuria        HPI:  Mr. Wu Hampton Jr. is a 74 y.o. 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 Who presents to [facility] ED today with new-onset hematuria, dysuria, and left flank discomfort, which appear to have begun the day of admission. History is somewhat limited by patient's aphasia. He denies fevers, chills, chest pain, nausea/vomiting.    In the ED, he is hemodynamically stable, labs remarkable for WBC 12.46. Hg 12.1. Cr 1.7 (Baseline ~ 1.6-1.8 in November via Care Everywhere). UA nitrite negative  >100 RBC, 20 WBC and rare bacteria. Post-void residual w/ 300. CT scan performed in the ED shows possible blood products in the bladder and right ureteral fullness. Urology consulted who placed a child and irrigated the bladder with return of clot.    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 Presents with new-onset hematuria, dysuria, and left flank discomfort. CT shows right ureteral fullness, hyperdense products in the right side of the bladder consistent with blood products vs mass. Urology consult and irrigated the bladder with removal of clots. Recommended to maintain child upon discharge x5-7 days, Urology will arrange outpatient follow up for gross hematuria workup. His was challenged with re-initiation of his plavix prior to discharging but he had recurrent hematuria so his plavix was  held.    Interval History: No active events overnight. He is at his cognitive baseline, with stable vital signs. Labs are notable for hypophosphatemia, which was repleted. UC shows no growth to date. His urine appears bloody, a message was sent via secure chat to Urology, and there is no plan for a scope at this time. We will continue to hold Plavix and monitor hemoglobin levels    Review of Systems  Objective:     Vital Signs (Most Recent):  Temp: 98.3 °F (36.8 °C) (12/27/24 1157)  Pulse: 73 (12/27/24 1157)  Resp: 18 (12/27/24 1157)  BP: 117/61 (12/27/24 1157)  SpO2: 97 % (12/27/24 1157) Vital Signs (24h Range):  Temp:  [98.3 °F (36.8 °C)-99.2 °F (37.3 °C)] 98.3 °F (36.8 °C)  Pulse:  [73-81] 73  Resp:  [18] 18  SpO2:  [93 %-98 %] 97 %  BP: (107-140)/(60-83) 117/61     Weight: 74.2 kg (163 lb 9.3 oz)  Body mass index is 21 kg/m².    Intake/Output Summary (Last 24 hours) at 12/27/2024 1324  Last data filed at 12/27/2024 1109  Gross per 24 hour   Intake 540 ml   Output 1550 ml   Net -1010 ml         Physical Exam  Constitutional:       General: He is not in acute distress.     Appearance: Normal appearance.      Comments: Frail appearing   HENT:      Head: Normocephalic and atraumatic.      Nose: No congestion or rhinorrhea.      Mouth/Throat:      Mouth: Mucous membranes are moist.   Eyes:      Conjunctiva/sclera: Conjunctivae normal.   Cardiovascular:      Rate and Rhythm: Normal rate and regular rhythm.      Pulses: Normal pulses.      Heart sounds: Normal heart sounds. No murmur heard.  Pulmonary:      Effort: Pulmonary effort is normal.      Breath sounds: Normal breath sounds. No wheezing or rales.   Abdominal:      General: Abdomen is flat. There is no distension.      Tenderness: There is no abdominal tenderness.      Comments: Tense abdominal wall; non-tender to palpation   Genitourinary:     Comments: Blood tinged urine, few small clots  Musculoskeletal:         General: No swelling.      Right lower leg: No  edema.      Left lower leg: No edema.   Skin:     General: Skin is warm and dry.      Findings: No rash.   Neurological:      Mental Status: He is alert and oriented to person, place, and time. Mental status is at baseline.      Motor: No weakness.      Comments: Right upper and lower extremity contractures   Psychiatric:         Mood and Affect: Mood normal.         Behavior: Behavior normal.             Significant Labs: All pertinent labs within the past 24 hours have been reviewed.    Significant Imaging: I have reviewed all pertinent imaging results/findings within the past 24 hours.    Assessment and Plan     * Gross hematuria  74 yoM w/ acute onset dysuria, hematuria, and flank pain. CT abd/pelvis with possible blood products in bladder vs mass w/ associated right ureteral fullness. Urology consulted on admission who placed 22 Fr child w/ return of 500cc dark red urine and significant clot burden was irrigated. Of note the patient is on plavix for h/o stroke (2022) and history of cardiac stenting (2020).    Plavix challenged on 12/25 with repeat hematuria. Will hold plavix until outpatient urology evaluation due to ongoing bleeding.His urine appears bloody 12/27, a message was sent via secure chat to Urology, and there is no plan for a scope at this time.    -- Urology consulted; appreciate recs  -- Urology recommending maintaining child until outpatient follow-up for further hematuria workup  -- Holding Plavix; resume after urology follow-up    History of hepatitis C  Noted history. Hep C Ab positive on admission. Unclear if previously treated.    Hep C quant with [phone]     -- Will need ambulatory referral to hepatology on discharge    Normocytic anemia  Anemia is likely due to acute blood loss which was from hematuria and Iron deficiency. Most recent hemoglobin and hematocrit are listed below.  Recent Labs     12/25/24  0618 12/26/24  0443 12/27/24  0424   HGB 13.2* 11.3* 10.6*   HCT 41.3 36.5* 33.0*        Plan  - Monitor serial CBC: Daily  - Transfuse PRBC if patient becomes hemodynamically unstable, symptomatic or H/H drops below 7/21.  - Patient has not received any PRBC transfusions to date  - Patient's anemia is currently stable    Essential hypertension  Patient's blood pressure range in the last 24 hours was: BP  Min: 113/73  Max: 148/72.The patient's inpatient anti-hypertensive regimen is listed below:  Current Antihypertensives  carvediloL tablet 25 mg, 2 times daily with meals, Oral    Plan  - BP is controlled, no changes needed to their regimen  - Titrate home regimen as indicated    BPH (benign prostatic hyperplasia)  Chronic condition.    -- Continue home Tamsulosin 0.4 mg daily  -- Continue home finasteride 5 mg daily    History of CVA with residual deficit  H/o CVA w/ right-sided weakness and contractures with dysarthria.    -- Holding plavix due to hematuria  -- Continue atorvastatin 40 mg daily  -- Q2h turns    JOSE CARLOS (acute kidney injury)  Unclear if JOSE CARLOS vs CKD on admission. Historically his Cr was 1.86 on 11/8/24 at which time [place] [facility] [place] and had improved to 1.6 on 11/9/24 with IV fluids. Possibly at his baseline ~ 1.7, but will workup for JOSE CARLOS.    If JOSE CARLOS, then likely due to post-obstructive d/t hematuria w/ clots . Baseline creatinine is unknown. Most recent creatinine and eGFR are listed below.  Recent Labs     12/25/24  0618 12/26/24  0443 12/27/24  0424   CREATININE 1.4 1.4 1.2   EGFRNORACEVR 53.1* 53.1* >60.0        Plan  - CT abd/pelvis with right-sided hydronephrosis suspected 2/2 clots since irrigated w/ urology   - Consider follow-up renal US if Cr worsens  - JOSE CARLOS is stable  - Avoid nephrotoxins and renally dose meds for GFR listed above  - Monitor urine output, serial BMP, and adjust therapy as needed      VTE Risk Mitigation (From admission, onward)           Ordered     Reason for No Pharmacological VTE Prophylaxis  Once        Question:  Reasons:  Answer:   Active Bleeding    12/24/24 1919     IP VTE HIGH RISK PATIENT  Once         12/24/24 1919     Place sequential compression device  Until discontinued         12/24/24 1919                    Discharge Planning   PALAK: 12/29/2024     Code Status: Full Code   Medical Readiness for Discharge Date:   Discharge Plan A: Return to nursing home   Discharge Delays: None known at this time                    Hoang Wylie MD  Department of Hospital Medicine   [facility] - Internal Medicine Telemetry

## 2024-12-27 NOTE — SUBJECTIVE & OBJECTIVE
Interval History: No active events overnight. He is at his cognitive baseline, with stable vital signs. Labs are notable for hypophosphatemia, which was repleted. UC shows no growth to date. His urine appears bloody, a message was sent via secure chat to Urology, and there is no plan for a scope at this time. We will continue to hold Plavix and monitor hemoglobin levels    Review of Systems  Objective:     Vital Signs (Most Recent):  Temp: 98.3 °F (36.8 °C) (12/27/24 1157)  Pulse: 73 (12/27/24 1157)  Resp: 18 (12/27/24 1157)  BP: 117/61 (12/27/24 1157)  SpO2: 97 % (12/27/24 1157) Vital Signs (24h Range):  Temp:  [98.3 °F (36.8 °C)-99.2 °F (37.3 °C)] 98.3 °F (36.8 °C)  Pulse:  [73-81] 73  Resp:  [18] 18  SpO2:  [93 %-98 %] 97 %  BP: (107-140)/(60-83) 117/61     Weight: 74.2 kg (163 lb 9.3 oz)  Body mass index is 21 kg/m².    Intake/Output Summary (Last 24 hours) at 12/27/2024 1324  Last data filed at 12/27/2024 1109  Gross per 24 hour   Intake 540 ml   Output 1550 ml   Net -1010 ml         Physical Exam  Constitutional:       General: He is not in acute distress.     Appearance: Normal appearance.      Comments: Frail appearing   HENT:      Head: Normocephalic and atraumatic.      Nose: No congestion or rhinorrhea.      Mouth/Throat:      Mouth: Mucous membranes are moist.   Eyes:      Conjunctiva/sclera: Conjunctivae normal.   Cardiovascular:      Rate and Rhythm: Normal rate and regular rhythm.      Pulses: Normal pulses.      Heart sounds: Normal heart sounds. No murmur heard.  Pulmonary:      Effort: Pulmonary effort is normal.      Breath sounds: Normal breath sounds. No wheezing or rales.   Abdominal:      General: Abdomen is flat. There is no distension.      Tenderness: There is no abdominal tenderness.      Comments: Tense abdominal wall; non-tender to palpation   Genitourinary:     Comments: Blood tinged urine, few small clots  Musculoskeletal:         General: No swelling.      Right lower leg: No edema.       Left lower leg: No edema.   Skin:     General: Skin is warm and dry.      Findings: No rash.   Neurological:      Mental Status: He is alert and oriented to person, place, and time. Mental status is at baseline.      Motor: No weakness.      Comments: Right upper and lower extremity contractures   Psychiatric:         Mood and Affect: Mood normal.         Behavior: Behavior normal.             Significant Labs: All pertinent labs within the past 24 hours have been reviewed.    Significant Imaging: I have reviewed all pertinent imaging results/findings within the past 24 hours.

## 2024-12-27 NOTE — ASSESSMENT & PLAN NOTE
Unclear if JOSE CARLOS vs CKD on admission. Historically his Cr was 1.86 on 11/8/24 at which time he was [place] [facility] [place]/ [place] GI bleed and had improved to 1.6 on 11/9/24 with IV fluids. Possibly at his baseline ~ 1.7, but will workup for JOSE CARLOS.    If JOSE CARLOS, then likely due to post-obstructive d/t hematuria w/ clots . Baseline creatinine is unknown. Most recent creatinine and eGFR are listed below.  Recent Labs     12/25/24  0618 12/26/24  0443 12/27/24  0424   CREATININE 1.4 1.4 1.2   EGFRNORACEVR 53.1* 53.1* >60.0        Plan  - CT abd/pelvis with right-sided hydronephrosis suspected 2/2 clots since irrigated w/ urology   - Consider follow-up renal US if Cr worsens  - JOSE CARLOS is stable  - Avoid nephrotoxins and renally dose meds for GFR listed above  - Monitor urine output, serial BMP, and adjust therapy as needed

## 2024-12-28 LAB
ALBUMIN SERPL BCP-MCNC: 2.7 G/DL (ref 3.5–5.2)
ALP SERPL-CCNC: 81 U/L (ref 40–150)
ALT SERPL W/O P-5'-P-CCNC: 30 U/L (ref 10–44)
ANION GAP SERPL CALC-SCNC: 8 MMOL/L (ref 8–16)
ANISOCYTOSIS BLD QL SMEAR: SLIGHT
AST SERPL-CCNC: 29 U/L (ref 10–40)
BASOPHILS # BLD AUTO: 0.01 K/UL (ref 0–0.2)
BASOPHILS NFR BLD: 0.1 % (ref 0–1.9)
BILIRUB SERPL-MCNC: 0.2 MG/DL (ref 0.1–1)
BUN SERPL-MCNC: 21 MG/DL (ref 8–23)
CALCIUM SERPL-MCNC: 8 MG/DL (ref 8.7–10.5)
CHLORIDE SERPL-SCNC: 109 MMOL/L (ref 95–110)
CO2 SERPL-SCNC: 21 MMOL/L (ref 23–29)
CREAT SERPL-MCNC: 1.3 MG/DL (ref 0.5–1.4)
DIFFERENTIAL METHOD BLD: ABNORMAL
EOSINOPHIL # BLD AUTO: 0.2 K/UL (ref 0–0.5)
EOSINOPHIL NFR BLD: 2.6 % (ref 0–8)
ERYTHROCYTE [DISTWIDTH] IN BLOOD BY AUTOMATED COUNT: 14.2 % (ref 11.5–14.5)
EST. GFR  (NO RACE VARIABLE): 58 ML/MIN/1.73 M^2
GLUCOSE SERPL-MCNC: 96 MG/DL (ref 70–110)
HCT VFR BLD AUTO: 31.4 % (ref 40–54)
HGB BLD-MCNC: 10.3 G/DL (ref 14–18)
IMM GRANULOCYTES # BLD AUTO: 0.03 K/UL (ref 0–0.04)
IMM GRANULOCYTES NFR BLD AUTO: 0.3 % (ref 0–0.5)
LYMPHOCYTES # BLD AUTO: 2.9 K/UL (ref 1–4.8)
LYMPHOCYTES NFR BLD: 31.9 % (ref 18–48)
MAGNESIUM SERPL-MCNC: 1.8 MG/DL (ref 1.6–2.6)
MCH RBC QN AUTO: 31.5 PG (ref 27–31)
MCHC RBC AUTO-ENTMCNC: 32.8 G/DL (ref 32–36)
MCV RBC AUTO: 96 FL (ref 82–98)
MONOCYTES # BLD AUTO: 1.7 K/UL (ref 0.3–1)
MONOCYTES NFR BLD: 18.8 % (ref 4–15)
NEUTROPHILS # BLD AUTO: 4.2 K/UL (ref 1.8–7.7)
NEUTROPHILS NFR BLD: 46.3 % (ref 38–73)
NRBC BLD-RTO: 0 /100 WBC
PHOSPHATE SERPL-MCNC: 3 MG/DL (ref 2.7–4.5)
PLATELET # BLD AUTO: 118 K/UL (ref 150–450)
PLATELET BLD QL SMEAR: ABNORMAL
PMV BLD AUTO: 12.4 FL (ref 9.2–12.9)
POLYCHROMASIA BLD QL SMEAR: ABNORMAL
POTASSIUM SERPL-SCNC: 4.1 MMOL/L (ref 3.5–5.1)
PROT SERPL-MCNC: 6.8 G/DL (ref 6–8.4)
RBC # BLD AUTO: 3.27 M/UL (ref 4.6–6.2)
SODIUM SERPL-SCNC: 138 MMOL/L (ref 136–145)
WBC # BLD AUTO: 9.11 K/UL (ref 3.9–12.7)

## 2024-12-28 PROCEDURE — 36415 COLL VENOUS BLD VENIPUNCTURE: CPT

## 2024-12-28 PROCEDURE — 85025 COMPLETE CBC W/AUTO DIFF WBC: CPT

## 2024-12-28 PROCEDURE — 11000001 HC ACUTE MED/SURG PRIVATE ROOM

## 2024-12-28 PROCEDURE — 84100 ASSAY OF PHOSPHORUS: CPT

## 2024-12-28 PROCEDURE — 25000003 PHARM REV CODE 250

## 2024-12-28 PROCEDURE — 63600175 PHARM REV CODE 636 W HCPCS: Performed by: HOSPITALIST

## 2024-12-28 PROCEDURE — 83735 ASSAY OF MAGNESIUM: CPT

## 2024-12-28 PROCEDURE — 80053 COMPREHEN METABOLIC PANEL: CPT

## 2024-12-28 RX ADMIN — MUPIROCIN: 20 OINTMENT TOPICAL at 09:12

## 2024-12-28 RX ADMIN — TAMSULOSIN HYDROCHLORIDE 0.4 MG: 0.4 CAPSULE ORAL at 09:12

## 2024-12-28 RX ADMIN — FAMOTIDINE 20 MG: 20 TABLET ORAL at 09:12

## 2024-12-28 RX ADMIN — FINASTERIDE 5 MG: 5 TABLET, FILM COATED ORAL at 09:12

## 2024-12-28 RX ADMIN — LEVETIRACETAM 500 MG: 500 TABLET, FILM COATED ORAL at 09:12

## 2024-12-28 RX ADMIN — CEFTRIAXONE 2 G: 2 INJECTION, POWDER, FOR SOLUTION INTRAMUSCULAR; INTRAVENOUS at 11:12

## 2024-12-28 RX ADMIN — CARVEDILOL 25 MG: 12.5 TABLET, FILM COATED ORAL at 04:12

## 2024-12-28 RX ADMIN — ATORVASTATIN CALCIUM 40 MG: 40 TABLET, FILM COATED ORAL at 09:12

## 2024-12-28 RX ADMIN — SENNOSIDES 1 TABLET: 8.6 TABLET, FILM COATED ORAL at 09:12

## 2024-12-28 RX ADMIN — CARVEDILOL 25 MG: 12.5 TABLET, FILM COATED ORAL at 09:12

## 2024-12-28 RX ADMIN — CITALOPRAM HYDROBROMIDE 20 MG: 20 TABLET ORAL at 09:12

## 2024-12-28 NOTE — SUBJECTIVE & OBJECTIVE
Interval History: No active events overnight. Vitals were stable, urine is bloody but less clot than yesterday. Hb- 10.3. Continue to hold Plavix.     Review of Systems   Constitutional:  Negative for fever.   Genitourinary:  Positive for hematuria.     Objective:     Vital Signs (Most Recent):  Temp: 98.2 °F (36.8 °C) (12/28/24 0834)  Pulse: 70 (12/28/24 0834)  Resp: 18 (12/28/24 0834)  BP: 110/64 (12/28/24 0834)  SpO2: 95 % (12/28/24 0834) Vital Signs (24h Range):  Temp:  [97.5 °F (36.4 °C)-98.5 °F (36.9 °C)] 98.2 °F (36.8 °C)  Pulse:  [66-81] 70  Resp:  [17-18] 18  SpO2:  [95 %-98 %] 95 %  BP: (108-130)/(58-75) 110/64     Weight: 74.2 kg (163 lb 9.3 oz)  Body mass index is 21 kg/m².    Intake/Output Summary (Last 24 hours) at 12/28/2024 0916  Last data filed at 12/28/2024 0436  Gross per 24 hour   Intake 540 ml   Output 2100 ml   Net -1560 ml         Physical Exam  Constitutional:       General: He is not in acute distress.     Appearance: Normal appearance.      Comments: Frail appearing   HENT:      Head: Normocephalic and atraumatic.      Nose: No congestion or rhinorrhea.      Mouth/Throat:      Mouth: Mucous membranes are moist.   Eyes:      Conjunctiva/sclera: Conjunctivae normal.   Cardiovascular:      Rate and Rhythm: Normal rate and regular rhythm.      Pulses: Normal pulses.      Heart sounds: Normal heart sounds. No murmur heard.  Pulmonary:      Effort: Pulmonary effort is normal.      Breath sounds: Normal breath sounds. No wheezing or rales.   Abdominal:      General: Abdomen is flat. There is no distension.      Tenderness: There is no abdominal tenderness.   Genitourinary:     Comments: Blood tinged urine.     Musculoskeletal:         General: No swelling.      Right lower leg: No edema.      Left lower leg: No edema.   Skin:     General: Skin is warm and dry.      Findings: No rash.   Neurological:      Mental Status: He is alert and oriented to person, place, and time. Mental status is at  baseline.      Motor: No weakness.      Comments: Right upper and lower extremity contractures   Psychiatric:         Mood and Affect: Mood normal.         Behavior: Behavior normal.             Significant Labs: All pertinent labs within the past 24 hours have been reviewed.    Significant Imaging: I have reviewed all pertinent imaging results/findings within the past 24 hours.

## 2024-12-28 NOTE — PROGRESS NOTES
[facility] - Internal Medicine [facility] Medicine  Progress Note    Patient Name: Wu Hampton  MRN: [id number]  Patient Class: IP- Inpatient   Admission Date: 12/24/2024  Length of Stay: 3 days  Attending Physician: Kilo Dangelo, *  Primary Care Provider: Danielle, Primary Doctor        Subjective     Principal Problem:Gross hematuria        HPI:  Mr. Wu Hampton Jr. is a 74 y.o. 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 [place] [facility] [place] new-onset hematuria, dysuria, and left flank discomfort, which appear to have begun the day of admission. History is somewhat limited by patient's aphasia. He denies fevers, chills, chest pain, nausea/vomiting.    In the ED, he is hemodynamically stable, labs remarkable for WBC 12.46. Hg 12.1. Cr 1.7 (Baseline ~ 1.6-1.8 in November via Care Everywhere). UA nitrite negative  >100 RBC, 20 WBC and rare bacteria. Post-void residual w/ 300. CT scan performed in the ED shows possible blood products in the bladder and right ureteral fullness. Urology consulted who placed a child and irrigated the bladder with return of clot.    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 Presents with new-onset hematuria, dysuria, and left flank discomfort. CT shows right ureteral fullness, hyperdense products in the right side of the bladder consistent with blood products vs mass. Urology consult and irrigated the bladder with removal of clots. Recommended to maintain child upon discharge x5-7 days, Urology will arrange outpatient follow up for gross hematuria workup. He was challenged with re-initiation of his plavix prior to discharging but he had recurrent hematuria so his plavix was  held.    Interval History: No active events overnight. Vitals were stable, urine is bloody but less clot than yesterday. Hb- 10.3. Continue to hold Plavix.     Review of Systems   Constitutional:  Negative for fever.   Genitourinary:  Positive for hematuria.     Objective:     Vital Signs (Most Recent):  Temp: 98.2 °F (36.8 °C) (12/28/24 0834)  Pulse: 70 (12/28/24 0834)  Resp: 18 (12/28/24 0834)  BP: 110/64 (12/28/24 0834)  SpO2: 95 % (12/28/24 0834) Vital Signs (24h Range):  Temp:  [97.5 °F (36.4 °C)-98.5 °F (36.9 °C)] 98.2 °F (36.8 °C)  Pulse:  [66-81] 70  Resp:  [17-18] 18  SpO2:  [95 %-98 %] 95 %  BP: (108-130)/(58-75) 110/64     Weight: 74.2 kg (163 lb 9.3 oz)  Body mass index is 21 kg/m².    Intake/Output Summary (Last 24 hours) at 12/28/2024 0916  Last data filed at 12/28/2024 0436  Gross per 24 hour   Intake 540 ml   Output 2100 ml   Net -1560 ml         Physical Exam  Constitutional:       General: He is not in acute distress.     Appearance: Normal appearance.      Comments: Frail appearing   HENT:      Head: Normocephalic and atraumatic.      Nose: No congestion or rhinorrhea.      Mouth/Throat:      Mouth: Mucous membranes are moist.   Eyes:      Conjunctiva/sclera: Conjunctivae normal.   Cardiovascular:      Rate and Rhythm: Normal rate and regular rhythm.      Pulses: Normal pulses.      Heart sounds: Normal heart sounds. No murmur heard.  Pulmonary:      Effort: Pulmonary effort is normal.      Breath sounds: Normal breath sounds. No wheezing or rales.   Abdominal:      General: Abdomen is flat. There is no distension.      Tenderness: There is no abdominal tenderness.   Genitourinary:     Comments: Blood tinged urine.     Musculoskeletal:         General: No swelling.      Right lower leg: No edema.      Left lower leg: No edema.   Skin:     General: Skin is warm and dry.      Findings: No rash.   Neurological:      Mental Status: He is alert and oriented to person, place, and time. Mental status is  at baseline.      Motor: No weakness.      Comments: Right upper and lower extremity contractures   Psychiatric:         Mood and Affect: Mood normal.         Behavior: Behavior normal.             Significant Labs: All pertinent labs within the past 24 hours have been reviewed.    Significant Imaging: I have reviewed all pertinent imaging results/findings within the past 24 hours.    Assessment and Plan     * Gross hematuria  74 yoM w/ acute onset dysuria, hematuria, and flank pain. CT abd/pelvis with possible blood products in bladder vs mass w/ associated right ureteral fullness. Urology consulted on admission who placed 22 Fr child w/ return of 500cc dark red urine and significant clot burden was irrigated. Of note the patient is on plavix for h/o stroke (2022) and history of cardiac stenting (2020).    Plavix challenged on 12/25 with repeat hematuria. Will hold plavix until outpatient urology evaluation due to ongoing bleeding.His urine appears bloody 12/27, a message was sent via secure chat to Urology, and there is no plan for a scope at this time.    -- Urology consulted; appreciate recs  -- Urology recommending maintaining child until outpatient follow-up for further hematuria workup  -- Holding Plavix; resume after urology follow-up    History of hepatitis C  Noted history. Hep C Ab positive on admission. Unclear if previously treated.    Hep C quant with [phone]     -- Will need ambulatory referral to hepatology on discharge    Normocytic anemia  Anemia is likely due to acute blood loss which was from hematuria and Iron deficiency. Most recent hemoglobin and hematocrit are listed below.  Recent Labs     12/26/24  0443 12/27/24  0424 12/28/24  0219   HGB 11.3* 10.6* 10.3*   HCT 36.5* 33.0* 31.4*       Plan  - Monitor serial CBC: Daily  - Transfuse PRBC if patient becomes hemodynamically unstable, symptomatic or H/H drops below 7/21.  - Patient has not received any PRBC transfusions to date  - Patient's  anemia is currently stable    Essential hypertension  Patient's blood pressure range in the last 24 hours was: BP  Min: 108/58  Max: 130/75.The patient's inpatient anti-hypertensive regimen is listed below:  Current Antihypertensives  carvediloL tablet 25 mg, 2 times daily with meals, Oral    Plan  - BP is controlled, no changes needed to their regimen  - Titrate home regimen as indicated    BPH (benign prostatic hyperplasia)  Chronic condition.    -- Continue home Tamsulosin 0.4 mg daily  -- Continue home finasteride 5 mg daily    History of CVA with residual deficit  H/o CVA w/ right-sided weakness and contractures with dysarthria.    -- Holding plavix due to hematuria  -- Continue atorvastatin 40 mg daily  -- Q2h turns    JOSE CARLOS (acute kidney injury)  Unclear if JOSE CARLOS vs CKD on admission. Historically his Cr was 1.86 on 11/8/24 at which time he was [place] [facility] [place]/ [place] and had improved to 1.6 on 11/9/24 with IV fluids. Possibly at his baseline ~ 1.7, but will workup for JOSE CARLOS.    If JOSE CARLOS, then likely due to post-obstructive d/t hematuria w/ clots . Baseline creatinine is unknown. Most recent creatinine and eGFR are listed below.  Recent Labs     12/26/24  0443 12/27/24  0424 12/28/24  0219   CREATININE 1.4 1.2 1.3   EGFRNORACEVR 53.1* >60.0 58.0*        Plan  - CT abd/pelvis with right-sided hydronephrosis suspected 2/2 clots since irrigated w/ urology   - Consider follow-up renal US if Cr worsens  - JOSE CARLOS is stable  - Avoid nephrotoxins and renally dose meds for GFR listed above  - Monitor urine output, serial BMP, and adjust therapy as needed      VTE Risk Mitigation (From admission, onward)           Ordered     Reason for No Pharmacological VTE Prophylaxis  Once        Question:  Reasons:  Answer:  Active Bleeding    12/24/24 1919     IP VTE HIGH RISK PATIENT  Once         12/24/24 1919     Place sequential compression device  Until discontinued         12/24/24 1919                    Discharge Planning    PALAK: 12/29/2024     Code Status: Full Code   Medical Readiness for Discharge Date:   Discharge Plan A: Return to nursing home   Discharge Delays: None known at this time                    Hoang Wylie MD  Department of Hospital Medicine   [facility] - Internal Medicine Telemetry

## 2024-12-28 NOTE — ASSESSMENT & PLAN NOTE
Unclear if JOSE CARLOS vs CKD on admission. Historically his Cr was 1.86 on 11/8/24 at which time he was [place] [facility] [place]/ [place] and had improved to 1.6 on 11/9/24 with IV fluids. Possibly at his baseline ~ 1.7, but will workup for JOSE CARLOS.    If JOSE CARLOS, then likely due to post-obstructive d/t hematuria w/ clots . Baseline creatinine is unknown. Most recent creatinine and eGFR are listed below.  Recent Labs     12/26/24  0443 12/27/24  0424 12/28/24  0219   CREATININE 1.4 1.2 1.3   EGFRNORACEVR 53.1* >60.0 58.0*        Plan  - CT abd/pelvis with right-sided hydronephrosis suspected 2/2 clots since irrigated w/ urology   - Consider follow-up renal US if Cr worsens  - JOSE CARLOS is stable  - Avoid nephrotoxins and renally dose meds for GFR listed above  - Monitor urine output, serial BMP, and adjust therapy as needed

## 2024-12-28 NOTE — ASSESSMENT & PLAN NOTE
Patient's blood pressure range in the last 24 hours was: BP  Min: 108/58  Max: 130/75.The patient's inpatient anti-hypertensive regimen is listed below:  Current Antihypertensives  carvediloL tablet 25 mg, 2 times daily with meals, Oral    Plan  - BP is controlled, no changes needed to their regimen  - Titrate home regimen as indicated

## 2024-12-28 NOTE — PLAN OF CARE
Problem: Skin Injury Risk Increased  Goal: Skin Health and Integrity  Outcome: Progressing     Problem: Infection  Goal: Absence of Infection Signs and Symptoms  Outcome: Progressing     Problem: Adult Inpatient Plan of Care  Goal: Plan of Care Review  Outcome: Progressing  Goal: Patient-Specific Goal (Individualized)  Outcome: Progressing  Goal: Absence of Hospital-Acquired Illness or Injury  Outcome: Progressing  Goal: Optimal Comfort and Wellbeing  Outcome: Progressing

## 2024-12-28 NOTE — ASSESSMENT & PLAN NOTE
Anemia is likely due to acute blood loss which was from hematuria and Iron deficiency. Most recent hemoglobin and hematocrit are listed below.  Recent Labs     12/26/24  0443 12/27/24  0424 12/28/24  0219   HGB 11.3* 10.6* 10.3*   HCT 36.5* 33.0* 31.4*       Plan  - Monitor serial CBC: Daily  - Transfuse PRBC if patient becomes hemodynamically unstable, symptomatic or H/H drops below 7/21.  - Patient has not received any PRBC transfusions to date  - Patient's anemia is currently stable

## 2024-12-29 LAB
ALBUMIN SERPL BCP-MCNC: 2.8 G/DL (ref 3.5–5.2)
ALP SERPL-CCNC: 79 U/L (ref 40–150)
ALT SERPL W/O P-5'-P-CCNC: 36 U/L (ref 10–44)
ANION GAP SERPL CALC-SCNC: 6 MMOL/L (ref 8–16)
ANISOCYTOSIS BLD QL SMEAR: SLIGHT
AST SERPL-CCNC: 35 U/L (ref 10–40)
BASOPHILS # BLD AUTO: 0.01 K/UL (ref 0–0.2)
BASOPHILS NFR BLD: 0.1 % (ref 0–1.9)
BILIRUB SERPL-MCNC: 0.1 MG/DL (ref 0.1–1)
BUN SERPL-MCNC: 22 MG/DL (ref 8–23)
CALCIUM SERPL-MCNC: 8.2 MG/DL (ref 8.7–10.5)
CHLORIDE SERPL-SCNC: 110 MMOL/L (ref 95–110)
CO2 SERPL-SCNC: 21 MMOL/L (ref 23–29)
CREAT SERPL-MCNC: 1.2 MG/DL (ref 0.5–1.4)
DIFFERENTIAL METHOD BLD: ABNORMAL
EOSINOPHIL # BLD AUTO: 0.3 K/UL (ref 0–0.5)
EOSINOPHIL NFR BLD: 2.8 % (ref 0–8)
ERYTHROCYTE [DISTWIDTH] IN BLOOD BY AUTOMATED COUNT: 14.1 % (ref 11.5–14.5)
EST. GFR  (NO RACE VARIABLE): >60 ML/MIN/1.73 M^2
GLUCOSE SERPL-MCNC: 91 MG/DL (ref 70–110)
HCT VFR BLD AUTO: 32.6 % (ref 40–54)
HGB BLD-MCNC: 10.7 G/DL (ref 14–18)
IMM GRANULOCYTES # BLD AUTO: 0.05 K/UL (ref 0–0.04)
IMM GRANULOCYTES NFR BLD AUTO: 0.5 % (ref 0–0.5)
LYMPHOCYTES # BLD AUTO: 3.1 K/UL (ref 1–4.8)
LYMPHOCYTES NFR BLD: 30.8 % (ref 18–48)
MAGNESIUM SERPL-MCNC: 1.9 MG/DL (ref 1.6–2.6)
MCH RBC QN AUTO: 31.8 PG (ref 27–31)
MCHC RBC AUTO-ENTMCNC: 32.8 G/DL (ref 32–36)
MCV RBC AUTO: 97 FL (ref 82–98)
MONOCYTES # BLD AUTO: 1.3 K/UL (ref 0.3–1)
MONOCYTES NFR BLD: 13.1 % (ref 4–15)
NEUTROPHILS # BLD AUTO: 5.3 K/UL (ref 1.8–7.7)
NEUTROPHILS NFR BLD: 52.7 % (ref 38–73)
NRBC BLD-RTO: 0 /100 WBC
OVALOCYTES BLD QL SMEAR: ABNORMAL
PHOSPHATE SERPL-MCNC: 2.8 MG/DL (ref 2.7–4.5)
PLATELET # BLD AUTO: 130 K/UL (ref 150–450)
PLATELET BLD QL SMEAR: ABNORMAL
PMV BLD AUTO: 12.5 FL (ref 9.2–12.9)
POTASSIUM SERPL-SCNC: 4.1 MMOL/L (ref 3.5–5.1)
PROT SERPL-MCNC: 7 G/DL (ref 6–8.4)
RBC # BLD AUTO: 3.37 M/UL (ref 4.6–6.2)
SCHISTOCYTES BLD QL SMEAR: ABNORMAL
SODIUM SERPL-SCNC: 137 MMOL/L (ref 136–145)
SPHEROCYTES BLD QL SMEAR: ABNORMAL
WBC # BLD AUTO: 10.1 K/UL (ref 3.9–12.7)

## 2024-12-29 PROCEDURE — 11000001 HC ACUTE MED/SURG PRIVATE ROOM

## 2024-12-29 PROCEDURE — 63600175 PHARM REV CODE 636 W HCPCS: Performed by: HOSPITALIST

## 2024-12-29 PROCEDURE — 25000003 PHARM REV CODE 250

## 2024-12-29 PROCEDURE — 36415 COLL VENOUS BLD VENIPUNCTURE: CPT

## 2024-12-29 PROCEDURE — 85025 COMPLETE CBC W/AUTO DIFF WBC: CPT

## 2024-12-29 PROCEDURE — 83735 ASSAY OF MAGNESIUM: CPT

## 2024-12-29 PROCEDURE — 84100 ASSAY OF PHOSPHORUS: CPT

## 2024-12-29 PROCEDURE — 80053 COMPREHEN METABOLIC PANEL: CPT

## 2024-12-29 RX ORDER — POLYETHYLENE GLYCOL 3350 17 G/17G
17 POWDER, FOR SOLUTION ORAL 2 TIMES DAILY
Status: COMPLETED | OUTPATIENT
Start: 2024-12-29 | End: 2024-12-29

## 2024-12-29 RX ORDER — POLYETHYLENE GLYCOL 3350 17 G/17G
17 POWDER, FOR SOLUTION ORAL 2 TIMES DAILY PRN
Qty: 510 G | Refills: 0 | Status: SHIPPED | OUTPATIENT
Start: 2024-12-29 | End: 2024-12-30

## 2024-12-29 RX ADMIN — CYCLOBENZAPRINE HYDROCHLORIDE 5 MG: 5 TABLET, FILM COATED ORAL at 04:12

## 2024-12-29 RX ADMIN — POLYETHYLENE GLYCOL 3350 17 G: 17 POWDER, FOR SOLUTION ORAL at 08:12

## 2024-12-29 RX ADMIN — CARVEDILOL 25 MG: 12.5 TABLET, FILM COATED ORAL at 04:12

## 2024-12-29 RX ADMIN — MUPIROCIN: 20 OINTMENT TOPICAL at 08:12

## 2024-12-29 RX ADMIN — FINASTERIDE 5 MG: 5 TABLET, FILM COATED ORAL at 08:12

## 2024-12-29 RX ADMIN — CARVEDILOL 25 MG: 12.5 TABLET, FILM COATED ORAL at 08:12

## 2024-12-29 RX ADMIN — TAMSULOSIN HYDROCHLORIDE 0.4 MG: 0.4 CAPSULE ORAL at 08:12

## 2024-12-29 RX ADMIN — FAMOTIDINE 20 MG: 20 TABLET ORAL at 08:12

## 2024-12-29 RX ADMIN — CITALOPRAM HYDROBROMIDE 20 MG: 20 TABLET ORAL at 08:12

## 2024-12-29 RX ADMIN — LEVETIRACETAM 500 MG: 500 TABLET, FILM COATED ORAL at 08:12

## 2024-12-29 RX ADMIN — ATORVASTATIN CALCIUM 40 MG: 40 TABLET, FILM COATED ORAL at 08:12

## 2024-12-29 RX ADMIN — CEFTRIAXONE 2 G: 2 INJECTION, POWDER, FOR SOLUTION INTRAMUSCULAR; INTRAVENOUS at 11:12

## 2024-12-29 RX ADMIN — SENNOSIDES 1 TABLET: 8.6 TABLET, FILM COATED ORAL at 08:12

## 2024-12-29 NOTE — ASSESSMENT & PLAN NOTE
Anemia is likely due to acute blood loss which was from hematuria and Iron deficiency. Most recent hemoglobin and hematocrit are listed below.  Recent Labs     12/27/24  0424 12/28/24  0219 12/29/24  0230   HGB 10.6* 10.3* 10.7*   HCT 33.0* 31.4* 32.6*       Plan  - Monitor serial CBC: Daily  - Transfuse PRBC if patient becomes hemodynamically unstable, symptomatic or H/H drops below 7/21.  - Patient has not received any PRBC transfusions to date  - Patient's anemia is currently stable

## 2024-12-29 NOTE — PLAN OF CARE
[facility]    FACILITY TRANSFER ORDERS      Patient Name: Wu Hampton  YOB: 1950    PCP: No, Primary Doctor   PCP Address: None  PCP Phone Number: None  PCP Fax: None    Encounter Date: 12/30/2024    Admit to: [facility]    Vital Signs:  Routine    Diagnoses:   Active Hospital Problems    Diagnosis  POA    *Gross hematuria [R31.0]  Yes    JOSE CARLOS (acute kidney injury) [N17.9]  Yes    History of CVA with residual deficit [I69.30]  Not Applicable    BPH (benign prostatic hyperplasia) [N40.0]  Yes    Normocytic anemia [D64.9]  Yes    History of hepatitis C [Z86.19]  Yes    Essential hypertension [I10]  Yes      Resolved Hospital Problems   No resolved problems to display.       Allergies:Review of patient's allergies indicates:  No Known Allergies    Diet: regular diet    Activities: Activity as tolerated    Goals of Care Treatment Preferences:  Code Status: Full Code       LaPOST: Yes           CONSULTS:    Physical Therapy to evaluate and treat.     MISCELLANEOUS CARE:  Child Care: Empty Child bag every shift. Maintain child due to gross hematuria (concern for clot retention; further outpatient evaluation required) upon discharge x5-7 days.      Medications: Review discharge medications with patient and family and provide education.         Medication List        START taking these medications      polyethylene glycol 17 gram/dose powder  Commonly known as: GLYCOLAX  Use cap to measure 17 g, mix with liquid, and take by mouth 2 (two) times daily as needed.            CONTINUE taking these medications      ascorbic acid (vitamin C) 500 MG tablet  Commonly known as: VITAMIN C  Take 500 mg by mouth 2 (two) times daily.     atorvastatin 40 MG tablet  Commonly known as: LIPITOR  Take 40 mg by mouth every evening.     carvediloL 25 MG tablet  Commonly known as: COREG  Take 25 mg by mouth 2 (two) times daily with meals.     citalopram 20 MG tablet  Commonly known as: CeleXA  Take 20  mg by mouth once daily.     ferrous sulfate 325 (65 FE) MG EC tablet  Take 325 mg by mouth once daily.     finasteride 5 mg tablet  Commonly known as: PROSCAR  Take 5 mg by mouth once daily.     FLOMAX 0.4 mg Cap  Generic drug: tamsulosin  Take by mouth once daily.     levETIRAcetam 500 MG Tab  Commonly known as: KEPPRA  Take 1 tablet (500 mg total) by mouth 2 (two) times daily.     meloxicam 15 MG tablet  Commonly known as: MOBIC  Take 15 mg by mouth once daily.     ONE DAILY MULTIVITAMIN per tablet  Generic drug: multivitamin  Take 1 tablet by mouth once daily.     pantoprazole 40 MG tablet  Commonly known as: PROTONIX  Take 40 mg by mouth once daily.     senna 8.6 mg tablet  Commonly known as: SENOKOT  Take 1 tablet by mouth every evening.     tiZANidine 4 MG tablet  Commonly known as: ZANAFLEX  Take 4 mg by mouth 3 (three) times daily. (pain / muscle spasm).            STOP taking these medications      aspirin 81 MG Chew     clopidogreL 75 mg tablet  Commonly known as: PLAVIX     famotidine 20 MG tablet  Commonly known as: PEPCID     FLUoxetine 20 MG capsule                Immunizations Administered as of 12/29/2024       No immunizations on file.            This patient has had both covid vaccinations    Some patients may experience side effects after vaccination.  These may include fever, headache, muscle or joint aches.  Most symptoms resolve with 24-48 hours and do not require urgent medical evaluation unless they persist for more than 72 hours or symptoms are concerning for an unrelated medical condition.          _________________________________  Hoang Wylie MD  12/29/2024

## 2024-12-29 NOTE — SUBJECTIVE & OBJECTIVE
Interval History: No active events overnight. Hematuria improved. We will transfer patient back to his facility tomorrow. Urology follow up within a few weeks to determine if patient should continue to hold Plavix.    Review of Systems  Objective:     Vital Signs (Most Recent):  Temp: 98.2 °F (36.8 °C) (12/29/24 1127)  Pulse: 73 (12/29/24 1127)  Resp: 17 (12/29/24 1127)  BP: 114/70 (12/29/24 1127)  SpO2: 95 % (12/29/24 1127) Vital Signs (24h Range):  Temp:  [97.7 °F (36.5 °C)-98.8 °F (37.1 °C)] 98.2 °F (36.8 °C)  Pulse:  [65-74] 73  Resp:  [17-18] 17  SpO2:  [95 %-96 %] 95 %  BP: (103-131)/(64-80) 114/70     Weight: 74.2 kg (163 lb 9.3 oz)  Body mass index is 21 kg/m².    Intake/Output Summary (Last 24 hours) at 12/29/2024 1420  Last data filed at 12/29/2024 0557  Gross per 24 hour   Intake 240 ml   Output 800 ml   Net -560 ml         Physical Exam  Constitutional:       General: He is not in acute distress.     Appearance: Normal appearance.      Comments: Frail appearing   HENT:      Head: Normocephalic and atraumatic.      Nose: No congestion or rhinorrhea.      Mouth/Throat:      Mouth: Mucous membranes are moist.   Eyes:      Conjunctiva/sclera: Conjunctivae normal.   Cardiovascular:      Rate and Rhythm: Normal rate and regular rhythm.      Pulses: Normal pulses.      Heart sounds: Normal heart sounds. No murmur heard.  Pulmonary:      Effort: Pulmonary effort is normal.      Breath sounds: Normal breath sounds. No wheezing or rales.   Abdominal:      General: Abdomen is flat. There is no distension.      Tenderness: There is no abdominal tenderness.   Genitourinary:     Comments: Blood tinged urine.     Musculoskeletal:         General: No swelling.      Right lower leg: No edema.      Left lower leg: No edema.   Skin:     General: Skin is warm and dry.      Findings: No rash.   Neurological:      Mental Status: He is alert and oriented to person, place, and time. Mental status is at baseline.      Motor: No  weakness.      Comments: Right upper and lower extremity contractures   Psychiatric:         Mood and Affect: Mood normal.         Behavior: Behavior normal.             Significant Labs: All pertinent labs within the past 24 hours have been reviewed.    Significant Imaging: I have reviewed all pertinent imaging results/findings within the past 24 hours.

## 2024-12-29 NOTE — ASSESSMENT & PLAN NOTE
Unclear if JOSE CARLOS vs CKD on admission. Historically his Cr was 1.86 on 11/8/24 at which time he was [place] [facility] [place]/ [place] and had improved to 1.6 on 11/9/24 with IV fluids. Possibly at his baseline ~ 1.7, but will workup for JOSE CARLOS.    If JOSE CARLOS, then likely due to post-obstructive d/t hematuria w/ clots . Baseline creatinine is unknown. Most recent creatinine and eGFR are listed below.  Recent Labs     12/27/24  0424 12/28/24  0219 12/29/24  0230   CREATININE 1.2 1.3 1.2   EGFRNORACEVR >60.0 58.0* >60.0        Plan  - CT abd/pelvis with right-sided hydronephrosis suspected 2/2 clots since irrigated w/ urology   - Consider follow-up renal US if Cr worsens  - JOSE CARLOS is stable  - Avoid nephrotoxins and renally dose meds for GFR listed above  - Monitor urine output, serial BMP, and adjust therapy as needed

## 2024-12-29 NOTE — PLAN OF CARE
Sw informed Pt is stable to dc and return back to NH at [place], Nancy will call facility as Pt is a detention resident and see if Pt can return today. Sw to follow and update team, NH orders needed.

## 2024-12-29 NOTE — PROGRESS NOTES
[facility] - Internal Medicine [facility] Medicine  Progress Note    Patient Name: Wu Hampton  MRN: [id number]  Patient Class: IP- Inpatient   Admission Date: 12/24/2024  Length of Stay: 4 days  Attending Physician: Kilo Dangelo, *  Primary Care Provider: Danielle, Primary Doctor        Subjective     Principal Problem:Gross hematuria        HPI:  Mr. Wu Hampton Jr. is a 74 y.o. 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 Who presents to [facility] ED today with new-onset hematuria, dysuria, and left flank discomfort, which appear to have begun the day of admission. History is somewhat limited by patient's aphasia. He denies fevers, chills, chest pain, nausea/vomiting.    In the ED, he is hemodynamically stable, labs remarkable for WBC 12.46. Hg 12.1. Cr 1.7 (Baseline ~ 1.6-1.8 in November via Care Everywhere). UA nitrite negative  >100 RBC, 20 WBC and rare bacteria. Post-void residual w/ 300. CT scan performed in the ED shows possible blood products in the bladder and right ureteral fullness. Urology consulted who placed a child and irrigated the bladder with return of clot.    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 Presents with new-onset hematuria, dysuria, and left flank discomfort. CT shows right ureteral fullness, hyperdense products in the right side of the bladder consistent with blood products vs mass. Urology consult and irrigated the bladder with removal of clots. Recommended to maintain child upon discharge x5-7 days, Urology will arrange outpatient follow up for gross hematuria workup. He was challenged with re-initiation of his plavix prior to discharging but he had recurrent hematuria so his plavix was  held.    Interval History: No active events overnight. Hematuria improved. We will transfer patient back to his facility tomorrow. Urology follow up within a few weeks to determine if patient should continue to hold Plavix.    Review of Systems  Objective:     Vital Signs (Most Recent):  Temp: 98.2 °F (36.8 °C) (12/29/24 1127)  Pulse: 73 (12/29/24 1127)  Resp: 17 (12/29/24 1127)  BP: 114/70 (12/29/24 1127)  SpO2: 95 % (12/29/24 1127) Vital Signs (24h Range):  Temp:  [97.7 °F (36.5 °C)-98.8 °F (37.1 °C)] 98.2 °F (36.8 °C)  Pulse:  [65-74] 73  Resp:  [17-18] 17  SpO2:  [95 %-96 %] 95 %  BP: (103-131)/(64-80) 114/70     Weight: 74.2 kg (163 lb 9.3 oz)  Body mass index is 21 kg/m².    Intake/Output Summary (Last 24 hours) at 12/29/2024 1420  Last data filed at 12/29/2024 0557  Gross per 24 hour   Intake 240 ml   Output 800 ml   Net -560 ml         Physical Exam  Constitutional:       General: He is not in acute distress.     Appearance: Normal appearance.      Comments: Frail appearing   HENT:      Head: Normocephalic and atraumatic.      Nose: No congestion or rhinorrhea.      Mouth/Throat:      Mouth: Mucous membranes are moist.   Eyes:      Conjunctiva/sclera: Conjunctivae normal.   Cardiovascular:      Rate and Rhythm: Normal rate and regular rhythm.      Pulses: Normal pulses.      Heart sounds: Normal heart sounds. No murmur heard.  Pulmonary:      Effort: Pulmonary effort is normal.      Breath sounds: Normal breath sounds. No wheezing or rales.   Abdominal:      General: Abdomen is flat. There is no distension.      Tenderness: There is no abdominal tenderness.   Genitourinary:     Comments: Blood tinged urine.     Musculoskeletal:         General: No swelling.      Right lower leg: No edema.      Left lower leg: No edema.   Skin:     General: Skin is warm and dry.      Findings: No rash.   Neurological:      Mental Status: He is alert and oriented to person, place, and time. Mental status is at baseline.       Motor: No weakness.      Comments: Right upper and lower extremity contractures   Psychiatric:         Mood and Affect: Mood normal.         Behavior: Behavior normal.             Significant Labs: All pertinent labs within the past 24 hours have been reviewed.    Significant Imaging: I have reviewed all pertinent imaging results/findings within the past 24 hours.    Assessment and Plan     * Gross hematuria  74 yoM w/ acute onset dysuria, hematuria, and flank pain. CT abd/pelvis with possible blood products in bladder vs mass w/ associated right ureteral fullness. Urology consulted on admission who placed 22 Fr child w/ return of 500cc dark red urine and significant clot burden was irrigated. Of note the patient is on plavix for h/o stroke (2022) and history of cardiac stenting (2020).    Plavix challenged on 12/25 with repeat hematuria. Will hold plavix until outpatient urology evaluation due to ongoing bleeding.His urine appears bloody 12/27, a message was sent via secure chat to Urology, and there is no plan for a scope at this time.    -- Urology consulted; appreciate recs  -- Urology recommending maintaining child until outpatient follow-up for further hematuria workup  -- Holding Plavix; resume after urology follow-up    History of hepatitis C  Noted history. Hep C Ab positive on admission. Unclear if previously treated.    Hep C quant with 13,339,[phone]     -- Will need ambulatory referral to hepatology on discharge    Normocytic anemia  Anemia is likely due to acute blood loss which was from hematuria and Iron deficiency. Most recent hemoglobin and hematocrit are listed below.  Recent Labs     12/27/24  0424 12/28/24  0219 12/29/24  0230   HGB 10.6* 10.3* 10.7*   HCT 33.0* 31.4* 32.6*       Plan  - Monitor serial CBC: Daily  - Transfuse PRBC if patient becomes hemodynamically unstable, symptomatic or H/H drops below 7/21.  - Patient has not received any PRBC transfusions to date  - Patient's anemia is  currently stable    Essential hypertension  Patient's blood pressure range in the last 24 hours was: BP  Min: 108/58  Max: 130/75.The patient's inpatient anti-hypertensive regimen is listed below:  Current Antihypertensives  carvediloL tablet 25 mg, 2 times daily with meals, Oral    Plan  - BP is controlled, no changes needed to their regimen  - Titrate home regimen as indicated    BPH (benign prostatic hyperplasia)  Chronic condition.    -- Continue home Tamsulosin 0.4 mg daily  -- Continue home finasteride 5 mg daily    History of CVA with residual deficit  H/o CVA w/ right-sided weakness and contractures with dysarthria.    -- Holding plavix due to hematuria  -- Continue atorvastatin 40 mg daily  -- Q2h turns    JOSE CARLOS (acute kidney injury)  Unclear if JOSE CARLOS vs CKD on admission. Historically his Cr was 1.86 on 11/8/24 at which time he was admitted at [facility] w/ upper GI bleed and had improved to 1.6 on 11/9/24 with IV fluids. Possibly at his baseline ~ 1.7, but will workup for JOSE CARLOS.    If JOSE CARLOS, then likely due to post-obstructive d/t hematuria w/ clots . Baseline creatinine is unknown. Most recent creatinine and eGFR are listed below.  Recent Labs     12/27/24  0424 12/28/24  0219 12/29/24  0230   CREATININE 1.2 1.3 1.2   EGFRNORACEVR >60.0 58.0* >60.0        Plan  - CT abd/pelvis with right-sided hydronephrosis suspected 2/2 clots since irrigated w/ urology   - Consider follow-up renal US if Cr worsens  - JOSE CARLOS is stable  - Avoid nephrotoxins and renally dose meds for GFR listed above  - Monitor urine output, serial BMP, and adjust therapy as needed      VTE Risk Mitigation (From admission, onward)           Ordered     Reason for No Pharmacological VTE Prophylaxis  Once        Question:  Reasons:  Answer:  Active Bleeding    12/24/24 1919     IP VTE HIGH RISK PATIENT  Once         12/24/24 1919     Place sequential compression device  Until discontinued         12/24/24 1919                    Discharge Planning   PALAK:  12/30/2024     Code Status: Full Code   Medical Readiness for Discharge Date: 12/29/2024  Discharge Plan A: Return to nursing home   Discharge Delays: None known at this time                    Hoang Wylie MD  Department of Hospital Medicine   [facility] - Internal Medicine Telemetry

## 2024-12-29 NOTE — NURSING
Updated pt's niece Rosa Maria of his PALAK being tomorrow 12/30 and allowed her to speak to the pt over speaker phone.

## 2024-12-29 NOTE — PLAN OF CARE
"Nancy spoke with Socorro in nursing at , informed Nancy "its Sunday, Pt will have to return tomorrow when admissions is open." Nancy updated IM team.   "

## 2024-12-30 ENCOUNTER — TELEPHONE (OUTPATIENT)
Dept: HEPATOLOGY | Facility: CLINIC | Age: 74
End: 2024-12-30
Payer: MEDICARE

## 2024-12-30 VITALS
DIASTOLIC BLOOD PRESSURE: 72 MMHG | HEIGHT: 74 IN | RESPIRATION RATE: 18 BRPM | TEMPERATURE: 98 F | HEART RATE: 68 BPM | SYSTOLIC BLOOD PRESSURE: 123 MMHG | WEIGHT: 163.56 LBS | BODY MASS INDEX: 20.99 KG/M2 | OXYGEN SATURATION: 96 %

## 2024-12-30 LAB
ALBUMIN SERPL BCP-MCNC: 2.9 G/DL (ref 3.5–5.2)
ALP SERPL-CCNC: 86 U/L (ref 40–150)
ALT SERPL W/O P-5'-P-CCNC: 36 U/L (ref 10–44)
ANION GAP SERPL CALC-SCNC: 5 MMOL/L (ref 8–16)
ANISOCYTOSIS BLD QL SMEAR: SLIGHT
AST SERPL-CCNC: 34 U/L (ref 10–40)
BASOPHILS # BLD AUTO: 0.04 K/UL (ref 0–0.2)
BASOPHILS NFR BLD: 0.4 % (ref 0–1.9)
BILIRUB SERPL-MCNC: 0.2 MG/DL (ref 0.1–1)
BUN SERPL-MCNC: 24 MG/DL (ref 8–23)
CALCIUM SERPL-MCNC: 8.6 MG/DL (ref 8.7–10.5)
CHLORIDE SERPL-SCNC: 108 MMOL/L (ref 95–110)
CO2 SERPL-SCNC: 22 MMOL/L (ref 23–29)
CREAT SERPL-MCNC: 1.3 MG/DL (ref 0.5–1.4)
DIFFERENTIAL METHOD BLD: ABNORMAL
EOSINOPHIL # BLD AUTO: 0.2 K/UL (ref 0–0.5)
EOSINOPHIL NFR BLD: 2 % (ref 0–8)
ERYTHROCYTE [DISTWIDTH] IN BLOOD BY AUTOMATED COUNT: 13.9 % (ref 11.5–14.5)
EST. GFR  (NO RACE VARIABLE): 58 ML/MIN/1.73 M^2
GLUCOSE SERPL-MCNC: 91 MG/DL (ref 70–110)
HCT VFR BLD AUTO: 33.4 % (ref 40–54)
HGB BLD-MCNC: 10.4 G/DL (ref 14–18)
IMM GRANULOCYTES # BLD AUTO: 0.05 K/UL (ref 0–0.04)
IMM GRANULOCYTES NFR BLD AUTO: 0.5 % (ref 0–0.5)
LYMPHOCYTES # BLD AUTO: 2.7 K/UL (ref 1–4.8)
LYMPHOCYTES NFR BLD: 24.3 % (ref 18–48)
MAGNESIUM SERPL-MCNC: 1.9 MG/DL (ref 1.6–2.6)
MCH RBC QN AUTO: 30.4 PG (ref 27–31)
MCHC RBC AUTO-ENTMCNC: 31.1 G/DL (ref 32–36)
MCV RBC AUTO: 98 FL (ref 82–98)
MONOCYTES # BLD AUTO: 1.3 K/UL (ref 0.3–1)
MONOCYTES NFR BLD: 12.2 % (ref 4–15)
NEUTROPHILS # BLD AUTO: 6.7 K/UL (ref 1.8–7.7)
NEUTROPHILS NFR BLD: 60.6 % (ref 38–73)
NRBC BLD-RTO: 0 /100 WBC
PHOSPHATE SERPL-MCNC: 2.8 MG/DL (ref 2.7–4.5)
PLATELET # BLD AUTO: 129 K/UL (ref 150–450)
PMV BLD AUTO: 12 FL (ref 9.2–12.9)
POIKILOCYTOSIS BLD QL SMEAR: SLIGHT
POTASSIUM SERPL-SCNC: 4.2 MMOL/L (ref 3.5–5.1)
PROT SERPL-MCNC: 7.4 G/DL (ref 6–8.4)
RBC # BLD AUTO: 3.42 M/UL (ref 4.6–6.2)
SODIUM SERPL-SCNC: 135 MMOL/L (ref 136–145)
SPHEROCYTES BLD QL SMEAR: ABNORMAL
WBC # BLD AUTO: 10.97 K/UL (ref 3.9–12.7)

## 2024-12-30 PROCEDURE — 25000003 PHARM REV CODE 250

## 2024-12-30 PROCEDURE — 85025 COMPLETE CBC W/AUTO DIFF WBC: CPT

## 2024-12-30 PROCEDURE — 36415 COLL VENOUS BLD VENIPUNCTURE: CPT

## 2024-12-30 PROCEDURE — 80053 COMPREHEN METABOLIC PANEL: CPT

## 2024-12-30 PROCEDURE — 63600175 PHARM REV CODE 636 W HCPCS: Performed by: HOSPITALIST

## 2024-12-30 PROCEDURE — 84100 ASSAY OF PHOSPHORUS: CPT

## 2024-12-30 PROCEDURE — 83735 ASSAY OF MAGNESIUM: CPT

## 2024-12-30 RX ORDER — POLYETHYLENE GLYCOL 3350 17 G/17G
17 POWDER, FOR SOLUTION ORAL 2 TIMES DAILY PRN
Start: 2024-12-30 | End: 2025-01-14

## 2024-12-30 RX ORDER — MAGNESIUM SULFATE HEPTAHYDRATE 40 MG/ML
2 INJECTION, SOLUTION INTRAVENOUS ONCE
Status: DISCONTINUED | OUTPATIENT
Start: 2024-12-30 | End: 2024-12-30

## 2024-12-30 RX ADMIN — FAMOTIDINE 20 MG: 20 TABLET ORAL at 08:12

## 2024-12-30 RX ADMIN — CEFTRIAXONE 2 G: 2 INJECTION, POWDER, FOR SOLUTION INTRAMUSCULAR; INTRAVENOUS at 11:12

## 2024-12-30 RX ADMIN — CARVEDILOL 25 MG: 12.5 TABLET, FILM COATED ORAL at 04:12

## 2024-12-30 RX ADMIN — MUPIROCIN: 20 OINTMENT TOPICAL at 08:12

## 2024-12-30 RX ADMIN — CARVEDILOL 25 MG: 12.5 TABLET, FILM COATED ORAL at 08:12

## 2024-12-30 RX ADMIN — LEVETIRACETAM 500 MG: 500 TABLET, FILM COATED ORAL at 08:12

## 2024-12-30 RX ADMIN — CITALOPRAM HYDROBROMIDE 20 MG: 20 TABLET ORAL at 08:12

## 2024-12-30 RX ADMIN — TAMSULOSIN HYDROCHLORIDE 0.4 MG: 0.4 CAPSULE ORAL at 08:12

## 2024-12-30 RX ADMIN — FINASTERIDE 5 MG: 5 TABLET, FILM COATED ORAL at 08:12

## 2024-12-30 NOTE — NURSING
At 1520 called report to sallie Colin for transport not yet confirmed. Will DC IV. Pt to go w/Guido. 1730 ambulance came for pt. Face sheet given and pt changed before transfer. Belongings w/patient.

## 2024-12-30 NOTE — PLAN OF CARE
[facility] - Internal Medicine Telemetry  Discharge Reassessment    Primary Care Provider: No, Primary Doctor    Expected Discharge Date: 12/30/2024    Reassessment (most recent)       Discharge Reassessment - 12/30/24 1101          Discharge Reassessment    Assessment Type Discharge Planning Reassessment     Did the patient's condition or plan change since previous assessment? No (P)      Discharge Plan discussed with: -- (P)    Rosa Maria Tineo (Relative/Niece)  [phone]    Communicated PALAK with patient/caregiver Yes (P)      Discharge Plan A Return to nursing home (P)      Discharge Plan B Return to Nursing Home (P)      DME Needed Upon Discharge  none (P)      Transition of Care Barriers None (P)         Post-Acute Status    Post-Acute Authorization Placement (P)      Post-Acute Placement Status Pending post-acute provider review/more information requested (P)      Coverage MEDICARE - MEDICARE PART A & B - (P)                  Discharge Plan A and Plan B have been determined by review of patient's clinical status, future medical and therapeutic needs, and coverage/benefits for post-acute care in coordination with multidisciplinary team members.       ED completed dp reassessment w/ uday Crane. Rosa Maria confirmed plan of re-admit to [facility] today. Rosa Maria requesting to speak w/ MD regarding medical questions; MD notified to f/u w/ family.       No additional post-acute needs identified.              Kelvin Nash, SHANTELL, SW  [facility]  Case Management  Ext. [phone]

## 2024-12-30 NOTE — ASSESSMENT & PLAN NOTE
74 yoM w/ acute onset dysuria, hematuria, and flank pain. CT abd/pelvis with possible blood products in bladder vs mass w/ associated right ureteral fullness. Urology consulted on admission who placed 22 Fr child w/ return of 500cc dark red urine and significant clot burden was irrigated. Of note the patient is on plavix for h/o stroke (2022) and history of cardiac stenting (2020).    Plavix challenged on 12/25 with repeat hematuria. Will hold plavix until outpatient urology evaluation due to ongoing bleeding.His urine appears bloody 12/27, a message was sent via secure chat to Urology, and there is no plan for a scope at this time.    -- Urology consulted; appreciate recs  -- Urology recommending maintaining child for 5-7 days until outpatient follow-up for further hematuria workup and to prevent clot retention.   -- Holding Plavix; resume after urology follow-up

## 2024-12-30 NOTE — ASSESSMENT & PLAN NOTE
Patient's blood pressure range in the last 24 hours was: BP  Min: 115/70  Max: 147/87.The patient's inpatient anti-hypertensive regimen is listed below:  Current Antihypertensives  carvediloL tablet 25 mg, 2 times daily with meals, Oral    Plan  - BP is controlled, no changes needed to their regimen  - Titrate home regimen as indicated

## 2024-12-30 NOTE — DISCHARGE SUMMARY
[facility] - Internal Medicine [facility] Medicine  Discharge Summary      Patient Name: Wu Hampton  MRN: [id number]  [facility]: [id number]  Patient Class: IP- Inpatient  Admission Date: 12/24/2024  Hospital Length of Stay: 5 days  Discharge Date and Time:  12/30/2024 2:53 PM  Attending Physician: Kilo Dangelo, *   Discharging Provider: Daja Feldman DO  Primary Care Provider: Danielle Primary Doctor  [facility] Medicine Team: [facility] MED 3 Daja Feldman DO  Primary Care Team: [facility] MED 3    HPI:   Mr. Wu Hampton Jr. is a 74 y.o. 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 Who presents to [facility] ED today with new-onset hematuria, dysuria, and left flank discomfort, which appear to have begun the day of admission. History is somewhat limited by patient's aphasia. He denies fevers, chills, chest pain, nausea/vomiting.    In the ED, he is hemodynamically stable, labs remarkable for WBC 12.46. Hg 12.1. Cr 1.7 (Baseline ~ 1.6-1.8 in November via Care Everywhere). UA nitrite negative  >100 RBC, 20 WBC and rare bacteria. Post-void residual w/ 300. CT scan performed in the ED shows possible blood products in the bladder and right ureteral fullness. Urology consulted who placed a child and irrigated the bladder with return of clot.    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 Presents with new-onset hematuria, dysuria, and left flank discomfort. CT shows right ureteral fullness, hyperdense products in the right side of the bladder consistent with blood products vs mass. Urology consult and irrigated the bladder with removal of clots. Recommended to maintain child upon discharge x5-7 days, Urology will arrange  outpatient follow up for gross hematuria workup. He was challenged with re-initiation of his plavix prior to discharging but he had recurrent hematuria so his plavix was held. Pt did not experience another episode of hematuria after Plavix was held again. Pt discharged to [facility] with child in place; child will be managed/removed with Urology outpatient.     Goals of Care Treatment Preferences:  Code Status: Full Code       LaPOST: Yes         Physical Exam  Vitals and nursing note reviewed.   Constitutional:       General: He is not in acute distress.     Appearance: Normal appearance.   HENT:      Head: Normocephalic and atraumatic.   Cardiovascular:      Rate and Rhythm: Normal rate and regular rhythm.   Pulmonary:      Effort: Pulmonary effort is normal. No respiratory distress.   Abdominal:      General: Abdomen is flat.      Palpations: Abdomen is soft.   Musculoskeletal:      Right lower leg: No edema.      Left lower leg: No edema.   Skin:     General: Skin is warm and dry.   Neurological:      General: No focal deficit present.      Mental Status: He is alert and oriented to person, place, and time.   Psychiatric:         Mood and Affect: Mood normal.         Behavior: Behavior normal.         SDOH Screening:  The patient was screened for utility difficulties, food insecurity, transport difficulties, housing insecurity, and interpersonal safety and there were no concerns identified this admission.     Consults:   Consults (From admission, onward)          Status Ordering Provider     Inpatient consult to Urology  Once        Provider:  (Not yet assigned)    ANGELITA Camarena            * Gross hematuria  74 yoM w/ acute onset dysuria, hematuria, and flank pain. CT abd/pelvis with possible blood products in bladder vs mass w/ associated right ureteral fullness. Urology consulted on admission who placed 22 Fr child w/ return of 500cc dark red urine and significant clot burden was  irrigated. Of note the patient is on plavix for h/o stroke (2022) and history of cardiac stenting (2020).    Plavix challenged on 12/25 with repeat hematuria. Will hold plavix until outpatient urology evaluation due to ongoing bleeding.His urine appears bloody 12/27, a message was sent via secure chat to Urology, and there is no plan for a scope at this time.    -- Urology consulted; appreciate recs  -- Urology recommending maintaining child for 5-7 days until outpatient follow-up for further hematuria workup and to prevent clot retention.   -- Holding Plavix; resume after urology follow-up    History of hepatitis C  Noted history. Hep C Ab positive on admission. Unclear if previously treated.    Hep C quant with 13,339,569     -- Will need ambulatory referral to hepatology on discharge    Normocytic anemia  Anemia is likely due to acute blood loss which was from hematuria and Iron deficiency. Most recent hemoglobin and hematocrit are listed below.  Recent Labs     12/28/24  0219 12/29/24  0230 12/30/24  0550   HGB 10.3* 10.7* 10.4*   HCT 31.4* 32.6* 33.4*       Plan  - Monitor serial CBC: Daily  - Transfuse PRBC if patient becomes hemodynamically unstable, symptomatic or H/H drops below 7/21.  - Patient has not received any PRBC transfusions to date  - Patient's anemia is currently stable    Essential hypertension  Patient's blood pressure range in the last 24 hours was: BP  Min: 115/70  Max: 147/87.The patient's inpatient anti-hypertensive regimen is listed below:  Current Antihypertensives  carvediloL tablet 25 mg, 2 times daily with meals, Oral    Plan  - BP is controlled, no changes needed to their regimen  - Titrate home regimen as indicated    BPH (benign prostatic hyperplasia)  Chronic condition.    -- Continue home Tamsulosin 0.4 mg daily  -- Continue home finasteride 5 mg daily    History of CVA with residual deficit  H/o CVA w/ right-sided weakness and contractures with dysarthria.    -- Holding plavix  due to hematuria  -- Continue atorvastatin 40 mg daily  -- Q2h turns    JOSE CARLOS (acute kidney injury)  Unclear if JOSE CARLOS vs CKD on admission. Historically his Cr was 1.86 on 11/8/24 at which time he was admitted at [facility] w/ upper GI bleed and had improved to 1.6 on 11/9/24 with IV fluids. Possibly at his baseline ~ 1.7, but will workup for JOSE CARLOS.    If JOSE CARLOS, then likely due to post-obstructive d/t hematuria w/ clots . Baseline creatinine is unknown. Most recent creatinine and eGFR are listed below.  Recent Labs     12/28/24  0219 12/29/24  0230 12/30/24  0550   CREATININE 1.3 1.2 1.3   EGFRNORACEVR 58.0* >60.0 58.0*        Plan  - CT abd/pelvis with right-sided hydronephrosis suspected 2/2 clots since irrigated w/ urology   - Consider follow-up renal US if Cr worsens  - JOSE CARLOS is stable  - Avoid nephrotoxins and renally dose meds for GFR listed above  - Monitor urine output, serial BMP, and adjust therapy as needed      Final Active Diagnoses:    Diagnosis Date Noted POA    PRINCIPAL PROBLEM:  Gross hematuria [R31.0] 12/24/2024 Yes    JOSE CARLOS (acute kidney injury) [N17.9] 12/24/2024 Yes    History of CVA with residual deficit [I69.30] 12/24/2024 Not Applicable    BPH (benign prostatic hyperplasia) [N40.0] 12/24/2024 Yes    Normocytic anemia [D64.9] 04/14/2022 Yes    History of hepatitis C [Z86.19] 04/14/2022 Yes    Essential hypertension [I10] 02/13/2021 Yes      Problems Resolved During this Admission:       Discharged Condition: stable    Disposition: Skilled Nursing Facility    Follow Up:    Patient Instructions:      Ambulatory referral/consult to Urology   Standing Status: Future   Referral Priority: Routine Referral Type: Consultation   Referral Reason: Specialty Services Required   Requested Specialty: Urology   Number of Visits Requested: 1     Ambulatory referral/consult to Hepatology   Standing Status: Future   Referral Priority: Routine Referral Type: Consultation   Referral Reason: Specialty Services Required   Requested  Specialty: Hepatology   Number of Visits Requested: 1       Significant Diagnostic Studies: reviewed    Pending Diagnostic Studies:       None           Medications:  Reconciled Home Medications:      Medication List        START taking these medications      polyethylene glycol 17 gram/dose powder  Commonly known as: GLYCOLAX  Use cap to measure 17 g, mix with liquid, and take by mouth 2 (two) times daily as needed.            CONTINUE taking these medications      ascorbic acid (vitamin C) 500 MG tablet  Commonly known as: VITAMIN C  Take 500 mg by mouth 2 (two) times daily.     atorvastatin 40 MG tablet  Commonly known as: LIPITOR  Take 40 mg by mouth every evening.     carvediloL 25 MG tablet  Commonly known as: COREG  Take 25 mg by mouth 2 (two) times daily with meals.     citalopram 20 MG tablet  Commonly known as: CeleXA  Take 20 mg by mouth once daily.     ferrous sulfate 325 (65 FE) MG EC tablet  Take 325 mg by mouth once daily.     finasteride 5 mg tablet  Commonly known as: PROSCAR  Take 5 mg by mouth once daily.     FLOMAX 0.4 mg Cap  Generic drug: tamsulosin  Take by mouth once daily.     levETIRAcetam 500 MG Tab  Commonly known as: KEPPRA  Take 1 tablet (500 mg total) by mouth 2 (two) times daily.     ONE DAILY MULTIVITAMIN per tablet  Generic drug: multivitamin  Take 1 tablet by mouth once daily.     pantoprazole 40 MG tablet  Commonly known as: PROTONIX  Take 40 mg by mouth once daily.     senna 8.6 mg tablet  Commonly known as: SENOKOT  Take 1 tablet by mouth every evening.     tiZANidine 4 MG tablet  Commonly known as: ZANAFLEX  Take 4 mg by mouth 3 (three) times daily as needed (pain / muscle spasm).            STOP taking these medications      aspirin 81 MG Chew     clopidogreL 75 mg tablet  Commonly known as: PLAVIX     famotidine 20 MG tablet  Commonly known as: PEPCID     FLUoxetine 20 MG capsule     meloxicam 15 MG tablet  Commonly known as: MOBIC              Indwelling Lines/Drains at time  of discharge:   Lines/Drains/Airways       Drain  Duration                  Urethral Catheter 12/24/24 1654 5 days                    Time spent on the discharge of patient: 30 minutes         Daja Feldman DO  Department of Hospital Medicine  [facility] - Internal Medicine Telemetry

## 2024-12-30 NOTE — ASSESSMENT & PLAN NOTE
Anemia is likely due to acute blood loss which was from hematuria and Iron deficiency. Most recent hemoglobin and hematocrit are listed below.  Recent Labs     12/28/24  0219 12/29/24  0230 12/30/24  0550   HGB 10.3* 10.7* 10.4*   HCT 31.4* 32.6* 33.4*       Plan  - Monitor serial CBC: Daily  - Transfuse PRBC if patient becomes hemodynamically unstable, symptomatic or H/H drops below 7/21.  - Patient has not received any PRBC transfusions to date  - Patient's anemia is currently stable

## 2024-12-30 NOTE — PLAN OF CARE
Discharge Plan A and Plan B have been determined by review of patient's clinical status, future medical and therapeutic needs, and coverage/benefits for post-acute care in coordination with multidisciplinary team members.    12/30/24 1057   Post-Acute Status   Post-Acute Authorization Placement   Post-Acute Placement Status Pending post-acute provider review/more information requested   Coverage MEDICARE - MEDICARE PART A & B -   Discharge Plan   Discharge Plan A Return to nursing home   Discharge Plan B Return to Nursing Home     ED phoned [facility] [phone], spoke w/ Vandana (admissions). Apurva confirmed that patient is able to return to NH today pending orders. ED requested that MD place updated orders; SW to send to NH once placed.      11:05am  ED sent NH orders to [facility] via [employer].      3:00pm  ED confirmed w/ Sheryl that patient accepted for return to NH. ED placed [facility] orders for patient transport to [facility]. Rosa Maria Tineo (Relative/niece)  [phone] notified and agreeable to dc plan. Report to be called to [phone] # [phone]. Bedside nurse and MD notified.                  Kelvin Nash, MSW, LMSW  [facility]  Case Management  Ext. [phone]

## 2024-12-30 NOTE — ASSESSMENT & PLAN NOTE
Unclear if JOSE CARLOS vs CKD on admission. Historically his Cr was 1.86 on 11/8/24 at which time he was [place] [facility] w/ upper GI bleed and had improved to 1.6 on 11/9/24 with IV fluids. Possibly at his baseline ~ 1.7, but will workup for JOSE CARLOS.    If JOSE CARLOS, then likely due to post-obstructive d/t hematuria w/ clots . Baseline creatinine is unknown. Most recent creatinine and eGFR are listed below.  Recent Labs     12/28/24  0219 12/29/24  0230 12/30/24  0550   CREATININE 1.3 1.2 1.3   EGFRNORACEVR 58.0* >60.0 58.0*        Plan  - CT abd/pelvis with right-sided hydronephrosis suspected 2/2 clots since irrigated w/ urology   - Consider follow-up renal US if Cr worsens  - JOSE CARLOS is stable  - Avoid nephrotoxins and renally dose meds for GFR listed above  - Monitor urine output, serial BMP, and adjust therapy as needed

## 2024-12-30 NOTE — TELEPHONE ENCOUNTER
Daja Feldman DO ordered that patient be scheduled for a hepatology consult visit for hep c.  Patient quant positive and is currently hospitalized. Per notes he will be returning to nursing home ([facility]).   Appt with PA Scheuermann scheduled 1/27/25; appt reminder notice mailed.

## 2024-12-30 NOTE — PLAN OF CARE
Problem: Skin Injury Risk Increased  Goal: Skin Health and Integrity  Outcome: Progressing     Problem: Infection  Goal: Absence of Infection Signs and Symptoms  Outcome: Progressing     Problem: Adult Inpatient Plan of Care  Goal: Plan of Care Review  Outcome: Progressing  Goal: Patient-Specific Goal (Individualized)  Outcome: Progressing  Goal: Absence of Hospital-Acquired Illness or Injury  Outcome: Progressing  Goal: Optimal Comfort and Wellbeing  Outcome: Progressing  Goal: Readiness for Transition of Care  Outcome: Progressing

## 2024-12-31 NOTE — PLAN OF CARE
[facility] - Internal Medicine Telemetry  Discharge Final Note    Primary Care Provider: No, Primary Doctor    Expected Discharge Date: 12/30/2024    Final Discharge Note (most recent)       Final Note - 12/31/24 0849          Final Note    Assessment Type Final Discharge Note (P)      Anticipated Discharge Disposition [facility] (P)      What phone number can be called within the next 1-3 days to see how you are doing after discharge? [phone] (P)         Post-Acute Status    Post-Acute Authorization Placement (P)      Post-Acute Placement Status Set-up Complete/Auth obtained (P)      Coverage MEDICARE - MEDICARE PART A & B - (P)                    Future Appointments   Date Time Provider Department Center   12/31/2024 11:30 AM Radha Booth NP [facility] UROLOG [facility] [place]   1/27/2025  9:30 AM Scheuermann, Jennifer B., PA [facility] HEPC [facility]        Important Message from Medicare  Important Message from Medicare regarding Discharge Appeal Rights: Other (comments) (CHW called Salome unable to reach)     Date IMM was signed: 12/30/24  Time IMM was signed: 1329      Patient discharged to [facility] via [facility].                Kelvin Nash, SHANTELL, LMSW  [facility]  Case Management  Ext. [phone]

## 2025-01-02 NOTE — PROGRESS NOTES
CHIEF COMPLAINT:  Voiding trial       HISTORY OF PRESENTING ILLINESS:  Wu Hampton is a 74 y.o. male is a new pt to the Urology dept. He presented to the ED on 12/24 for dysuria, hematuria, and flank pain. A CT of the abd/pelvis with possible blood products in bladder vs mass w/ associated right ureteral fullness. Urology consulted on admission who placed 22 Fr child w/ return of 500cc dark red urine and significant clot burden was irrigated. Of note the patient is on plavix for h/o stroke (2022) and history of cardiac stenting (2020).    He is here today for a voiding trail and a gross hematuria workup.     Staying at [facility]. Here with aid.     REVIEW OF SYSTEMS:  Review of Systems   Constitutional:  Negative for chills and fever.   Genitourinary:  Positive for hematuria. Negative for flank pain.        Child removed in clinic         PATIENT HISTORY:    Past Medical History:   Diagnosis Date    BPH (benign prostatic hyperplasia)     Hypercholesterolemia     Stroke        History reviewed. No pertinent surgical history.    No family history on file.    Social History     Socioeconomic History    Marital status: Single   Tobacco Use    Smoking status: Never    Smokeless tobacco: Never   Substance and Sexual Activity    Drug use: Never    Sexual activity: Not Currently     Social Drivers of Health     Financial Resource Strain: Low Risk  (12/26/2024)    Overall Financial Resource Strain (CARDIA)     Difficulty of Paying Living Expenses: Not very hard   Food Insecurity: No Food Insecurity (12/26/2024)    Hunger Vital Sign     Worried About Running Out of Food in the Last Year: Never true     Ran Out of Food in the Last Year: Never true   Transportation Needs: No Transportation Needs (12/26/2024)    TRANSPORTATION NEEDS     Transportation : No   Physical Activity: Inactive (12/26/2024)    Exercise Vital Sign     Days of Exercise per Week: 0 days     Minutes of Exercise per Session: 0 min   Stress: No  Stress Concern Present (12/26/2024)    [place] [employer] - Occupational Stress Questionnaire     Feeling of Stress : Not at all   Housing Stability: Low Risk  (12/26/2024)    Housing Stability Vital Sign     Unable to Pay for Housing in the Last Year: No     Homeless in the Last Year: No       Allergies:  Patient has no known allergies.    Medications:    Current Outpatient Medications:     ascorbic acid, vitamin C, (VITAMIN C) 500 MG tablet, Take 500 mg by mouth 2 (two) times daily., Disp: , Rfl:     atorvastatin (LIPITOR) 40 MG tablet, Take 40 mg by mouth every evening., Disp: , Rfl:     carvediloL (COREG) 25 MG tablet, Take 25 mg by mouth 2 (two) times daily with meals., Disp: , Rfl:     citalopram (CELEXA) 20 MG tablet, Take 20 mg by mouth once daily., Disp: , Rfl:     ferrous sulfate 325 (65 FE) MG EC tablet, Take 325 mg by mouth once daily., Disp: , Rfl:     finasteride (PROSCAR) 5 mg tablet, Take 5 mg by mouth once daily., Disp: , Rfl:     levETIRAcetam (KEPPRA) 500 MG Tab, Take 1 tablet (500 mg total) by mouth 2 (two) times daily., Disp: 60 tablet, Rfl: 11    multivitamin (ONE DAILY MULTIVITAMIN) per tablet, Take 1 tablet by mouth once daily., Disp: , Rfl:     pantoprazole (PROTONIX) 40 MG tablet, Take 40 mg by mouth once daily., Disp: , Rfl:     polyethylene glycol (GLYCOLAX) 17 gram/dose powder, Use cap to measure 17 g, mix with liquid, and take by mouth 2 (two) times daily as needed., Disp: , Rfl:     senna (SENOKOT) 8.6 mg tablet, Take 1 tablet by mouth every evening., Disp: , Rfl:     tamsulosin (FLOMAX) 0.4 mg Cap, Take by mouth once daily., Disp: , Rfl:     tiZANidine (ZANAFLEX) 4 MG tablet, Take 4 mg by mouth 3 (three) times daily as needed (pain / muscle spasm)., Disp: , Rfl:     PHYSICAL EXAMINATION:  Physical Exam  Constitutional:       Appearance: Normal appearance. He is well-developed.   Genitourinary:     Penis: Uncircumcised.    Neurological:      Mental Status: He is  "alert.   Psychiatric:         Behavior: Behavior is cooperative.           LABS:          No results found for: "PSA", "PSADIAG", "PSATOTAL", "PHIND"    Lab Results   Component Value Date    CREATININE 1.3 2024    EGFRNORACEVR 58.0 (A) 2024               IMPRESSION:    Encounter Diagnoses   Name Primary?    Gross hematuria          Assessment:       1. Gross hematuria        Plan:         -RTC in 2 weeks for a PVR, urine culture, cytology at that time. CTU ordered today. Schedule cysto when he comes back.     I explained to the patient that the causes of hematuria, whether it be gross hematuria or microhematuria, are many, including but not limited to  infections, kidney stones,  malignancy. Fortunately, for patients with microhematuria, the likelihood of finding an underlying  malignancy as the cause of the hematuria is very low at 1-2%. In patients with gross hematuria, the chances of an underlying  malignancy are higher but still low at 15-20%.      Nevertheless, I explained to the patient that the evaluation in both cases consists of upper tract imaging followed by flexible cystoscopy. I described the rationale and procedure for both and answered all questions.    Hematuria work up discussed in detail.  Will proceed with hematuria work up:  Creatinine prior to CT urogram to ensure adequate kidney function.   Cysto  CT urogram   Urine cytology  Urine culture       - Name, , allergies verified. Procedure explained, questions answered. Shantrice instilled  ml in the bladder until the patient verbalized discomfort. Child catheter balloon was then deflated and catheter was removed without issue. Patient was able to urinate 0 ml urine without difficulty. Instructed to drink 6-8 oz water/hr until urology clinic calls the patient at 1300 to check on urination status. Patient verbalized understanding. If unable to urinate, pt will need to RTC by 1500 so that PVR can be obtained with possible child " catheter placement.

## 2025-01-03 ENCOUNTER — OFFICE VISIT (OUTPATIENT)
Dept: UROLOGY | Facility: CLINIC | Age: 75
End: 2025-01-03
Payer: MEDICARE

## 2025-01-03 ENCOUNTER — TELEPHONE (OUTPATIENT)
Dept: UROLOGY | Facility: CLINIC | Age: 75
End: 2025-01-03
Payer: MEDICARE

## 2025-01-03 VITALS
SYSTOLIC BLOOD PRESSURE: 115 MMHG | HEART RATE: 74 BPM | DIASTOLIC BLOOD PRESSURE: 68 MMHG | BODY MASS INDEX: 20.99 KG/M2 | WEIGHT: 163.56 LBS | HEIGHT: 74 IN

## 2025-01-03 DIAGNOSIS — N17.9 AKI (ACUTE KIDNEY INJURY): Primary | ICD-10-CM

## 2025-01-03 DIAGNOSIS — R31.0 GROSS HEMATURIA: ICD-10-CM

## 2025-01-03 PROCEDURE — 99214 OFFICE O/P EST MOD 30 MIN: CPT | Mod: PBBFAC

## 2025-01-03 PROCEDURE — 99999 PR PBB SHADOW E&M-EST. PATIENT-LVL IV: CPT | Mod: PBBFAC,,,

## 2025-01-03 NOTE — TELEPHONE ENCOUNTER
----- Message from [employer]och[employer] sent at 1/3/2025 10:36 AM CST -----  Regarding: Reshedule  Contact: [phone]  Type:  Needs Medical Advice    Who Called: Chema calling from [facility]  Reschedule CT scheduled for the 1/16 @ 3:45 will not be work  Resident are not out that late  Would the patient rather a call back or a response via [facility]? Call back  Best Call Back Number: [phone]  Additional Information: Will confirm with  if the WB is an option

## 2025-01-03 NOTE — TELEPHONE ENCOUNTER
Spoke with the Nursing home to see if the [facility]n was able to goto the restroom phone line disconnected after being trasnfered to twio diffrent people.

## 2025-01-16 ENCOUNTER — HOSPITAL ENCOUNTER (OUTPATIENT)
Dept: RADIOLOGY | Facility: HOSPITAL | Age: 75
Discharge: HOME | End: 2025-01-16
Payer: MEDICARE

## 2025-01-16 DIAGNOSIS — R31.0 GROSS HEMATURIA: ICD-10-CM

## 2025-01-16 PROCEDURE — 74178 CT ABD&PLV WO CNTR FLWD CNTR: CPT | Mod: TC

## 2025-01-16 PROCEDURE — 25500020 PHARM REV CODE 255

## 2025-01-16 PROCEDURE — 74178 CT ABD&PLV WO CNTR FLWD CNTR: CPT | Mod: 26,,, | Performed by: RADIOLOGY

## 2025-01-16 RX ADMIN — IOHEXOL 125 ML: 350 INJECTION, SOLUTION INTRAVENOUS at 09:01

## 2025-01-17 ENCOUNTER — OFFICE VISIT (OUTPATIENT)
Dept: UROLOGY | Facility: CLINIC | Age: 75
End: 2025-01-17
Payer: MEDICARE

## 2025-01-17 VITALS
HEIGHT: 74 IN | WEIGHT: 163.56 LBS | SYSTOLIC BLOOD PRESSURE: 100 MMHG | DIASTOLIC BLOOD PRESSURE: 65 MMHG | BODY MASS INDEX: 20.99 KG/M2 | HEART RATE: 65 BPM

## 2025-01-17 DIAGNOSIS — N40.0 BENIGN PROSTATIC HYPERPLASIA, UNSPECIFIED WHETHER LOWER URINARY TRACT SYMPTOMS PRESENT: Primary | ICD-10-CM

## 2025-01-17 DIAGNOSIS — R31.0 GROSS HEMATURIA: ICD-10-CM

## 2025-01-17 LAB
BILIRUB SERPL-MCNC: NORMAL MG/DL
BLOOD URINE, POC: NORMAL
CLARITY, POC UA: CLEAR
COLOR, POC UA: YELLOW
GLUCOSE UR QL STRIP: NORMAL
KETONES UR QL STRIP: NORMAL
LEUKOCYTE ESTERASE URINE, POC: NORMAL
NITRITE, POC UA: NORMAL
PH, POC UA: 6.5
POC RESIDUAL URINE VOLUME: 104 ML (ref 0–100)
PROTEIN, POC: NORMAL
SPECIFIC GRAVITY, POC UA: 1.01
UROBILINOGEN, POC UA: 0.2

## 2025-01-17 PROCEDURE — 99214 OFFICE O/P EST MOD 30 MIN: CPT | Mod: S$PBB,,,

## 2025-01-17 PROCEDURE — 99999PBSHW POCT BLADDER SCAN: Mod: PBBFAC,,,

## 2025-01-17 PROCEDURE — 51798 US URINE CAPACITY MEASURE: CPT | Mod: PBBFAC

## 2025-01-17 PROCEDURE — 87088 URINE BACTERIA CULTURE: CPT

## 2025-01-17 PROCEDURE — 87086 URINE CULTURE/COLONY COUNT: CPT

## 2025-01-17 PROCEDURE — 99999 PR PBB SHADOW E&M-EST. PATIENT-LVL III: CPT | Mod: PBBFAC,,,

## 2025-01-17 PROCEDURE — 99999PBSHW POCT URINE DIPSTICK WITHOUT MICROSCOPE: Mod: PBBFAC,,,

## 2025-01-17 PROCEDURE — 87077 CULTURE AEROBIC IDENTIFY: CPT

## 2025-01-17 PROCEDURE — 99213 OFFICE O/P EST LOW 20 MIN: CPT | Mod: PBBFAC

## 2025-01-17 PROCEDURE — 88112 CYTOPATH CELL ENHANCE TECH: CPT | Performed by: PATHOLOGY

## 2025-01-17 PROCEDURE — 99999PBSHW PR PBB SHADOW TECHNICAL ONLY FILED TO HB: Mod: PBBFAC,,,

## 2025-01-17 PROCEDURE — 81002 URINALYSIS NONAUTO W/O SCOPE: CPT | Mod: PBBFAC

## 2025-01-17 PROCEDURE — 88112 CYTOPATH CELL ENHANCE TECH: CPT | Mod: 26,,, | Performed by: PATHOLOGY

## 2025-01-17 PROCEDURE — 87186 SC STD MICRODIL/AGAR DIL: CPT | Mod: 59

## 2025-01-17 NOTE — PROGRESS NOTES
CHIEF COMPLAINT:  PVR       HISTORY OF PRESENTING ILLINESS:  Wu Hampton is a 74 y.o. male is a new pt to the Urology dept. He presented to the ED on 12/24 for dysuria, hematuria, and flank pain. A CT of the abd/pelvis with possible blood products in bladder vs mass w/ associated right ureteral fullness. Urology consulted on admission who placed 22 Fr child w/ return of 500cc dark red urine and significant clot burden was irrigated. Of note the patient is on plavix for h/o stroke (2022) and history of cardiac stenting (2020).    He is here today for a voiding trail and a gross hematuria workup.     Staying at [facility]. Here with aid.     1/16/25- He is here today for a PVR and GH orders. He denies any issues or complaints during todays visit.    REVIEW OF SYSTEMS:  Review of Systems   Constitutional:  Negative for chills and fever.   Genitourinary:  Negative for flank pain, frequency, hematuria and urgency.         PATIENT HISTORY:    Past Medical History:   Diagnosis Date    BPH (benign prostatic hyperplasia)     Hypercholesterolemia     Stroke        History reviewed. No pertinent surgical history.    No family history on file.    Social History     Socioeconomic History    Marital status: Single   Tobacco Use    Smoking status: Never    Smokeless tobacco: Never   Substance and Sexual Activity    Drug use: Never    Sexual activity: Not Currently     Social Drivers of Health     Financial Resource Strain: Low Risk  (12/26/2024)    Overall Financial Resource Strain (CARDIA)     Difficulty of Paying Living Expenses: Not very hard   Food Insecurity: No Food Insecurity (12/26/2024)    Hunger Vital Sign     Worried About Running Out of Food in the Last Year: Never true     Ran Out of Food in the Last Year: Never true   Transportation Needs: No Transportation Needs (12/26/2024)    TRANSPORTATION NEEDS     Transportation : No   Physical Activity: Inactive (12/26/2024)    Exercise Vital Sign     Days of  Exercise per Week: 0 days     Minutes of Exercise per Session: 0 min   Stress: No Stress Concern Present (12/26/2024)    [employer] [place] [employer] - Occupational Stress Questionnaire     Feeling of Stress : Not at all   Housing Stability: Low Risk  (12/26/2024)    Housing Stability Vital Sign     Unable to Pay for Housing in the Last Year: No     Homeless in the Last Year: No       Allergies:  Patient has no known allergies.    Medications:    Current Outpatient Medications:     ascorbic acid, vitamin C, (VITAMIN C) 500 MG tablet, Take 500 mg by mouth 2 (two) times daily., Disp: , Rfl:     atorvastatin (LIPITOR) 40 MG tablet, Take 40 mg by mouth every evening., Disp: , Rfl:     carvediloL (COREG) 25 MG tablet, Take 25 mg by mouth 2 (two) times daily with meals., Disp: , Rfl:     citalopram (CELEXA) 20 MG tablet, Take 20 mg by mouth once daily., Disp: , Rfl:     ferrous sulfate 325 (65 FE) MG EC tablet, Take 325 mg by mouth once daily., Disp: , Rfl:     finasteride (PROSCAR) 5 mg tablet, Take 5 mg by mouth once daily., Disp: , Rfl:     levETIRAcetam (KEPPRA) 500 MG Tab, Take 1 tablet (500 mg total) by mouth 2 (two) times daily., Disp: 60 tablet, Rfl: 11    multivitamin (ONE DAILY MULTIVITAMIN) per tablet, Take 1 tablet by mouth once daily., Disp: , Rfl:     pantoprazole (PROTONIX) 40 MG tablet, Take 40 mg by mouth once daily., Disp: , Rfl:     senna (SENOKOT) 8.6 mg tablet, Take 1 tablet by mouth every evening., Disp: , Rfl:     tamsulosin (FLOMAX) 0.4 mg Cap, Take by mouth once daily., Disp: , Rfl:     tiZANidine (ZANAFLEX) 4 MG tablet, Take 4 mg by mouth 3 (three) times daily as needed (pain / muscle spasm)., Disp: , Rfl:     PHYSICAL EXAMINATION:  Physical Exam  Constitutional:       Appearance: Normal appearance. He is well-developed.   HENT:      Head: Normocephalic and atraumatic.   Eyes:      Pupils: Pupils are equal, round, and reactive to light.   Pulmonary:      Effort: Pulmonary effort is  "normal. No respiratory distress.   Genitourinary:     Penis: Normal and uncircumcised.       Testes: Normal.   Skin:     General: Skin is warm and dry.      Capillary Refill: Capillary refill takes less than 2 seconds.   Neurological:      General: No focal deficit present.      Mental Status: He is alert.   Psychiatric:         Mood and Affect: Mood normal.         Behavior: Behavior normal. Behavior is cooperative.           LABS:    Pt wearing a diaper which he voids in. A bladder scan was done which showed 104 ml. Pt cathed for a urine sample to send off for cytology      No results found for: "PSA", "PSADIAG", "PSATOTAL", "PHIND"    Lab Results   Component Value Date    CREATININE 1.3 12/30/2024    EGFRNORACEVR 58.0 (A) 12/30/2024               IMPRESSION:    Encounter Diagnoses   Name Primary?    Benign prostatic hyperplasia, unspecified whether lower urinary tract symptoms present Yes    Gross hematuria            Assessment:       1. Benign prostatic hyperplasia, unspecified whether lower urinary tract symptoms present    2. 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   -Cysto with Dr. Deleon on 1/30/25 for GH workup    I explained to the patient that the causes of hematuria, whether it be gross hematuria or microhematuria, are many, including but not limited to  infections, kidney stones,  malignancy. Fortunately, for patients with microhematuria, the likelihood of finding an underlying  malignancy as the cause of the hematuria is very low at 1-2%. In patients with gross hematuria, the chances of an underlying  malignancy are higher but still low at 15-20%.      Nevertheless, I explained to the patient that the evaluation in both cases consists of upper tract imaging followed by flexible cystoscopy. I described the rationale and procedure for both and answered all " questions.    Hematuria work up discussed in detail.  Will proceed with hematuria work up:  Creatinine prior to CT urogram to ensure adequate kidney function.   Cysto  CT urogram   Urine cytology  Urine culture         I spent a total of 30 minutes on the day of the visit. This includes face to face time and non-face to face time preparing to see the patient (eg, review of tests), obtaining and/or reviewing separately obtained history, documenting clinical information in the electronic or other health record, independently interpreting results and communicating results to the patient/family/caregiver, or care coordinator  Reviewed the possible contributory factors.              used

## 2025-01-21 ENCOUNTER — TELEPHONE (OUTPATIENT)
Dept: UROLOGY | Facility: CLINIC | Age: 75
End: 2025-01-21
Payer: MEDICARE

## 2025-01-21 DIAGNOSIS — N39.0 URINARY TRACT INFECTION WITH HEMATURIA, SITE UNSPECIFIED: Primary | ICD-10-CM

## 2025-01-21 DIAGNOSIS — R31.9 URINARY TRACT INFECTION WITH HEMATURIA, SITE UNSPECIFIED: Primary | ICD-10-CM

## 2025-01-21 LAB
BACTERIA UR CULT: ABNORMAL
BACTERIA UR CULT: ABNORMAL

## 2025-01-21 RX ORDER — NITROFURANTOIN 25; 75 MG/1; MG/1
100 CAPSULE ORAL 2 TIMES DAILY
Qty: 10 CAPSULE | Refills: 0 | Status: SHIPPED | OUTPATIENT
Start: 2025-01-21 | End: 2025-01-26

## 2025-01-21 NOTE — TELEPHONE ENCOUNTER
Called Nursing home staff to inform them pt has a UTI which needs to be treated. Antibiotics were sent to pharmacy of choice. Instructed to keep upcoming appt on 1/30 for cysto with Dr Deleon for hematuria workup.

## 2025-01-24 LAB
FINAL PATHOLOGIC DIAGNOSIS: NORMAL
Lab: NORMAL

## 2025-01-27 ENCOUNTER — OFFICE VISIT (OUTPATIENT)
Dept: HEPATOLOGY | Facility: CLINIC | Age: 75
End: 2025-01-27
Payer: MEDICARE

## 2025-01-27 ENCOUNTER — LAB VISIT (OUTPATIENT)
Dept: LAB | Facility: HOSPITAL | Age: 75
End: 2025-01-27
Payer: MEDICARE

## 2025-01-27 VITALS — BODY MASS INDEX: 21.23 KG/M2 | WEIGHT: 165.38 LBS | HEIGHT: 74 IN

## 2025-01-27 DIAGNOSIS — B18.2 CHRONIC HEPATITIS C WITHOUT HEPATIC COMA: Primary | ICD-10-CM

## 2025-01-27 DIAGNOSIS — Z86.19 HISTORY OF HEPATITIS C: ICD-10-CM

## 2025-01-27 DIAGNOSIS — B18.2 CHRONIC HEPATITIS C WITHOUT HEPATIC COMA: ICD-10-CM

## 2025-01-27 LAB
HAV IGG SER QL IA: REACTIVE
HBV CORE AB SERPL QL IA: REACTIVE
HBV SURFACE AB SER-ACNC: 200.81 MIU/ML
HBV SURFACE AB SER-ACNC: REACTIVE M[IU]/ML
HBV SURFACE AG SERPL QL IA: NORMAL

## 2025-01-27 PROCEDURE — 36415 COLL VENOUS BLD VENIPUNCTURE: CPT | Performed by: PHYSICIAN ASSISTANT

## 2025-01-27 PROCEDURE — 86790 VIRUS ANTIBODY NOS: CPT | Performed by: PHYSICIAN ASSISTANT

## 2025-01-27 PROCEDURE — 86704 HEP B CORE ANTIBODY TOTAL: CPT | Performed by: PHYSICIAN ASSISTANT

## 2025-01-27 PROCEDURE — 99213 OFFICE O/P EST LOW 20 MIN: CPT | Mod: PBBFAC | Performed by: PHYSICIAN ASSISTANT

## 2025-01-27 PROCEDURE — 86706 HEP B SURFACE ANTIBODY: CPT | Performed by: PHYSICIAN ASSISTANT

## 2025-01-27 PROCEDURE — 99999 PR PBB SHADOW E&M-EST. PATIENT-LVL III: CPT | Mod: PBBFAC,,, | Performed by: PHYSICIAN ASSISTANT

## 2025-01-27 PROCEDURE — 99203 OFFICE O/P NEW LOW 30 MIN: CPT | Mod: S$PBB,,, | Performed by: PHYSICIAN ASSISTANT

## 2025-01-27 PROCEDURE — 87340 HEPATITIS B SURFACE AG IA: CPT | Performed by: PHYSICIAN ASSISTANT

## 2025-01-27 PROCEDURE — 87902 NFCT AGT GNTYP ALYS HEP C: CPT | Performed by: PHYSICIAN ASSISTANT

## 2025-01-27 RX ORDER — VELPATASVIR AND SOFOSBUVIR 100; 400 MG/1; MG/1
1 TABLET, FILM COATED ORAL DAILY
Qty: 28 TABLET | Refills: 2 | Status: ACTIVE | OUTPATIENT
Start: 2025-01-27

## 2025-01-27 NOTE — PROGRESS NOTES
HEPATOLOGY CLINIC VISIT NOTE - HCV clinic  REFERRING PROVIDER: Daja Feldman DO  CHIEF COMPLAINT: Hepatitis C   (accompanied by: staff from [facility])    HISTORY       This is a 74 y.o. Black or  male with chronic hepatitis C, here for further eval / mngmt. PMH significant for CVA w/ right-sided deficits and aphasia (2022); he resides at [facility].     History somewhat limited but he denies prior liver disease  Per CE: hospitalized 11/2024 for concern for GI bleed. Ultimately EGD not done b/c Hgb remained ~ 12, CARISSA neg for blood, was on Fe. Sent home w/ protonix BID. Current med list indicates dosing of Protonix 40mg QD.    HCV history:  Recently diagnosed while inpt for hematuria  Prior icteric illnesses: None  - Prior Treatment: No   - Genotype ?  - HCV RNA >13 mill IU/mL - 12/2024    Liver staging:  No formal liver staging  No liver imaging  Labs - no evidence of liver dysfunction but recent plt 110s-140s    FIB-4: 3.21 (12/30/2024): considered high-risk for advanced fibrosis    No jaundice, abdominal distention, hematemesis, melena  No abnormal skin rashes. No generalized joint pain.     PMH, PSH, SOCIAL HX, FAMILY HX      Reviewed in Epic  Pertinent findings:  FAMILY HX: neg for liver diease  SOCIAL HX: resident at [facility]  Alcohol - no  Drugs - no      ROS: as per HPI    PHYSICAL EXAM:  Friendly Black or  male, in no acute distress; alert and oriented to person, place and time  HEENT: Sclerae anicteric.   NECK: Supple  LUNGS: Normal respiratory effort.   ABDOMEN: Flat, soft, nontender.  SKIN: Warm and dry. No jaundice, No obvious rashes.   EXTREMITIES: No lower extremity edema. (+) tattoos  NEURO/PSYCH: Seated in wheelchair. Memory intact. Thought and speech pattern appropriate. Behavior normal. No depression or anxiety noted.    PERTINENT DIAGNOSTIC RESULTS      Lab Results   Component Value Date    WBC 10.97 12/30/2024    HGB 10.4 (L)  12/30/2024     (L) 12/30/2024     Lab Results   Component Value Date    INR 1.0 12/25/2024     Lab Results   Component Value Date    AST 34 12/30/2024    ALT 36 12/30/2024    BILITOT 0.2 12/30/2024    ALBUMIN 2.9 (L) 12/30/2024    ALKPHOS 86 12/30/2024    CREATININE 1.3 12/30/2024    BUN 24 (H) 12/30/2024     (L) 12/30/2024    K 4.2 12/30/2024     CT ABDOMEN PELVIS WITHOUT CONTRAST - 12/24/24   CLINICAL HISTORY:left flank pain;   TECHNIQUE:Low dose axial images, sagittal and coronal reformations were obtained from the lung bases to the pubic symphysis.   COMPARISON:None     FINDINGS:  This examination is limited due to lack of intravenous contrast.   Lower chest: Atelectasis and/or scar at the visualized right greater left lung bases.  Right lower lobe bronchial wall thickening.  Question trace right pleural effusion.  Superimposed infectious and/or noninfectious inflammatory airspace consolidation at the right lower lobe difficult to exclude.     Liver: Normal contour.   Gallbladder and bile ducts: Unremarkable.   Pancreas: Normal contour.   Spleen: Normal contour.   Adrenals: Normal contour.   Kidneys: Nonspecific bilateral perinephric stranding.  No definite obstructing radiopaque urolithiasis.  Minimal asymmetrically prominent caliber of the right ureter.   Lymph nodes: No abdominal or pelvic lymphadenopathy.   Bowel and mesentery: Small hiatal hernia.  No evidence of bowel obstruction.  Moderate volume colonic stool.  Colonic diverticulosis.  Normal appendix.   Abdominal aorta: Nonaneurysmal.  Moderate atherosclerotic calcification.   Inferior vena cava: Unremarkable.   Free fluid or free air: None.     Pelvis: Unremarkable.   Urinary bladder: Hyperattenuating material dependently within the right aspect of the urinary bladder would be in keeping with blood products in light of reported history of hematuria.  Underlying soft tissue mass not excluded by current technique.     Body wall:  Unremarkable.     Bones: No definite acute abnormality.  Degenerative findings of the spine and hips.  Suggested moderate to severe central spinal canal stenosis at L4-5 and L5-S1.  Foraminal stenosis is severe bilaterally at L5-S1 and on the right at L4-5.     Impression:  1. Dependent hyperattenuating material within the urinary bladder eccentric to the right would be in keeping with blood products in light of reported history hematuria.  Underlying soft tissue mass not excluded.  Recommend follow-up with urology.  2. Minimal asymmetric prominent caliber of the right ureter, possibly related to obstruction at the level of the ureterovesical junction due to the above-described blood products.  Sequela of a recently passed stone difficult to entirely exclude.  3. Additional details of chronic and incidental findings, as provided in the body of the report.    ASSESSMENT      74 y.o. Black or  male with:  1. Chronic HCV, genotype  ?  - treatment naive  -- Elevated transaminases  -- HAV status - Unknown  -- HBV status - Unknown    2. Prior CVA, now w/ aphasia and right sided hemiplegia    3. Concern for GI bleed in 11/2024 but ultimately EGD not done.  -- now on protonix daily    PLAN        Labs today  FibroScan soon  Epclusa x 12 weeks sent to [facility]  -- Patient to notify me of Rx start date so labs can be scheduled.  While on Epclusa: Reduce protonix to 20mg once daily, dosed 4hrs after Epclusa    Orders Placed This Encounter   Procedures    FibroScan Transplant Hepatology(Vibration Controlled Transient Elastography)    Hepatitis B Surface Antigen    Hepatitis B Surface Ab, Qualitative    Hepatitis B Core Antibody, Total    Hepatitis A antibody, IgG    HCV Fibrosure    Hepatitis C Genotype     ______________________________________________________________________________  EDUCATION:  HCV RX  Discussed goal of HCV eradication to prevent progression of liver disease.  Discussed use  of Epclusa daily x 12 weeks w/ potential side effects of fatigue and headache.     Reviewed limitations on acid suppressant medications due to DDI w/ Epclusa:  -- Antacids - not taking, but must be  from Epclusa by 4 hours if needed  -- H2 Receptor Antagonist - not taking  -- PPI - limited to protonix 20mg once daily, dosed 4 hours after Epclusa is taken with food  Patient instructed to contact me if experiencing acid related symptoms so further recommendations can be made regarding acid suppression therapy.      Reviewed limitations on statin therapy and Epclusa:  -- Patient taking atorvastatin 40mg once daily. Can continue but dose can not be increased.    Herbal / alternative therapies must be discontinued  Discussed importance of medication adherence and risk of treatment failure / viral resistance if not adherent. Pt has verbalized understanding.    __________________________________________________________________    Duration of encounter: 30 min  This includes face-to-face time and non face-to-face time preparing to see the patient (eg, review of tests), obtaining and/or reviewing separately obtained history, documenting clinical information in the electronic or other health record, independently interpreting resultsand communicating results to the patient/family/caregiver, or care coordination.

## 2025-01-28 ENCOUNTER — TELEPHONE (OUTPATIENT)
Facility: CLINIC | Age: 75
End: 2025-01-28
Payer: MEDICARE

## 2025-01-28 NOTE — TELEPHONE ENCOUNTER
----- Message from Jennifer Scheuermann, PA sent at 1/27/2025 11:36 AM CST -----  Pls send today's visit note to [facility]  ( [phone])

## 2025-01-29 ENCOUNTER — TELEPHONE (OUTPATIENT)
Dept: UROLOGY | Facility: CLINIC | Age: 75
End: 2025-01-29
Payer: MEDICARE

## 2025-01-29 NOTE — TELEPHONE ENCOUNTER
Spoke with [facility] re: confirming appt for tomorrow.   The nurse stated that the pt is a total asst & needs a [place] lift to transfer.   Verified with them that the appt could be moved up too 10AM.   They called EMS to verify.

## 2025-01-30 ENCOUNTER — PROCEDURE VISIT (OUTPATIENT)
Dept: UROLOGY | Facility: CLINIC | Age: 75
End: 2025-01-30
Payer: MEDICARE

## 2025-01-30 VITALS
DIASTOLIC BLOOD PRESSURE: 70 MMHG | BODY MASS INDEX: 21.23 KG/M2 | RESPIRATION RATE: 18 BRPM | HEIGHT: 74 IN | TEMPERATURE: 97 F | SYSTOLIC BLOOD PRESSURE: 94 MMHG | HEART RATE: 85 BPM

## 2025-01-30 DIAGNOSIS — R31.0 GROSS HEMATURIA: ICD-10-CM

## 2025-01-30 DIAGNOSIS — I69.30 HISTORY OF CVA WITH RESIDUAL DEFICIT: Primary | ICD-10-CM

## 2025-01-30 LAB
A2 MACROGLOB SERPL-MCNC: 262 MG/DL (ref 100–280)
ALT SERPL W P-5'-P-CCNC: 63 U/L (ref 7–55)
APO A-I SERPL-MCNC: 76 MG/DL
BILIRUB SERPL-MCNC: 0.5 MG/DL (ref 0–1.2)
BIOPREDICTIVE SERIAL NUMBER: ABNORMAL
FIBROSIS STAGE SERPL QL: ABNORMAL
FIBROTEST INTERPRETATION: ABNORMAL
FIBROTEST-ACTITEST COMMENT: ABNORMAL
GGT SERPL-CCNC: 84 U/L (ref 8–61)
HAPTOGLOB SERPL-MCNC: 296 MG/DL (ref 30–200)
LIVER FIBR SCORE SERPL CALC.FIBROSURE: 0.67
NECROINFLAMMAT INTERP: ABNORMAL
NECROINFLAMMATORY ACT GRADE SERPL QL: ABNORMAL
NECROINFLAMMATORY ACT SCORE SERPL: 0.51

## 2025-01-30 PROCEDURE — 87086 URINE CULTURE/COLONY COUNT: CPT | Performed by: UROLOGY

## 2025-01-30 PROCEDURE — 52000 CYSTOURETHROSCOPY: CPT | Mod: S$PBB,,, | Performed by: UROLOGY

## 2025-01-30 PROCEDURE — 87186 SC STD MICRODIL/AGAR DIL: CPT | Performed by: UROLOGY

## 2025-01-30 PROCEDURE — 52000 CYSTOURETHROSCOPY: CPT | Mod: PBBFAC | Performed by: UROLOGY

## 2025-01-30 PROCEDURE — 87088 URINE BACTERIA CULTURE: CPT | Performed by: UROLOGY

## 2025-01-30 RX ORDER — CIPROFLOXACIN 500 MG/1
500 TABLET ORAL
Status: COMPLETED | OUTPATIENT
Start: 2025-01-30 | End: 2025-01-30

## 2025-01-30 RX ORDER — LIDOCAINE HYDROCHLORIDE 20 MG/ML
JELLY TOPICAL ONCE
Status: COMPLETED | OUTPATIENT
Start: 2025-01-30 | End: 2025-01-30

## 2025-01-30 RX ADMIN — LIDOCAINE HYDROCHLORIDE 5 ML: 20 JELLY TOPICAL at 10:01

## 2025-01-30 RX ADMIN — CIPROFLOXACIN 500 MG: 500 TABLET ORAL at 10:01

## 2025-01-30 NOTE — LETTER
January 30, 2025        Radha Booth, OLVIN  [address]  [facility] [address]             [facility] - Urology  [address]  [facility] [address]  Phone: [phone]   Patient: Wu Hampton   MR Number: [id number]   YOB: 1950   Date of Visit: 1/30/2025       Dear Dr. Booth:    Thank you for referring Wu Hampton to me for evaluation. Below are the relevant portions of my assessment and plan of care.            If you have questions, please do not hesitate to call me. I look forward to following Wu along with you.    Sincerely,      Iain Deleon MD           CC  No Recipients

## 2025-01-30 NOTE — PROCEDURES
Cystoscopy    Date/Time: 1/30/2025 10:00 AM    Performed by: Iain Deleon MD  Authorized by: Radha Booth NP    Comments:      Procedure Date:  01/30/2025      Procedure:  Male Diagnostic Cystourethroscopy    Pre-op diagnosis: UTI, hematuria, BPH, s/p stroke with paralysis, bed-ridden  Post-op diagnosis: same, incomplete bladder emptying  Anesthesia: Local  Surgeon:  Iain Deleon MD    Findings:  Urethra:  Normal urethra.   Sphincter: competent.  Prostate: Estimated Length Prostatic Urethra: 4 cm with minimal obstruction  Bladder neck: patent with no stricture  Bladder:  Normal bladder.  Large amount of debris, incomplete bladder emptying of 120 ml,  bladder irrigated in order to visualize the bladder better during cysto  No mass or bladder lesion seen.  Normal ureteral orifices bilaterally.     Description of Procedure:                                                         Informed Consent:                                                            - Risks, benefits and alternatives of procedure discussed with               patient and informed consent obtained.       Patient Position:   - Supine. --- Bladder ---   Prep and Drape:   - Patient prepped and draped in usual sterile fashion using povidone     iodine (Betadine).   Instruments:   - 16 Fr flexible cystoscope with 0 degree lens.   Procedure Details:   - Cystoscope passed under vision into bladder.   - Bladder and urethra examined in their entirety with findings as     above.     Conclusion:  1. Incomplete bladder emptying in pt with stoke and bed-ridden  May consider CIC if needed, but given his condition and support, CIC may not be practical.    2. BPH  Has been on flomax and finasteride  Continue medical therapy  Urine culture sent.  Will treat him with abx once the culture result available.    Plan:  Patient was discharged home in a stable condition.  Medications: cipro   Follow up:  as needed.    · History of CVA with residual deficit   Urine  Culture High Risk   ciprofloxacin HCl tablet 500 mg    · Gross hematuria   Cystoscopy   LIDOcaine HCl 2% urojet   Urine Culture High Risk   ciprofloxacin HCl tablet 500 mg

## 2025-01-30 NOTE — PATIENT INSTRUCTIONS
What to Expect After a Cystoscopy  For the next 24-48 hours, you may feel a mild burning when you urinate. This burning is normal and expected. Drink plenty of water to dilute the urine to help relieve the burning sensation. You may also see a small amount of blood in your urine after the procedure.    Unless you are already taking antibiotics, you may be given an antibiotic after the test to prevent infection.    Signs and Symptoms to Report  Call the [facility] at [phone] if you develop any of the following:  Fever of 101 degrees or higher  Chills or persistent bleeding  Inability to urinate

## 2025-02-01 LAB — BACTERIA UR CULT: ABNORMAL

## 2025-02-02 LAB
HCV RNA SERPL NAA+PROBE-ACNC: ABNORMAL IU/ML
HCV RNA SERPL NAA+PROBE-LOG IU: 7.29 LOGIU/ML

## 2025-02-03 ENCOUNTER — TELEPHONE (OUTPATIENT)
Dept: UROLOGY | Facility: CLINIC | Age: 75
End: 2025-02-03
Payer: MEDICARE

## 2025-02-03 ENCOUNTER — PROCEDURE VISIT (OUTPATIENT)
Dept: HEPATOLOGY | Facility: CLINIC | Age: 75
End: 2025-02-03
Payer: MEDICARE

## 2025-02-03 DIAGNOSIS — B18.2 CHRONIC HEPATITIS C WITHOUT HEPATIC COMA: ICD-10-CM

## 2025-02-03 DIAGNOSIS — N30.90 CYSTITIS: Primary | ICD-10-CM

## 2025-02-03 PROCEDURE — 91200 LIVER ELASTOGRAPHY: CPT | Mod: 26,S$PBB,, | Performed by: PHYSICIAN ASSISTANT

## 2025-02-03 PROCEDURE — 91200 LIVER ELASTOGRAPHY: CPT | Mod: PBBFAC | Performed by: PHYSICIAN ASSISTANT

## 2025-02-03 RX ORDER — CIPROFLOXACIN 500 MG/1
500 TABLET ORAL 2 TIMES DAILY
Qty: 14 TABLET | Refills: 0 | Status: SHIPPED | OUTPATIENT
Start: 2025-02-03 | End: 2025-02-10

## 2025-02-03 NOTE — TELEPHONE ENCOUNTER
Cystitis  -     ciprofloxacin HCl (CIPRO) 500 MG tablet; Take 1 tablet (500 mg total) by mouth 2 (two) times daily. for 14 doses  Dispense: 14 tablet; Refill: 0     He can take cipro for positive urine culture.  S/p stroke and he is bed ridden.

## 2025-02-04 ENCOUNTER — TELEPHONE (OUTPATIENT)
Dept: UROLOGY | Facility: CLINIC | Age: 75
End: 2025-02-04
Payer: MEDICARE

## 2025-02-04 NOTE — TELEPHONE ENCOUNTER
Pt stays at [facility], spoke to his nurse, she verified that they received the cipro yesterday and started it for his UTI        Iain Deleon MD Sosa, Kayla R, NP; Tanja Prabhakar LPN         Attached Notes    Telephone Encounter by Iain Deleon MD at 2/3/2025  4:19 PM    Author: Iain Deleon MD Service: -- Author Type: Physician   Filed: 2/3/2025  4:22 PM Encounter Date: 2/3/2025 Note Type: Telephone Encounter   Status: Signed : Iain Deleon MD (Physician)   Cystitis  -     ciprofloxacin HCl (CIPRO) 500 MG tablet; Take 1 tablet (500 mg total) by mouth 2 (two) times daily. for 14 doses  Dispense: 14 tablet; Refill: 0      He can take cipro for positive urine culture.  S/p stroke and he is bed ridden.         Routing History

## 2025-02-05 LAB
HCV GENTYP SERPL NAA+PROBE: ABNORMAL
HCV RNA SERPL NAA+PROBE-ACNC: ABNORMAL IU/ML
HCV RNA SERPL NAA+PROBE-LOG IU: 7.29 LOGIU/ML

## 2025-02-06 NOTE — PROCEDURES
FibroScan Transplant Hepatology(Vibration Controlled Transient Elastography)    Date/Time: 2/3/2025 10:00 AM    Performed by: Scheuermann, Jennifer B., PA  Authorized by: Scheuermann, Jennifer B., PA    Diagnosis:  HCV    Probe:  M    Universal Protocol: Patient's identity, procedure and site were verified, confirmatory pause was performed.  Discussed procedure including risks and potential complications.  Questions answered.  Patient verbalizes understanding and wishes to proceed with VCTE.     Procedure: After providing explanations of the procedure, patient was placed in the supine position with right arm in maximum abduction to allow optimal exposure of right lateral abdomen.  Patient was briefly assessed, Testing was performed in the mid-axillary location, 50Hz Shear Wave pulses were applied and the resulting Shear Wave and Propagation Speed detected with a 3.5 MHz ultrasonic signal, using the FibroScan probe, Skin to liver capsule distance and liver parenchyma were accessed during the entire examination with the FibroScan probe, Patient was instructed to breathe normally and to abstain from sudden movements during the procedure, allowing for random measurements of liver stiffness. At least 10 Shear Waves were produced, Individual measurements of each Shear Wave were calculated.  Patient tolerated the procedure well with no complications.  Meets discharge criteria as was dismissed.  Rates pain 0 out of 10.  Patient will follow up with ordering provider to review results.    Findings  Median liver stiffness score:  6.3  CAP Reading: dB/m:  192    IQR/med %:  5  Interpretation  Fibrosis interpretation is based on medial liver stiffness - Kilopascal (kPa).    Fibrosis Stage:  F 0-1  Steatosis interpretation is based on controlled attenuation parameter - (dB/m).    Steatosis Grade:  <S1

## 2025-02-12 ENCOUNTER — HOSPITAL ENCOUNTER (EMERGENCY)
Facility: HOSPITAL | Age: 75
Discharge: HOME | End: 2025-02-12
Attending: EMERGENCY MEDICINE
Payer: MEDICARE

## 2025-02-12 VITALS
OXYGEN SATURATION: 100 % | DIASTOLIC BLOOD PRESSURE: 67 MMHG | SYSTOLIC BLOOD PRESSURE: 119 MMHG | TEMPERATURE: 98 F | HEART RATE: 64 BPM | RESPIRATION RATE: 16 BRPM
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
## 2025-02-12 PROCEDURE — 99284 EMERGENCY DEPT VISIT MOD MDM: CPT | Mod: 25

## 2025-02-12 NOTE — ED NOTES
Called [facility] for report. Phone rang,  answered, and this writer remained on hold for 6 minutes and will call back.

## 2025-02-12 NOTE — ED NOTES
Wu Hampton, a 74 y.o. male presents to the ED w/ complaint of fall at NH. Patient has a PMH of CVA with right sided residual deficits. Patient does not contribute to meaningful conversations and is unable to verbalize events leading up to ED visit.     Triage note:  Chief Complaint   Patient presents with    Fall     Unwitnessed fall at NH - presents awake with no evidence of trauma. Patient unable to contribute to history s/t aphasia from CVA.      Review of patient's allergies indicates:  No Known Allergies  Past Medical History:   Diagnosis Date    BPH (benign prostatic hyperplasia)     Chronic hepatitis C     HLD (hyperlipidemia)     Hypercholesterolemia     Stroke

## 2025-02-12 NOTE — DISCHARGE INSTRUCTIONS
Patient's CT scan of the head and C-spine were negative.      His vital signs were stable throughout his ED stay.      I do not appreciate any acute abnormality on physical exam merit eating additional workup at this time.      He appears to be his baseline per EMS report.      He had no change in mental status or other physical complaints throughout his ED stay.      He is stable to be returned back to his nursing home at this time.

## 2025-02-12 NOTE — ED PROVIDER NOTES
Encounter Date: 2/12/2025       History     Chief Complaint   Patient presents with    Fall     Unwitnessed fall at NH - presents awake with no evidence of trauma. Patient unable to contribute to history s/t aphasia from CVA.      Patient is a 74-year-old male with a history of CVA with residual right-sided deficit, hyperlipidemia, seizure disorder who presents to the ED via EMS with complaint of fall.  Patient reportedly had an unwitnessed fall at his nursing home.  EMS reports the patient is essentially nonverbal and grunts when communicating.  He is unable to provide additional collateral regarding his presentation today.  No findings of current antiplatelet or anticoagulant use on chart check in this patient with a history of CVA.      Review of patient's allergies indicates:  No Known Allergies  Past Medical History:   Diagnosis Date    BPH (benign prostatic hyperplasia)     Chronic hepatitis C     HLD (hyperlipidemia)     Hypercholesterolemia     Stroke      No past surgical history on file.  No family history on file.  Social History     Tobacco Use    Smoking status: Never    Smokeless tobacco: Never   Substance Use Topics    Drug use: Never     Review of Systems   Unable to perform ROS: Mental status change       Physical Exam     Initial Vitals [02/12/25 1158]   BP Pulse Resp Temp SpO2   119/67 64 16 98 °F (36.7 °C) 100 %      MAP       --         Physical Exam    Constitutional: He appears well-developed and well-nourished.   HENT:   Head:       Well healed scar to the R forehead  Questionable hematoma to the R occiput  No other overt signs of head trauma    Eyes: EOM are normal. Pupils are equal, round, and reactive to light.   Neck: Neck supple.   Normal range of motion; no midline tenderness   Normal range of motion.  Pulmonary/Chest: Breath sounds normal.   Abdominal: Abdomen is soft.   Musculoskeletal:         General: No tenderness or edema. Normal range of motion.      Cervical back: Normal, normal  range of motion and neck supple. No bony tenderness.      Thoracic back: Normal. No bony tenderness.      Lumbar back: Normal. No bony tenderness.      Right hip: No deformity.      Left hip: Deformity present.      Comments: The right upper and right lower extremities are contracted which is presumably secondary to patient's previous CVA    Complete and thorough palpation of entire spinal column without obvious deformity, bony tenderness or step offs.        Neurological: He is alert.   Alert but able to assess whether he knows person place or time secondary to communication deficit which is his baseline strength appears to be normal on the left side of his body; there is a residual weakness of the right and lower extremities   Skin: Skin is warm. No rash noted. No erythema.         ED Course   Procedures  Labs Reviewed - No data to display       Imaging Results              CT Cervical Spine Without Contrast (Final result)  Result time 02/12/25 13:41:27      Final result by Lukasz Estrada DO (02/12/25 13:41:27)                   Impression:      CT head: Moderate to large region of encephalomalacia left cerebral hemisphere compatible with remote infarcts.  In addition remote cerebellar infarcts bilaterally.    No evidence for acute intracranial hemorrhage or sulcal effacement to suggest large territory recent infarction.    CT cervical spine:    Spondylo degenerative change cervical spine without evidence for acute fracture.    Further evaluation as warranted clinically.      Electronically signed by: Lukasz Estrada DO  Date:    02/12/2025  Time:    13:41               Narrative:    EXAMINATION:  CT HEAD WITHOUT CONTRAST; CT CERVICAL SPINE WITHOUT CONTRAST    CLINICAL HISTORY:  Head trauma, minor (Age >= 65y);; Neck trauma (Age >= 65y);    TECHNIQUE:  CT head: Multiple sequential 5 mm axial images of the head without contrast.  Coronal and sagittal reformatted imaging from the axial acquisition.    CT cervical  spine: Multiple sequential 1.25 mm axial images of the cervical spine without contrast.  Coronal and sagittal reformatted imaging from the axial acquisition.    COMPARISON:  CT head and cervical spine 08/30/2024    FINDINGS:  CT head: Moderate to large encephalomalacia left parietooccipital lobe and large component in the left MCA territory compatible with remote infarcts.  Compensatory enlargement ventricles sulci and cisterns without hydrocephalus.  In addition there is moderate to large left cerebellar and small right cerebellar remote infarcts with encephalomalacia    Cavum septum pellucidum developmental variant identified.  Superimposed generalized cerebral volume loss similar    No significant opacification visualized paranasal sinuses or mastoid air cells.    CT cervical spine: Cervical sagittal alignment similar to prior slightly straightened.  There is trace grade 1 retrolisthesis of C 3 on C4 through C5 on C6.  There is degenerative disc disease with intervertebral height loss and endplate degeneration at all levels.  Allowing for degenerative changes there is no evidence for acute fracture or traumatic subluxation cervical spine.  No consolidation visualized lung apices    Evaluation of the central canal neural foramen distorted by artifact from motion and beam hardening allowing for this degenerative change most pronounced at C3/C4 with posterior disc osteophyte with uncovertebral joint hypertrophy and facet arthropathy mild moderate central canal stenosis with moderate to severe bilateral bony neural foraminal stenosis                                       CT Head Without Contrast (Final result)  Result time 02/12/25 13:41:27      Final result by Lukasz Estrada DO (02/12/25 13:41:27)                   Impression:      CT head: Moderate to large region of encephalomalacia left cerebral hemisphere compatible with remote infarcts.  In addition remote cerebellar infarcts bilaterally.    No evidence for  acute intracranial hemorrhage or sulcal effacement to suggest large territory recent infarction.    CT cervical spine:    Spondylo degenerative change cervical spine without evidence for acute fracture.    Further evaluation as warranted clinically.      Electronically signed by: Lukasz Estrada DO  Date:    02/12/2025  Time:    13:41               Narrative:    EXAMINATION:  CT HEAD WITHOUT CONTRAST; CT CERVICAL SPINE WITHOUT CONTRAST    CLINICAL HISTORY:  Head trauma, minor (Age >= 65y);; Neck trauma (Age >= 65y);    TECHNIQUE:  CT head: Multiple sequential 5 mm axial images of the head without contrast.  Coronal and sagittal reformatted imaging from the axial acquisition.    CT cervical spine: Multiple sequential 1.25 mm axial images of the cervical spine without contrast.  Coronal and sagittal reformatted imaging from the axial acquisition.    COMPARISON:  CT head and cervical spine 08/30/2024    FINDINGS:  CT head: Moderate to large encephalomalacia left parietooccipital lobe and large component in the left MCA territory compatible with remote infarcts.  Compensatory enlargement ventricles sulci and cisterns without hydrocephalus.  In addition there is moderate to large left cerebellar and small right cerebellar remote infarcts with encephalomalacia    Cavum septum pellucidum developmental variant identified.  Superimposed generalized cerebral volume loss similar    No significant opacification visualized paranasal sinuses or mastoid air cells.    CT cervical spine: Cervical sagittal alignment similar to prior slightly straightened.  There is trace grade 1 retrolisthesis of C 3 on C4 through C5 on C6.  There is degenerative disc disease with intervertebral height loss and endplate degeneration at all levels.  Allowing for degenerative changes there is no evidence for acute fracture or traumatic subluxation cervical spine.  No consolidation visualized lung apices    Evaluation of the central canal neural foramen  distorted by artifact from motion and beam hardening allowing for this degenerative change most pronounced at C3/C4 with posterior disc osteophyte with uncovertebral joint hypertrophy and facet arthropathy mild moderate central canal stenosis with moderate to severe bilateral bony neural foraminal stenosis                                       Medications - No data to display  Medical Decision Making  Amount and/or Complexity of Data Reviewed  Radiology: ordered. Decision-making details documented in ED Course.               ED Course as of 02/12/25 1357   Wed Feb 12, 2025   1348 CT Head Without Contrast  CT head: Moderate to large region of encephalomalacia left cerebral hemisphere compatible with remote infarcts.  In addition remote cerebellar infarcts bilaterally.     No evidence for acute intracranial hemorrhage or sulcal effacement to suggest large territory recent infarction per final radiology read.    [LC]   1348 CT Cervical Spine Without Contrast  Spondylo degenerative change cervical spine without evidence for acute fracture per final radiology read.  [LC]      ED Course User Index  [LC] Matthew Comer MD               Medical Decision Making:   Initial Assessment:   See HPI   ED Management:  - VSS; pt afebrile; NAD  - No overt signs of trauma other than possible questionable posterior scalp hematoma   - CT head WO negative for acute intracranial abnormality per final radiology read   - CT C spine WO negative for acute abnormality per final radiology read   - no other findings of trauma or complaints or concerns from NH and/or EMS to merit additional testing  - pt appears to be at baseline mental function in the context of reported unwitnessed fall  - will discharge back to NH at this time              Clinical Impression:  Final diagnoses:  [W19.XXXA] Fall, initial encounter (Primary)          ED Disposition Condition    Discharge Stable          ED Prescriptions    None       Follow-up Information     None          Matthew Comer MD  02/12/25 [id number]

## 2025-02-12 NOTE — ED NOTES
Called [facility] again, to notify of report. [facility] now here to pick patient up. This writer was on hold for 5 minutes. Report to Kallie. Notified her patient CT was negative and discharge paperwork to be sent to NH with patient. Expressed understanding.

## 2025-02-19 ENCOUNTER — TELEPHONE (OUTPATIENT)
Dept: HEPATOLOGY | Facility: CLINIC | Age: 75
End: 2025-02-19
Payer: MEDICARE

## 2025-02-19 DIAGNOSIS — B18.2 CHRONIC HEPATITIS C WITHOUT HEPATIC COMA: Primary | ICD-10-CM

## 2025-02-20 NOTE — TELEPHONE ENCOUNTER
Pt beginning 12 weeks Epclusa on 2/6/25  Anticipated treatment end date: 4/30/25  F 0-1  Chelsea 1A  Prior HCV treatment: No      Pls update episode and schedule:  - LFT, HCV RNA at week 6 -   - LFT, HCV RNA - SVR12 - 7/30/25

## 2025-02-20 NOTE — TELEPHONE ENCOUNTER
Msg from PA Scheuermann and lab orders dated 3/19/25 and 7/30/25 faxed to [facility] for patient to complete blood draws there.

## 2025-02-20 NOTE — TELEPHONE ENCOUNTER
----- Message from ASHLEY Estrada sent at 2/19/2025  8:51 AM CST -----  Unsure if msg was forwarded to you.  Epclusa start 2/6/25. Please provide lab orders.

## 2025-03-31 ENCOUNTER — TELEPHONE (OUTPATIENT)
Dept: HEPATOLOGY | Facility: CLINIC | Age: 75
End: 2025-03-31
Payer: MEDICARE

## 2025-03-31 DIAGNOSIS — B18.2 CHRONIC HEPATITIS C WITHOUT HEPATIC COMA: Primary | ICD-10-CM

## 2025-03-31 NOTE — TELEPHONE ENCOUNTER
Pt began 12 weeks Epclusa on 2/6/25  Anticipated treatment end date: 4/30/25  F 0-1  Chelsea 1A  Prior HCV treatment: No  (+) HBcAb      3/18/25  HCV 23  LFT stable but AST/ALT bumped up             Pls call pt / nursing home  Continue epclusa, 1 pill each day  Needs HBV DNA now, pls schedule    Next scheduled labs  - LFT, HCV RNA - SVR12 - 7/30/25

## 2025-04-02 NOTE — TELEPHONE ENCOUNTER
I faxed PA Scheuermann's msg to [facility] on yesterday.  I spoke with Nurse Socorro.  She states that hep b quant was drawn on 4/1/25 and she will send results once obtained.

## 2025-05-03 ENCOUNTER — HOSPITAL ENCOUNTER (INPATIENT)
Facility: HOSPITAL | Age: 75
LOS: 3 days | Discharge: HOME | DRG: 690 | End: 2025-05-07
Attending: EMERGENCY MEDICINE | Admitting: INTERNAL MEDICINE
Payer: MEDICARE

## 2025-05-03 DIAGNOSIS — R07.9 CHEST PAIN: ICD-10-CM

## 2025-05-03 DIAGNOSIS — N30.01 ACUTE CYSTITIS WITH HEMATURIA: ICD-10-CM

## 2025-05-03 DIAGNOSIS — Z78.9 NURSING HOME RESIDENT: Chronic | ICD-10-CM

## 2025-05-03 DIAGNOSIS — R10.9 ABDOMINAL PAIN: ICD-10-CM

## 2025-05-03 DIAGNOSIS — N12 PYELITIS: Primary | ICD-10-CM

## 2025-05-03 LAB — LIPASE SERPL-CCNC: 23 U/L (ref 4–60)

## 2025-05-03 PROCEDURE — 99285 EMERGENCY DEPT VISIT HI MDM: CPT | Mod: 25

## 2025-05-03 PROCEDURE — 85025 COMPLETE CBC W/AUTO DIFF WBC: CPT

## 2025-05-03 PROCEDURE — 96375 TX/PRO/DX INJ NEW DRUG ADDON: CPT

## 2025-05-03 PROCEDURE — 93010 ELECTROCARDIOGRAM REPORT: CPT | Mod: ,,, | Performed by: INTERNAL MEDICINE

## 2025-05-03 PROCEDURE — 84484 ASSAY OF TROPONIN QUANT: CPT | Performed by: EMERGENCY MEDICINE

## 2025-05-03 PROCEDURE — 63600175 PHARM REV CODE 636 W HCPCS

## 2025-05-03 PROCEDURE — 81001 URINALYSIS AUTO W/SCOPE: CPT

## 2025-05-03 PROCEDURE — 83690 ASSAY OF LIPASE: CPT

## 2025-05-03 PROCEDURE — 96374 THER/PROPH/DIAG INJ IV PUSH: CPT

## 2025-05-03 PROCEDURE — 87086 URINE CULTURE/COLONY COUNT: CPT

## 2025-05-03 PROCEDURE — 93005 ELECTROCARDIOGRAM TRACING: CPT

## 2025-05-03 PROCEDURE — 80053 COMPREHEN METABOLIC PANEL: CPT | Performed by: EMERGENCY MEDICINE

## 2025-05-03 RX ORDER — MORPHINE SULFATE 4 MG/ML
4 INJECTION, SOLUTION INTRAMUSCULAR; INTRAVENOUS
Refills: 0 | Status: COMPLETED | OUTPATIENT
Start: 2025-05-03 | End: 2025-05-03

## 2025-05-03 RX ADMIN — MORPHINE SULFATE 4 MG: 4 INJECTION INTRAVENOUS at 11:05

## 2025-05-03 NOTE — Clinical Note
Diagnosis: Abdominal pain [625098]   Future Attending Provider: UMA JASSO [[id number]]   Is the patient being sent to ED Observation?: No

## 2025-05-04 PROBLEM — N12 PYELONEPHRITIS OF RIGHT KIDNEY: Status: ACTIVE | Noted: 2025-05-04

## 2025-05-04 PROBLEM — K86.9 PANCREATIC LESION: Status: ACTIVE | Noted: 2025-05-04

## 2025-05-04 PROBLEM — K21.9 GASTROESOPHAGEAL REFLUX DISEASE WITHOUT ESOPHAGITIS: Status: ACTIVE | Noted: 2025-05-04

## 2025-05-04 PROBLEM — G40.909 SEIZURE DISORDER: Status: ACTIVE | Noted: 2025-05-04

## 2025-05-04 PROBLEM — M51.369 DEGENERATION OF LUMBAR INTERVERTEBRAL DISC: Status: ACTIVE | Noted: 2025-05-04

## 2025-05-04 PROBLEM — I70.1 RIGHT RENAL ARTERY STENOSIS: Status: ACTIVE | Noted: 2025-05-04

## 2025-05-04 PROBLEM — N30.00 ACUTE CYSTITIS: Status: ACTIVE | Noted: 2025-05-04

## 2025-05-04 PROBLEM — I25.10 CORONARY ARTERY DISEASE INVOLVING NATIVE CORONARY ARTERY OF NATIVE HEART WITHOUT ANGINA PECTORIS: Status: ACTIVE | Noted: 2025-05-04

## 2025-05-04 PROBLEM — M1A.9XX0 CHRONIC GOUT WITHOUT TOPHUS: Status: ACTIVE | Noted: 2025-05-04

## 2025-05-04 LAB
ABSOLUTE EOSINOPHIL (OHS): 0.17 K/UL
ABSOLUTE MONOCYTE (OHS): 1.14 K/UL (ref 0.3–1)
ABSOLUTE NEUTROPHIL COUNT (OHS): 5.82 K/UL (ref 1.8–7.7)
ALBUMIN SERPL BCP-MCNC: 3.3 G/DL (ref 3.5–5.2)
ALP SERPL-CCNC: 86 UNIT/L (ref 40–150)
ALT SERPL W/O P-5'-P-CCNC: 71 UNIT/L (ref 10–44)
ANION GAP (OHS): 8 MMOL/L (ref 8–16)
AST SERPL-CCNC: 48 UNIT/L (ref 11–45)
BACTERIA #/AREA URNS AUTO: ABNORMAL /HPF
BASOPHILS # BLD AUTO: 0.09 K/UL
BASOPHILS NFR BLD AUTO: 0.8 %
BILIRUB SERPL-MCNC: 0.4 MG/DL (ref 0.1–1)
BILIRUB UR QL STRIP.AUTO: NEGATIVE
BIPAP: 0
BUN SERPL-MCNC: 28 MG/DL (ref 8–23)
CALCIUM SERPL-MCNC: 8.6 MG/DL (ref 8.7–10.5)
CHLORIDE SERPL-SCNC: 108 MMOL/L (ref 95–110)
CLARITY UR: ABNORMAL
CO2 SERPL-SCNC: 20 MMOL/L (ref 23–29)
COLOR UR AUTO: ABNORMAL
CREAT SERPL-MCNC: 1.4 MG/DL (ref 0.5–1.4)
ERYTHROCYTE [DISTWIDTH] IN BLOOD BY AUTOMATED COUNT: 14 % (ref 11.5–14.5)
FIO2: 21 %
GFR SERPLBLD CREATININE-BSD FMLA CKD-EPI: 53 ML/MIN/1.73/M2
GLUCOSE SERPL-MCNC: 83 MG/DL (ref 70–110)
GLUCOSE UR QL STRIP: NEGATIVE
HCT VFR BLD AUTO: 38.7 % (ref 40–54)
HGB BLD-MCNC: 12.9 GM/DL (ref 14–18)
HGB UR QL STRIP: ABNORMAL
HOLD SPECIMEN: NORMAL
IMM GRANULOCYTES # BLD AUTO: 0.08 K/UL (ref 0–0.04)
IMM GRANULOCYTES NFR BLD AUTO: 0.7 % (ref 0–0.5)
KETONES UR QL STRIP: NEGATIVE
LDH SERPL L TO P-CCNC: 0.9 MMOL/L
LEUKOCYTE ESTERASE UR QL STRIP: ABNORMAL
LYMPHOCYTES # BLD AUTO: 3.45 K/UL (ref 1–4.8)
MCH RBC QN AUTO: 31.2 PG (ref 27–31)
MCHC RBC AUTO-ENTMCNC: 33.3 G/DL (ref 32–36)
MCV RBC AUTO: 94 FL (ref 82–98)
MICROSCOPIC COMMENT: ABNORMAL
NITRITE UR QL STRIP: NEGATIVE
NON-SQ EPI CELLS #/AREA URNS AUTO: 7 /HPF
NUCLEATED RBC (/100WBC) (OHS): 0 /100 WBC
OHS QRS DURATION: 100 MS
OHS QTC CALCULATION: 442 MS
PH UR STRIP: 6 [PH]
PLATELET # BLD AUTO: 129 K/UL (ref 150–450)
PMV BLD AUTO: 12.7 FL (ref 9.2–12.9)
POC PERFORMED BY: NORMAL
POC TEMPERATURE: 37 C
POTASSIUM SERPL-SCNC: 4 MMOL/L (ref 3.5–5.1)
PROT SERPL-MCNC: 7.6 GM/DL (ref 6–8.4)
PROT UR QL STRIP: ABNORMAL
RBC # BLD AUTO: 4.14 M/UL (ref 4.6–6.2)
RBC #/AREA URNS AUTO: 31 /HPF (ref 0–4)
RELATIVE EOSINOPHIL (OHS): 1.6 %
RELATIVE LYMPHOCYTE (OHS): 32.1 % (ref 18–48)
RELATIVE MONOCYTE (OHS): 10.6 % (ref 4–15)
RELATIVE NEUTROPHIL (OHS): 54.2 % (ref 38–73)
SODIUM SERPL-SCNC: 136 MMOL/L (ref 136–145)
SP GR UR STRIP: 1.01
SPECIMEN SOURCE: NORMAL
TROPONIN I SERPL HS-MCNC: 6 NG/L
UROBILINOGEN UR STRIP-ACNC: NEGATIVE EU/DL
WBC # BLD AUTO: 10.75 K/UL (ref 3.9–12.7)
WBC #/AREA URNS AUTO: >100 /HPF (ref 0–5)
WBC CLUMPS UR QL AUTO: ABNORMAL

## 2025-05-04 PROCEDURE — 25500020 PHARM REV CODE 255: Performed by: EMERGENCY MEDICINE

## 2025-05-04 PROCEDURE — 63600175 PHARM REV CODE 636 W HCPCS

## 2025-05-04 PROCEDURE — 96374 THER/PROPH/DIAG INJ IV PUSH: CPT

## 2025-05-04 PROCEDURE — 21400001 HC TELEMETRY ROOM

## 2025-05-04 PROCEDURE — 25000003 PHARM REV CODE 250

## 2025-05-04 PROCEDURE — 96372 THER/PROPH/DIAG INJ SC/IM: CPT

## 2025-05-04 PROCEDURE — 99900035 HC TECH TIME PER 15 MIN (STAT)

## 2025-05-04 PROCEDURE — 27000207 HC ISOLATION

## 2025-05-04 PROCEDURE — 94761 N-INVAS EAR/PLS OXIMETRY MLT: CPT

## 2025-05-04 PROCEDURE — 87040 BLOOD CULTURE FOR BACTERIA: CPT

## 2025-05-04 PROCEDURE — 83605 ASSAY OF LACTIC ACID: CPT

## 2025-05-04 RX ORDER — SODIUM CHLORIDE 0.9 % (FLUSH) 0.9 %
5 SYRINGE (ML) INJECTION
Status: DISCONTINUED | OUTPATIENT
Start: 2025-05-04 | End: 2025-05-07 | Stop reason: HOSPADM

## 2025-05-04 RX ORDER — MELOXICAM 15 MG/1
15 TABLET ORAL DAILY
Status: DISCONTINUED | OUTPATIENT
Start: 2025-05-04 | End: 2025-05-07 | Stop reason: HOSPADM

## 2025-05-04 RX ORDER — NALOXONE HCL 0.4 MG/ML
0.02 VIAL (ML) INJECTION
Status: DISCONTINUED | OUTPATIENT
Start: 2025-05-04 | End: 2025-05-07 | Stop reason: HOSPADM

## 2025-05-04 RX ORDER — ALUMINUM HYDROXIDE, MAGNESIUM HYDROXIDE, AND SIMETHICONE 1200; 120; 1200 MG/30ML; MG/30ML; MG/30ML
30 SUSPENSION ORAL 4 TIMES DAILY PRN
Status: DISCONTINUED | OUTPATIENT
Start: 2025-05-04 | End: 2025-05-07 | Stop reason: HOSPADM

## 2025-05-04 RX ORDER — SIMETHICONE 80 MG
1 TABLET,CHEWABLE ORAL 4 TIMES DAILY PRN
Status: DISCONTINUED | OUTPATIENT
Start: 2025-05-04 | End: 2025-05-07 | Stop reason: HOSPADM

## 2025-05-04 RX ORDER — LEVETIRACETAM 500 MG/1
500 TABLET ORAL 2 TIMES DAILY
Status: DISCONTINUED | OUTPATIENT
Start: 2025-05-04 | End: 2025-05-07 | Stop reason: HOSPADM

## 2025-05-04 RX ORDER — POLYETHYLENE GLYCOL 3350 17 G/17G
17 POWDER, FOR SOLUTION ORAL EVERY 12 HOURS PRN
COMMUNITY

## 2025-05-04 RX ORDER — LANOLIN ALCOHOL/MO/W.PET/CERES
1 CREAM (GRAM) TOPICAL DAILY
Status: DISCONTINUED | OUTPATIENT
Start: 2025-05-04 | End: 2025-05-07 | Stop reason: HOSPADM

## 2025-05-04 RX ORDER — IPRATROPIUM BROMIDE AND ALBUTEROL SULFATE 2.5; .5 MG/3ML; MG/3ML
3 SOLUTION RESPIRATORY (INHALATION) EVERY 4 HOURS PRN
Status: DISCONTINUED | OUTPATIENT
Start: 2025-05-04 | End: 2025-05-07 | Stop reason: HOSPADM

## 2025-05-04 RX ORDER — POLYETHYLENE GLYCOL 3350 17 G/17G
17 POWDER, FOR SOLUTION ORAL 2 TIMES DAILY PRN
Status: DISCONTINUED | OUTPATIENT
Start: 2025-05-04 | End: 2025-05-07 | Stop reason: HOSPADM

## 2025-05-04 RX ORDER — CIPROFLOXACIN 2 MG/ML
400 INJECTION, SOLUTION INTRAVENOUS
Status: DISCONTINUED | OUTPATIENT
Start: 2025-05-04 | End: 2025-05-07

## 2025-05-04 RX ORDER — CARVEDILOL 25 MG/1
25 TABLET ORAL 2 TIMES DAILY WITH MEALS
Status: DISCONTINUED | OUTPATIENT
Start: 2025-05-04 | End: 2025-05-07 | Stop reason: HOSPADM

## 2025-05-04 RX ORDER — TALC
6 POWDER (GRAM) TOPICAL NIGHTLY PRN
Status: DISCONTINUED | OUTPATIENT
Start: 2025-05-04 | End: 2025-05-07 | Stop reason: HOSPADM

## 2025-05-04 RX ORDER — CITALOPRAM 20 MG/1
20 TABLET ORAL DAILY
Status: DISCONTINUED | OUTPATIENT
Start: 2025-05-04 | End: 2025-05-07 | Stop reason: HOSPADM

## 2025-05-04 RX ORDER — SODIUM CHLORIDE 0.9 % (FLUSH) 0.9 %
10 SYRINGE (ML) INJECTION
Status: DISCONTINUED | OUTPATIENT
Start: 2025-05-04 | End: 2025-05-07 | Stop reason: HOSPADM

## 2025-05-04 RX ORDER — OFLOXACIN 3 MG/ML
1 SOLUTION/ DROPS OPHTHALMIC 4 TIMES DAILY
COMMUNITY
Start: 2025-04-25

## 2025-05-04 RX ORDER — CEFTRIAXONE 2 G/1
2 INJECTION, POWDER, FOR SOLUTION INTRAMUSCULAR; INTRAVENOUS
Status: COMPLETED | OUTPATIENT
Start: 2025-05-04 | End: 2025-05-04

## 2025-05-04 RX ORDER — PREDNISOLONE ACETATE 10 MG/ML
1 SUSPENSION/ DROPS OPHTHALMIC 4 TIMES DAILY
COMMUNITY
Start: 2025-04-25

## 2025-05-04 RX ORDER — KETOROLAC TROMETHAMINE 5 MG/ML
1 SOLUTION OPHTHALMIC 2 TIMES DAILY
COMMUNITY
Start: 2025-04-25

## 2025-05-04 RX ORDER — PANTOPRAZOLE SODIUM 40 MG/1
40 TABLET, DELAYED RELEASE ORAL DAILY
Status: DISCONTINUED | OUTPATIENT
Start: 2025-05-04 | End: 2025-05-07 | Stop reason: HOSPADM

## 2025-05-04 RX ORDER — MELOXICAM 15 MG/1
15 TABLET ORAL DAILY
COMMUNITY
Start: 2025-04-12

## 2025-05-04 RX ORDER — FINASTERIDE 5 MG/1
5 TABLET, FILM COATED ORAL DAILY
Status: DISCONTINUED | OUTPATIENT
Start: 2025-05-04 | End: 2025-05-07 | Stop reason: HOSPADM

## 2025-05-04 RX ORDER — ATORVASTATIN CALCIUM 40 MG/1
40 TABLET, FILM COATED ORAL NIGHTLY
Status: DISCONTINUED | OUTPATIENT
Start: 2025-05-04 | End: 2025-05-07 | Stop reason: HOSPADM

## 2025-05-04 RX ORDER — ACETAMINOPHEN 325 MG/1
650 TABLET ORAL EVERY 4 HOURS PRN
Status: DISCONTINUED | OUTPATIENT
Start: 2025-05-04 | End: 2025-05-07 | Stop reason: HOSPADM

## 2025-05-04 RX ORDER — PROCHLORPERAZINE EDISYLATE 5 MG/ML
5 INJECTION INTRAMUSCULAR; INTRAVENOUS EVERY 6 HOURS PRN
Status: DISCONTINUED | OUTPATIENT
Start: 2025-05-04 | End: 2025-05-07 | Stop reason: HOSPADM

## 2025-05-04 RX ORDER — HEPARIN SODIUM 5000 [USP'U]/ML
5000 INJECTION, SOLUTION INTRAVENOUS; SUBCUTANEOUS EVERY 12 HOURS
Status: DISCONTINUED | OUTPATIENT
Start: 2025-05-04 | End: 2025-05-07 | Stop reason: HOSPADM

## 2025-05-04 RX ORDER — METHOCARBAMOL 500 MG/1
500 TABLET, FILM COATED ORAL 4 TIMES DAILY
Status: DISCONTINUED | OUTPATIENT
Start: 2025-05-04 | End: 2025-05-07 | Stop reason: HOSPADM

## 2025-05-04 RX ORDER — ASCORBIC ACID 500 MG
500 TABLET ORAL 2 TIMES DAILY
Status: DISCONTINUED | OUTPATIENT
Start: 2025-05-04 | End: 2025-05-07 | Stop reason: HOSPADM

## 2025-05-04 RX ORDER — ACETAMINOPHEN 500 MG
1000 TABLET ORAL EVERY 8 HOURS PRN
Status: DISCONTINUED | OUTPATIENT
Start: 2025-05-04 | End: 2025-05-07 | Stop reason: HOSPADM

## 2025-05-04 RX ORDER — TAMSULOSIN HYDROCHLORIDE 0.4 MG/1
0.4 CAPSULE ORAL DAILY
Status: DISCONTINUED | OUTPATIENT
Start: 2025-05-04 | End: 2025-05-07 | Stop reason: HOSPADM

## 2025-05-04 RX ORDER — NIFEDIPINE 30 MG/1
30 TABLET, EXTENDED RELEASE ORAL DAILY
Status: DISCONTINUED | OUTPATIENT
Start: 2025-05-04 | End: 2025-05-07 | Stop reason: HOSPADM

## 2025-05-04 RX ORDER — ONDANSETRON 8 MG/1
8 TABLET, ORALLY DISINTEGRATING ORAL EVERY 8 HOURS PRN
Status: DISCONTINUED | OUTPATIENT
Start: 2025-05-04 | End: 2025-05-07 | Stop reason: HOSPADM

## 2025-05-04 RX ORDER — SENNOSIDES 8.6 MG/1
1 TABLET ORAL NIGHTLY
Status: DISCONTINUED | OUTPATIENT
Start: 2025-05-04 | End: 2025-05-07 | Stop reason: HOSPADM

## 2025-05-04 RX ADMIN — SENNOSIDES 1 TABLET: 8.6 TABLET, FILM COATED ORAL at 08:05

## 2025-05-04 RX ADMIN — METHOCARBAMOL 500 MG: 500 TABLET ORAL at 01:05

## 2025-05-04 RX ADMIN — METHOCARBAMOL 500 MG: 500 TABLET ORAL at 05:05

## 2025-05-04 RX ADMIN — METHOCARBAMOL 500 MG: 500 TABLET ORAL at 08:05

## 2025-05-04 RX ADMIN — LEVETIRACETAM 500 MG: 500 TABLET, FILM COATED ORAL at 08:05

## 2025-05-04 RX ADMIN — NIFEDIPINE 30 MG: 30 TABLET, FILM COATED, EXTENDED RELEASE ORAL at 11:05

## 2025-05-04 RX ADMIN — FINASTERIDE 5 MG: 5 TABLET, FILM COATED ORAL at 10:05

## 2025-05-04 RX ADMIN — PANTOPRAZOLE SODIUM 40 MG: 40 TABLET, DELAYED RELEASE ORAL at 10:05

## 2025-05-04 RX ADMIN — HEPARIN SODIUM 5000 UNITS: 5000 INJECTION INTRAVENOUS; SUBCUTANEOUS at 08:05

## 2025-05-04 RX ADMIN — CITALOPRAM HYDROBROMIDE 20 MG: 20 TABLET ORAL at 10:05

## 2025-05-04 RX ADMIN — HEPARIN SODIUM 5000 UNITS: 5000 INJECTION INTRAVENOUS; SUBCUTANEOUS at 09:05

## 2025-05-04 RX ADMIN — LEVETIRACETAM 500 MG: 500 TABLET, FILM COATED ORAL at 09:05

## 2025-05-04 RX ADMIN — CEFTRIAXONE 2 G: 2 INJECTION, POWDER, FOR SOLUTION INTRAMUSCULAR; INTRAVENOUS at 02:05

## 2025-05-04 RX ADMIN — ATORVASTATIN CALCIUM 40 MG: 40 TABLET, FILM COATED ORAL at 08:05

## 2025-05-04 RX ADMIN — IOHEXOL 100 ML: 350 INJECTION, SOLUTION INTRAVENOUS at 01:05

## 2025-05-04 RX ADMIN — FERROUS SULFATE TAB EC 325 MG (65 MG FE EQUIVALENT) 1 EACH: 325 (65 FE) TABLET DELAYED RESPONSE at 10:05

## 2025-05-04 RX ADMIN — CARVEDILOL 25 MG: 12.5 TABLET, FILM COATED ORAL at 09:05

## 2025-05-04 RX ADMIN — MELOXICAM 15 MG: 15 TABLET ORAL at 11:05

## 2025-05-04 RX ADMIN — Medication 500 MG: at 08:05

## 2025-05-04 RX ADMIN — CARVEDILOL 25 MG: 12.5 TABLET, FILM COATED ORAL at 05:05

## 2025-05-04 RX ADMIN — METHOCARBAMOL 500 MG: 500 TABLET ORAL at 10:05

## 2025-05-04 RX ADMIN — TAMSULOSIN HYDROCHLORIDE 0.4 MG: 0.4 CAPSULE ORAL at 10:05

## 2025-05-04 RX ADMIN — CIPROFLOXACIN 400 MG: 2 INJECTION, SOLUTION INTRAVENOUS at 06:05

## 2025-05-04 RX ADMIN — CIPROFLOXACIN 400 MG: 2 INJECTION, SOLUTION INTRAVENOUS at 08:05

## 2025-05-04 RX ADMIN — Medication 500 MG: at 10:05

## 2025-05-04 NOTE — SUBJECTIVE & OBJECTIVE
Past Medical History:   Diagnosis Date    BPH (benign prostatic hyperplasia)     Chronic hepatitis C     HLD (hyperlipidemia)     Hypercholesterolemia     Stroke        History reviewed. No pertinent surgical history.    Review of patient's allergies indicates:  No Known Allergies    No current facility-administered medications on file prior to encounter.     Current Outpatient Medications on File Prior to Encounter   Medication Sig    ascorbic acid, vitamin C, (VITAMIN C) 500 MG tablet Take 500 mg by mouth 2 (two) times daily.    atorvastatin (LIPITOR) 40 MG tablet Take 40 mg by mouth every evening.    carvediloL (COREG) 25 MG tablet Take 25 mg by mouth 2 (two) times daily with meals.    citalopram (CELEXA) 20 MG tablet Take 20 mg by mouth once daily.    ferrous sulfate 325 (65 FE) MG EC tablet Take 325 mg by mouth once daily.    finasteride (PROSCAR) 5 mg tablet Take 5 mg by mouth once daily.    levETIRAcetam (KEPPRA) 500 MG Tab Take 1 tablet (500 mg total) by mouth 2 (two) times daily.    multivitamin (ONE DAILY MULTIVITAMIN) per tablet Take 1 tablet by mouth once daily.    pantoprazole (PROTONIX) 40 MG tablet Take 40 mg by mouth once daily.    senna (SENOKOT) 8.6 mg tablet Take 1 tablet by mouth every evening.    sofosbuvir-velpatasvir 400-100 mg Tab Take 1 tablet by mouth once daily. Take Epclusa WITH food and Protonix 20 mg exactly 4 hours later.    tamsulosin (FLOMAX) 0.4 mg Cap Take by mouth once daily.    tiZANidine (ZANAFLEX) 4 MG tablet Take 4 mg by mouth 3 (three) times daily as needed (pain / muscle spasm).     Family History    None       Tobacco Use    Smoking status: Never    Smokeless tobacco: Never   Substance and Sexual Activity    Alcohol use: Not on file    Drug use: Never    Sexual activity: Not Currently     Review of Systems   Reason unable to perform ROS: aphasia.   Gastrointestinal:  Positive for abdominal pain.   Genitourinary:  Positive for flank pain.     Objective:     Vital Signs (Most  Recent):  Temp: 97.6 °F (36.4 °C) (05/04/25 0400)  Pulse: (!) 53 (05/04/25 0600)  Resp: 12 (05/04/25 0600)  BP: (!) 162/77 (05/04/25 0600)  SpO2: (!) 94 % (05/04/25 0600) Vital Signs (24h Range):  Temp:  [97.6 °F (36.4 °C)-97.7 °F (36.5 °C)] 97.6 °F (36.4 °C)  Pulse:  [53-66] 53  Resp:  [12-22] 12  SpO2:  [94 %-99 %] 94 %  BP: (119-165)/(58-78) 162/77     Weight: 74.8 kg (165 lb)  Body mass index is 21.18 kg/m².     Physical Exam  Vitals and nursing note reviewed.   Constitutional:       General: He is not in acute distress.     Appearance: He is well-developed.   HENT:      Head: Normocephalic and atraumatic.      Mouth/Throat:      Pharynx: No oropharyngeal exudate.   Eyes:      Conjunctiva/sclera: Conjunctivae normal.   Cardiovascular:      Rate and Rhythm: Normal rate and regular rhythm.      Heart sounds: Normal heart sounds.   Pulmonary:      Effort: Pulmonary effort is normal. No respiratory distress.      Breath sounds: Normal breath sounds.   Abdominal:      General: Bowel sounds are normal. There is no distension.      Palpations: Abdomen is soft.      Tenderness: There is no abdominal tenderness.   Musculoskeletal:         General: No tenderness. Normal range of motion.      Cervical back: Normal range of motion and neck supple.   Skin:     General: Skin is warm and dry.      Capillary Refill: Capillary refill takes less than 2 seconds.      Findings: No rash.   Neurological:      Mental Status: He is alert. Mental status is at baseline.      Comments: RUE contractures   Able to follow some commands  Answers yes or no to questions    Psychiatric:         Mood and Affect: Mood normal.         Behavior: Behavior normal.                Significant Labs: All pertinent labs within the past 24 hours have been reviewed.  CBC:   Recent Labs   Lab 05/03/25  2254   WBC 10.75   HGB 12.9*   HCT 38.7*   *     CMP:   Recent Labs   Lab 05/03/25  2349      K 4.0      CO2 20*   GLU 83   BUN 28*    CREATININE 1.4   CALCIUM 8.6*   PROT 7.6   ALBUMIN 3.3*   BILITOT 0.4   ALKPHOS 86   AST 48*   ALT 71*   ANIONGAP 8     Urine Studies:   Recent Labs   Lab 05/03/25  2359   COLORU [place]*   APPEARANCEUA Hazy*   PHUR 6.0   SPECGRAV 1.010   PROTEINUA Trace*   GLUCUA Negative   BILIRUBINUA Negative   OCCULTUA 1+*   NITRITE Negative   UROBILINOGEN Negative   LEUKOCYTESUR 3+*   RBCUA 31*   WBCUA >100*   BACTERIA Moderate*       Significant Imaging: I have reviewed all pertinent imaging results/findings within the past 24 hours.  Imaging Results               CT Abdomen Pelvis With IV Contrast NO Oral Contrast (Final result)  Result time 05/04/25 05:30:56      Final result by Gerard Barr MD (05/04/25 05:30:56)                   Impression:      Abdomen CT and Pelvis CT:    Imaging findings suspicious for right ureteritis, pyelitis, and possibly pyelonephritis.  Some left pyelitis is also suspected.    Asymmetric urinary bladder wall thickening versus layering debris along the right bladder wall and base, similar to priors.  Recommend correlation with urinalysis.  Note that acute cystitis or underlying neoplasm is not excluded.    Unchanged 1.1 cm cystic lesion in the pancreatic body.  Recommend MRI/MRCP in 1 year from discovery on 01/16/2025.    Additional observations and pre-existing findings as described in the body of the report.    This report was flagged in Epic as abnormal.    This final report has been modified from the preliminary report.  Please review this final report for changes.  The preliminary report does remain available in the Epic Chart.    Electronically signed by resident: Yinka Bob  Date:    05/04/2025  Time:    02:34    Electronically signed by: Gerard Barr  Date:    05/04/2025  Time:    05:30               Narrative:    EXAMINATION:  CT ABDOMEN PELVIS WITH IV CONTRAST    CLINICAL HISTORY:  Abdominal pain, acute, nonlocalized    TECHNIQUE:  Axial images of the abdomen and pelvis were  acquired after the use of 100 mL [id number] IV contrast. No oral contrast was administered.  Coronal and sagittal reconstructions were also obtained    COMPARISON:  CT urogram 01/16/2025 and CT abdomen pelvis 12/24/2024    FINDINGS:  Heart: Normal in size. No pericardial effusion. No significant calcific coronary atherosclerosis.    Lungs: Unchanged right lung base atelectasis versus scarring.  No focal consolidation, pleural effusion or pneumothorax.    Hepatobiliary: Liver is normal size.  Normal contour.  No focal hepatic lesion.  No calcified gallstones. No pericholecystic fluid or gallbladder wall thickening.No intrahepatic or extrahepatic biliary ductal dilatation.    Spleen: Unremarkable.    Pancreas: 1.1 cm low-density lesion in the pancreatic body, not significantly changed from 01/16/2025.  No ductal dilatation or peripancreatic inflammatory change.    Adrenals: Unremarkable.    Kidneys/Ureters: Normal in size, location and enhancement. Mildly asymmetric perinephric stranding, more pronounced on the right.  Slightly heterogeneous appearance of the right cortical nephrogram, relative to the left.  There is mild diffuse dilatation of the right ureter with hyperenhancement.  Mild mucosal thickening and hyperenhancement in the renal collecting systems bilaterally.  No left hydroureteronephrosis.    Bladder: Asymmetric bladder wall thickening versus layering debris along the right wall and base, similar to priors.    Reproductive organs: Unremarkable.    Peritoneum: No free air or free fluid.    Retroperitoneum: No pathologically enlarged abdominopelvic lymph nodes.    Bowel/Mesentery: Small hiatal hernia.  Gastric lumen is again poorly distended, limiting CT assessment for abnormal mural or mucosal thickening.  Bowel is normal in caliber with no evidence of obstruction or surround inflammation.  Normal appendix.    Abdominal wall:  Unremarkable.    Vasculature: No aneurysm. Moderate calcific and noncalcified  atherosclerosis.  Major abdominal aortic branch vessels are patent, noting moderate stenosis at the origins of the celiac trunk and SMA and severe stenosis at the origin of the right renal artery..  Portal vasculature is patent.    Bones: Advanced degenerative change in the lumbar spine and right femoroacetabular joint.  Unchanged grade 1 retrolisthesis of L5 on S1.   Persistent pre-existing multilevel vertebral endplate irregularities, similar to the comparison scans, likely related to Modic changes.  No acute fracture or suspicious osseous lesions.                        Preliminary result by Yinka Bob MD (05/04/25 02:47:40)                   Impression:      Mild right ureteral dilatation and hyperenhancement as well as perinephric stranding, suspicious for infectious/inflammatory pyelitis.    Asymmetric bladder wall thickening versus layering debris along the right bladder wall and base, similar to priors.  Recommend correlation with urinalysis.    Unchanged 1.1 cm cystic lesion in the pancreatic body.  Recommend MRI/MRCP in 1 year from discovery on 01/16/2025.    Additional unchanged findings as described in the body of the report.    Electronically signed by resident: Yinka Bob  Date:    05/04/2025  Time:    02:34                 Narrative:    EXAMINATION:  CT ABDOMEN PELVIS WITH IV CONTRAST    CLINICAL HISTORY:  Abdominal pain, acute, nonlocalized;    TECHNIQUE:  Axial images of the abdomen and pelvis were acquired after the use of 100 cc [id number] IV contrast. No oral contrast was administered.  Coronal and sagittal reconstructions were also obtained    COMPARISON:  CT urogram 01/16/2025 and CT abdomen pelvis 12/24/2024    FINDINGS:  Heart: Normal in size. No pericardial effusion. No significant calcific coronary atherosclerosis.    Lungs: Unchanged right lung base atelectasis versus scarring.  No focal consolidation, pleural effusion or pneumothorax.    Hepatobiliary: Liver is normal size.   Normal contour.  No focal hepatic lesion.  No calcified gallstones. No pericholecystic fluid or gallbladder wall thickening.No intrahepatic or extrahepatic biliary ductal dilatation.    Spleen: Unremarkable.    Pancreas: 1.1 cm low-density lesion in the pancreatic body, not significantly changed from 01/16/2025.  No ductal dilatation or peripancreatic inflammatory change.    Adrenals: Unremarkable.    Kidneys/Ureters: Normal in size, location and enhancement. Mildly asymmetric perinephric stranding, more pronounced on the right.  There is mild diffuse dilatation of the right ureter with hyperenhancement.  No left hydroureteronephrosis.    Bladder: Asymmetric bladder wall thickening versus layering debris along the right wall and base, similar to priors.    Reproductive organs: Unremarkable.    Peritoneum: No free air or free fluid.    Retroperitoneum: No pathologically enlarged abdominopelvic lymph nodes.    Bowel/Mesentery: Small hiatal hernia.  Stomach is otherwise unremarkable.  Bowel is normal in caliber with no evidence of obstruction or inflammation.  Normal appendix.    Abdominal wall:  Unremarkable.    Vasculature: No aneurysm. Moderate calcific and noncalcified atherosclerosis.  Major abdominal aortic branch vessels are patent, noting moderate stenosis at the origins of the celiac trunk and SMA and severe stenosis at the origin of the right renal artery..  Portal vasculature is patent.    Bones: Advanced degenerative change in the lumbar spine and right femoroacetabular joint.  Unchanged grade 1 retrolisthesis of L5 on S1. No acute fracture or suspicious osseous lesions.

## 2025-05-04 NOTE — ASSESSMENT & PLAN NOTE
Patient has chronic liver disease due to hepatitis c. Their liver disease is compensated. Hepatology has not been consulted. The patient is not on the liver transplant list. We will obtain daily CBC, CMP, and INR. Their most current Na-MELD is listed below.  MELD 3.0: 9 at 12/27/2024  4:24 AM  - liver enzymes mildly elevated. Will monitor with daily cmp  - daily INR  - recently completed epclusa treatment and is following with OP hepatology

## 2025-05-04 NOTE — PHARMACY MED REC
"Admission Medication History     The home medication history was taken by Marlen Ordonez.    You may go to "Admission" then "Reconcile Home Medications" tabs to review and/or act upon these items.     The home medication list has been updated by the Pharmacy department.   Please read ALL comments highlighted in yellow.   Please address this information as you see fit.    Feel free to contact us if you have any questions or require assistance.      The medications listed below were removed from the home medication list. Please reorder if appropriate:  Patient reports no longer taking the following medication(s):  Pantoprazole 40 mg  Epclusa 400-100 mg      Medications listed below were obtained from: Nursing home  Current Outpatient Medications on File Prior to Encounter   Medication Sig    ascorbic acid, vitamin C, (VITAMIN C) 500 MG tablet Take 500 mg by mouth 2 (two) times daily.    atorvastatin (LIPITOR) 40 MG tablet Take 40 mg by mouth every evening.    carvediloL (COREG) 25 MG tablet Take 25 mg by mouth 2 (two) times daily with meals.      citalopram (CELEXA) 20 MG tablet Take 20 mg by mouth once daily.    ferrous sulfate 325 (65 FE) MG EC tablet Take 325 mg by mouth once daily.    finasteride (PROSCAR) 5 mg tablet Take 5 mg by mouth every morning.    ketorolac 0.5% (ACULAR) 0.5 % Drop Place 1 drop into the left eye 2 (two) times daily.    levETIRAcetam (KEPPRA) 500 MG Tab Take 1 tablet (500 mg total) by mouth 2 (two) times daily.    meloxicam (MOBIC) 15 MG tablet Take 15 mg by mouth once daily.    multivitamin (ONE DAILY MULTIVITAMIN) per tablet Take 1 tablet by mouth once daily.    ofloxacin (OCUFLOX) 0.3 % ophthalmic solution Place 1 drop into the left eye 4 (four) times daily.    pantoprazole (PROTONIX) 20 MG tablet Take 20 mg by mouth once daily.    polyethylene glycol (GLYCOLAX) 17 gram PwPk Take 17 g by mouth every 12 (twelve) hours as needed for Constipation. MIX WITH 8 OZ OF WATER      prednisoLONE " acetate (PRED FORTE) 1 % DrpS Place 1 drop into the left eye 4 (four) times daily.    senna (SENOKOT) 8.6 mg tablet Take 1 tablet by mouth every evening.    tamsulosin (FLOMAX) 0.4 mg Cap Take 0.4 mg by mouth once daily.    tiZANidine (ZANAFLEX) 4 MG tablet Take 4 mg by mouth 3 (three) times daily as needed (pain / muscle spasm).               Potential issues to be addressed PRIOR TO DISCHARGE  The listed medications were obtained from another facility ([facility]). The patient may not have been able to fill these prescriptions prior to this admission and may require new scripts upon discharge.     Marlen Ordonez  EXT [id number]                  .

## 2025-05-04 NOTE — ASSESSMENT & PLAN NOTE
75 yo M with PMHx of CVA with R sided deficits and aphasia, CAD, HTN, HLD, BPH, Hep C, and seizure disorder who presented to ED from [facility] for R sided flank and abdominal pain. Afebrile and without leukocytosis. UA infectious with 3+ leuks, WBC >100, moderate bacteria. CT A/P with R ureteritis, pyelitis, and possibly pyelonephritis.  Some left pyelitis is also suspected.     - Started on IV rocephin in ED. Previous urine cultures with Klebsiella ESBL susceptible to fluoroquinolones and carbapenems. Will start IV cipro   - follow urine culture   - prn tylenol and pyridium for pain

## 2025-05-04 NOTE — CARE UPDATE
Patient examined at bedside who is resting comfortably. VSS, no acute distress. No overnight events reported. Due to patient aphasia unable to hx. Patient shakes his head yes to most questions. Continue IV cipro for UTI based on previous cultures. Follow UC. Patient hypertensive in AM to sbp 180s, started nifedipine. Continue to monitor and titrate as needed.

## 2025-05-04 NOTE — ASSESSMENT & PLAN NOTE
- CT noting Unchanged 1.1 cm cystic lesion in the pancreatic body.  Recommend MRI/MRCP in 1 year from discovery on 01/16/2025.

## 2025-05-04 NOTE — HPI
Wu Hampton is a 74 year old Black man with hypertension, hyperlipidemia, coronary artery disease, gout, chronic hepatitis C, benign prostatic hyperplasia, chronic kidney disease stage 3, history of stroke with right sided deficits and paphasia, seizure disorder, gastroesophageal reflux disease, pancreatic lesion, lumbar radiculopathy with chronic back pain. He lives in [facility] in [place], [place].    He presented to [facility] on 5/3/2025 from [facility] in [place] for severe abdominal and right flank pain.   In the emergency department, he had normal vital signs, no leukocytosis, bicarbonate 20 mmol/L, AST 48 U/L and ALT 71 U/L. Urinalysis showed >100 WBC/hpf, moderate bacteria. CT showed right ureteritis, pyelitis, and possible pyelonephritis, and suspected left pyelitis. He was given 2 grams of ceftriaxone and 4 mg of morphine. He was admitted to Hospital Medicine Team [facility] the night of 5/4/2025.

## 2025-05-04 NOTE — ED NOTES
Per MD Buck, no need for blood cultures to be drawn at this time. Okay to give antibiotics without cultures.

## 2025-05-04 NOTE — PLAN OF CARE
Inpatient Upgrade Note    Wu Hampton has warranted treatment spanning two or more midnights of hospital level care for the management of hypertensive urgency and UTI. He continues to require IV antibiotics, IV fluids, daily labs, and urine and blood culture. His condition is also complicated by the following comorbidities: Hypertension and previous stroke, BPH, hep C, hypercholesterolemia.

## 2025-05-04 NOTE — ED PROVIDER NOTES
Encounter Date: 5/3/2025       History     Chief Complaint   Patient presents with    Abdominal Pain     From [facility], sent for abdominal pain started around 10 am. 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  Patient's symptoms began earlier today, per nursing home.  Patient unable to verbalize timeline, but able to answer yes no questions.  Endorses right side and lower abdominal pain as well as penile pain.  Patient denies nausea/vomiting, fevers/chills, chest pain, shortness of breath.    The history is provided by the nursing home, the patient and medical records. The history is limited by the condition of the patient. No  was used.     Review of patient's allergies indicates:  No Known Allergies  Past Medical History:   Diagnosis Date    BPH (benign prostatic hyperplasia)     Chronic hepatitis C     HLD (hyperlipidemia)     Hypercholesterolemia     Stroke      History reviewed. No pertinent surgical history.  No family history on file.  Social History[1]      Physical Exam     Initial Vitals [05/03/25 2147]   BP Pulse Resp Temp SpO2   119/68 66 18 97.7 °F (36.5 °C) 99 %      MAP       --         Physical Exam    Nursing note and vitals reviewed.  Constitutional:   Chronically ill-appearing   HENT:   Head: Normocephalic and atraumatic.   Eyes: Conjunctivae and EOM are normal. Pupils are equal, round, and reactive to light.   Cardiovascular:  Normal rate, regular rhythm and normal heart sounds.           Pulmonary/Chest: Breath sounds normal. No respiratory distress. He has no wheezes. He has no rhonchi. He has no rales.   Abdominal: Abdomen is soft.   Mild diffuse abdominal tenderness to palpation with involuntary guarding and no rebound tenderness   Genitourinary:    Testes normal.    Uncircumcised. No phimosis, paraphimosis or penile erythema. No discharge found.    Genitourinary Comments: Nurse chaperone present for exam  No penile or scrotal erythema, induration, or skin changes that would be concerning for Monae's gangrene       Neurological: He is alert.   Right upper and lower extremity contractures from previous CVA  Expressive aphasia, able to say yes or no to questions, but otherwise unable to speak intelligibly  Follows commands         ED Course   Procedures  Labs Reviewed   URINALYSIS, REFLEX TO URINE CULTURE - Abnormal       Result Value    Color, UA Orange (*)     Appearance, UA Hazy (*)     pH, UA 6.0      Spec Grav UA 1.010      Protein, UA Trace (*)     Glucose, UA Negative      Ketones, UA Negative      Bilirubin, UA Negative      Blood, UA 1+ (*)     Nitrites, UA Negative      Urobilinogen, UA Negative      Leukocyte Esterase, UA 3+ (*)    CBC WITH DIFFERENTIAL - Abnormal    WBC 10.75      RBC 4.14 (*)     HGB 12.9 (*)     HCT 38.7 (*)     MCV 94      MCH 31.2 (*)     MCHC 33.3      RDW 14.0      Platelet Count 129 (*)     MPV 12.7      Nucleated RBC 0      Neut % 54.2      Lymph % 32.1      Mono % 10.6      Eos % 1.6      Basophil % 0.8      Imm Grans % 0.7 (*)     Neut # 5.82      Lymph # 3.45      Mono # 1.14 (*)     Eos # 0.17      Baso # 0.09      Imm Grans # 0.08 (*)    COMPREHENSIVE METABOLIC PANEL - Abnormal    Sodium 136      Potassium 4.0      Chloride 108      CO2 20 (*)     Glucose 83      BUN 28 (*)     Creatinine 1.4      Calcium 8.6 (*)     Protein Total 7.6      Albumin 3.3 (*)     Bilirubin Total 0.4      ALP 86      AST 48 (*)     ALT 71 (*)     Anion Gap 8      eGFR 53 (*)    URINALYSIS MICROSCOPIC - Abnormal    RBC, UA 31 (*)     WBC, UA >100 (*)     WBC Clumps, UA Many (*)     Bacteria, UA Moderate (*)     Non-Squamous Epithelial Cells 7 (*)     Microscopic Comment       LIPASE - Normal    Lipase Level 23     TROPONIN I HIGH SENSITIVITY - Normal     Troponin High Sensitive 6     CULTURE, URINE   CULTURE, BLOOD   CULTURE, BLOOD   CBC W/ AUTO DIFFERENTIAL    Narrative:     The following orders were created for panel order CBC auto differential.  Procedure                               Abnormality         Status                     ---------                               -----------         ------                     CBC with Differential[[id number]]       Abnormal            Final result                 Please view results for these tests on the individual orders.   GREY TOP URINE HOLD    Extra Tube Hold for add-ons.       EKG Readings: (Independently Interpreted)   Initial Reading: No STEMI. Previous EKG: Compared with most recent EKG Previous EKG Date: 02/18/2023. Rhythm: Normal Sinus Rhythm. Heart Rate: 66. T Waves Flipped: II, III, AVF, V4, V5 and V6. Clinical Impression: Normal Sinus Rhythm   ST-TW findings similar to previous EKG.  NC/QRS/QTC within normal limits       Imaging Results               CT Abdomen Pelvis With IV Contrast NO Oral Contrast (Final result)  Result time 05/04/25 05:30:56      Final result by Gerard Barr MD (05/04/25 05:30:56)                   Impression:      Abdomen CT and Pelvis CT:    Imaging findings suspicious for right ureteritis, pyelitis, and possibly pyelonephritis.  Some left pyelitis is also suspected.    Asymmetric urinary bladder wall thickening versus layering debris along the right bladder wall and base, similar to priors.  Recommend correlation with urinalysis.  Note that acute cystitis or underlying neoplasm is not excluded.    Unchanged 1.1 cm cystic lesion in the pancreatic body.  Recommend MRI/MRCP in 1 year from discovery on 01/16/2025.    Additional observations and pre-existing findings as described in the body of the report.    This report was flagged in Epic as abnormal.    This final report has been modified from the preliminary report.  Please review this final report for changes.  The preliminary  report does remain available in the Epic Chart.    Electronically signed by resident: Yinka Bob  Date:    05/04/2025  Time:    02:34    Electronically signed by: Gerard Barr  Date:    05/04/2025  Time:    05:30               Narrative:    EXAMINATION:  CT ABDOMEN PELVIS WITH IV CONTRAST    CLINICAL HISTORY:  Abdominal pain, acute, nonlocalized    TECHNIQUE:  Axial images of the abdomen and pelvis were acquired after the use of 100 mL [id number] IV contrast. No oral contrast was administered.  Coronal and sagittal reconstructions were also obtained    COMPARISON:  CT urogram 01/16/2025 and CT abdomen pelvis 12/24/2024    FINDINGS:  Heart: Normal in size. No pericardial effusion. No significant calcific coronary atherosclerosis.    Lungs: Unchanged right lung base atelectasis versus scarring.  No focal consolidation, pleural effusion or pneumothorax.    Hepatobiliary: Liver is normal size.  Normal contour.  No focal hepatic lesion.  No calcified gallstones. No pericholecystic fluid or gallbladder wall thickening.No intrahepatic or extrahepatic biliary ductal dilatation.    Spleen: Unremarkable.    Pancreas: 1.1 cm low-density lesion in the pancreatic body, not significantly changed from 01/16/2025.  No ductal dilatation or peripancreatic inflammatory change.    Adrenals: Unremarkable.    Kidneys/Ureters: Normal in size, location and enhancement. Mildly asymmetric perinephric stranding, more pronounced on the right.  Slightly heterogeneous appearance of the right cortical nephrogram, relative to the left.  There is mild diffuse dilatation of the right ureter with hyperenhancement.  Mild mucosal thickening and hyperenhancement in the renal collecting systems bilaterally.  No left hydroureteronephrosis.    Bladder: Asymmetric bladder wall thickening versus layering debris along the right wall and base, similar to priors.    Reproductive organs: Unremarkable.    Peritoneum: No free air or free  fluid.    Retroperitoneum: No pathologically enlarged abdominopelvic lymph nodes.    Bowel/Mesentery: Small hiatal hernia.  Gastric lumen is again poorly distended, limiting CT assessment for abnormal mural or mucosal thickening.  Bowel is normal in caliber with no evidence of obstruction or surround inflammation.  Normal appendix.    Abdominal wall:  Unremarkable.    Vasculature: No aneurysm. Moderate calcific and noncalcified atherosclerosis.  Major abdominal aortic branch vessels are patent, noting moderate stenosis at the origins of the celiac trunk and SMA and severe stenosis at the origin of the right renal artery..  Portal vasculature is patent.    Bones: Advanced degenerative change in the lumbar spine and right femoroacetabular joint.  Unchanged grade 1 retrolisthesis of L5 on S1.   Persistent pre-existing multilevel vertebral endplate irregularities, similar to the comparison scans, likely related to Modic changes.  No acute fracture or suspicious osseous lesions.                        Preliminary result by Yinka Bob MD (05/04/25 02:47:40)                   Impression:      Mild right ureteral dilatation and hyperenhancement as well as perinephric stranding, suspicious for infectious/inflammatory pyelitis.    Asymmetric bladder wall thickening versus layering debris along the right bladder wall and base, similar to priors.  Recommend correlation with urinalysis.    Unchanged 1.1 cm cystic lesion in the pancreatic body.  Recommend MRI/MRCP in 1 year from discovery on 01/16/2025.    Additional unchanged findings as described in the body of the report.    Electronically signed by resident: Yinka Bob  Date:    05/04/2025  Time:    02:34                 Narrative:    EXAMINATION:  CT ABDOMEN PELVIS WITH IV CONTRAST    CLINICAL HISTORY:  Abdominal pain, acute, nonlocalized;    TECHNIQUE:  Axial images of the abdomen and pelvis were acquired after the use of 100 cc [id number] IV contrast. No oral  contrast was administered.  Coronal and sagittal reconstructions were also obtained    COMPARISON:  CT urogram 01/16/2025 and CT abdomen pelvis 12/24/2024    FINDINGS:  Heart: Normal in size. No pericardial effusion. No significant calcific coronary atherosclerosis.    Lungs: Unchanged right lung base atelectasis versus scarring.  No focal consolidation, pleural effusion or pneumothorax.    Hepatobiliary: Liver is normal size.  Normal contour.  No focal hepatic lesion.  No calcified gallstones. No pericholecystic fluid or gallbladder wall thickening.No intrahepatic or extrahepatic biliary ductal dilatation.    Spleen: Unremarkable.    Pancreas: 1.1 cm low-density lesion in the pancreatic body, not significantly changed from 01/16/2025.  No ductal dilatation or peripancreatic inflammatory change.    Adrenals: Unremarkable.    Kidneys/Ureters: Normal in size, location and enhancement. Mildly asymmetric perinephric stranding, more pronounced on the right.  There is mild diffuse dilatation of the right ureter with hyperenhancement.  No left hydroureteronephrosis.    Bladder: Asymmetric bladder wall thickening versus layering debris along the right wall and base, similar to priors.    Reproductive organs: Unremarkable.    Peritoneum: No free air or free fluid.    Retroperitoneum: No pathologically enlarged abdominopelvic lymph nodes.    Bowel/Mesentery: Small hiatal hernia.  Stomach is otherwise unremarkable.  Bowel is normal in caliber with no evidence of obstruction or inflammation.  Normal appendix.    Abdominal wall:  Unremarkable.    Vasculature: No aneurysm. Moderate calcific and noncalcified atherosclerosis.  Major abdominal aortic branch vessels are patent, noting moderate stenosis at the origins of the celiac trunk and SMA and severe stenosis at the origin of the right renal artery..  Portal vasculature is patent.    Bones: Advanced degenerative change in the lumbar spine and right femoroacetabular joint.   Unchanged grade 1 retrolisthesis of L5 on S1. No acute fracture or suspicious osseous lesions.                                       Medications   sodium chloride 0.9% flush 10 mL (has no administration in time range)   melatonin tablet 6 mg (has no administration in time range)   sodium chloride 0.9% flush 5 mL (has no administration in time range)   albuterol-ipratropium 2.5 mg-0.5 mg/3 mL nebulizer solution 3 mL (has no administration in time range)   ondansetron disintegrating tablet 8 mg (has no administration in time range)   prochlorperazine injection Soln 5 mg (has no administration in time range)   polyethylene glycol packet 17 g (has no administration in time range)   simethicone chewable tablet 80 mg (has no administration in time range)   aluminum-magnesium hydroxide-simethicone 200-200-20 mg/5 mL suspension 30 mL (has no administration in time range)   acetaminophen tablet 650 mg (has no administration in time range)   acetaminophen tablet 1,000 mg (has no administration in time range)   naloxone 0.4 mg/mL injection 0.02 mg (has no administration in time range)   heparin (porcine) injection 5,000 Units (has no administration in time range)   ciprofloxacin (CIPRO)400mg/200ml D5W IVPB 400 mg (400 mg Intravenous New Bag 5/4/25 5024)   ascorbic acid (vitamin C) tablet 500 mg (has no administration in time range)   atorvastatin tablet 40 mg (has no administration in time range)   carvediloL tablet 25 mg (has no administration in time range)   citalopram tablet 20 mg (has no administration in time range)   ferrous sulfate tablet 1 each (has no administration in time range)   finasteride tablet 5 mg (has no administration in time range)   levETIRAcetam tablet 500 mg (has no administration in time range)   pantoprazole EC tablet 40 mg (has no administration in time range)   senna tablet 1 tablet (has no administration in time range)   tamsulosin 24 hr capsule 0.4 mg (has no administration in time range)    methocarbamoL tablet 500 mg (has no administration in time range)   meloxicam tablet 15 mg (has no administration in time range)   morphine injection 4 mg (4 mg Intravenous Given 5/3/25 5601)   iohexoL (OMNIPAQUE 350) injection 100 mL (100 mLs Intravenous Given 5/4/25 0156)   cefTRIAXone injection 2 g (2 g Intravenous Given 5/4/25 9846)     Medical Decision Making  74-year-old male with past medical history hyperlipidemia, chronic hepatitis-C, 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     Differential diagnosis includes, but is not limited to, UTI, appendicitis, cholecystitis, SBO, pyelonephritis, nephrolithiasis, no e/o testicular torsion    In emergency department, patient hemodynamically stable, no acute distress.  Physical exam notable for mild diffuse tenderness without focal or rebound tenderness.  Urinalysis concerning for infection.  CT abdomen and pelvis showing signs of pyelitis without signs of obstructing stone or pyelonephritis.  Patient received IV antibiotics in the emergency department.  Admitted patient to Hospital Medicine for UTI and pyelitis requiring IV antibiotics.    Amount and/or Complexity of Data Reviewed  Labs: ordered. Decision-making details documented in ED Course.  Radiology: ordered.  ECG/medicine tests: ordered and independent interpretation performed. Decision-making details documented in ED Course.    Risk  OTC drugs.  Prescription drug management.  Decision regarding hospitalization.  Risk Details: Patient received ceftriaxone in the emergency department.  Admitted patient to Hospital Medicine for pyelitis.              Attending Attestation:   Physician Attestation Statement for Resident:  As the supervising MD   Physician Attestation Statement: I have personally seen and examined this patient.   I agree with the above history.  -: 74-year-old male presenting with abdominal pain.  The patient motions to his lower  "abdomen.    Patient has a history of aphasia, history is somewhat limited.   As the supervising MD I agree with the above PE.   -: Afebrile, no distress  Abdomen soft, wincing with palpation of lower quadrants, suprapubic area  No e/o Monae's on external  exam  RUE contracted   Answers yes/no questions    As the supervising MD I agree with the above treatment, course, plan, and disposition.   -: UA consistent with infection.  Not septic at this time, no fever, no leukocytosis.   CT notable for "right ureteritis, pyelitis, and possibly pyelonephritis.  Some left pyelitis is also suspected."  No stone noted on radiology report.   Urine cx pending.   Received rocephin.    Case discussed with Hospital Medicine for admission to continue abx, trend urine culture.   Will need f/u for incidental pancreatic cystic lesion.    I have reviewed and agree with the residents interpretation of the following: lab data, CT scans and EKG.  I have reviewed the following: old records at this facility.                ED Course as of 05/04/25 0837   Sat May 03, 2025   2320 Hemoglobin(!): 12.9  Improved from 4 months ago [AB]   2320 WBC: 10.75  No leukocytosis [AB]   2344 Lipase: 23 [AB]   Sun May 04, 2025   0010 Lipase  Not concerning for pancreatitis [BH]   0010 CBC auto differential(!)  Anemia, most recent 10.4   No leukocytosis [BH]   0156 Creatinine: 1.4  Most recent 1.3 [AB]   0156 WBC: 10.75  No leukocytosis  [AB]   0206 Comprehensive metabolic panel(!)  BUN elevated, most recent 24   Decreased bicarb, most recent 22 [BH]   0206 Urinalysis, Reflex to Urine Culture Urine, Clean Catch(!)  Concerning for infection, will give ceftriaxone [BH]   0350 POCT ARTERIAL BLOOD GAS  WNL, lowering concern for sepsis [BH]      ED Course User Index  [AB] Kelvin Corona MD  [] Prince Jane MD                           Clinical Impression:  Final diagnoses:  [R10.9] Abdominal pain  [N12] Pyelitis (Primary)  [N30.01] Acute cystitis with " hematuria          ED Disposition Condition    Observation                   Prince Jane MD  Resident  05/04/25 0836       Prince Jane MD  Resident  05/04/25 0837         [1]   Social History  Tobacco Use    Smoking status: Never    Smokeless tobacco: Never   Substance Use Topics    Drug use: Never        Kelvin Corona MD  05/04/25 3220

## 2025-05-04 NOTE — ED NOTES
Assumed care of patient at this time. Patient is resting comfortably in bed in lowest, locked position with bed rails raised x2 in NAD. Pt remains in hospital gown, cardiac monitoring in place. Call light is within reach of patient. Patient turned to right side lying position. Pt denies any other needs at this time.

## 2025-05-04 NOTE — ED NOTES
Received report and assumed care of patient at this time.  Patient has a calm affect and is aware of environment. Airway is open and patent, respirations are spontaneous, normal effort and rate noted. Pt denies chest pain at this time. Skin warm and dry. Movement to all extremities noted. Bed placed in low position, side rails up x 2, call light is within reach of patient. Explanation of care provided to patient. Patient offers no complaints at this time. Awaiting further MD orders and bed assignment, POC continues.

## 2025-05-04 NOTE — ASSESSMENT & PLAN NOTE
73 yo M with PMHx of CVA with R sided deficits and aphasia, CAD, HTN, HLD, BPH, Hep C, and seizure disorder who presented to ED from [facility] for R sided flank and abdominal pain. Afebrile and without leukocytosis. UA infectious with 3+ leuks, WBC >100, moderate bacteria. CT A/P with R ureteritis, pyelitis, and possibly pyelonephritis.  Some left pyelitis is also suspected.     - Started on IV rocephin in ED. Previous urine cultures with Klebsiella ESBL susceptible to fluoroquinolones and carbapenems. Will start IV cipro   - follow urine culture   - prn tylenol and pyridium for pain

## 2025-05-04 NOTE — H&P
[facility] - Emergency Dept  [facility] Medicine  History & Physical    Patient Name: Wu Hampton  MRN: [id number]  Patient Class: OP- Observation  Admission Date: 5/3/2025  Attending Physician: Nikhil Ballard MD   Primary Care Provider: Danielle, Primary Doctor         Patient information was obtained from patient and ER records.     Subjective:     Principal Problem:Pyelonephritis of right kidney    Chief Complaint:   Chief Complaint   Patient presents with    Abdominal Pain     From [facility], sent for abdominal pain started around 10 am. Hx of aphasia, stroke and residual deficits to right side        HPI: Wu Hampton is a 73 yo M with PMHx of CVA with R sided deficits and aphasia, CAD, HTN, HLD, BPH, Hep C, and seizure disorder who presented to ED from [facility] for R sided flank pain. Per NH, patient c/o 10/10 abdominal and R flank pain. Unable to obtain hx of patient due to aphasia. He is able to respond yes or no to some questions.     In ED: Afebrile. HR 50-60s. BP elevated. No leukocytosis. CMP notable for serum bicarb 20, Cr 1.4, AST 48, and ALT 71. HS trop wnl. UA infectious with 3+ leuks, WBC >100, moderate bacteria. CT A/P with R ureteritis, pyelitis, and possibly pyelonephritis.  Some left pyelitis is also suspected. Given IV rocephin 2g and IV morphine 4 mg. Admitted to .     Past Medical History:   Diagnosis Date    BPH (benign prostatic hyperplasia)     Chronic hepatitis C     HLD (hyperlipidemia)     Hypercholesterolemia     Stroke        History reviewed. No pertinent surgical history.    Review of patient's allergies indicates:  No Known Allergies    No current facility-administered medications on file prior to encounter.     Current Outpatient Medications on File Prior to Encounter   Medication Sig    ascorbic acid, vitamin C, (VITAMIN C) 500 MG tablet Take 500 mg by mouth 2 (two) times daily.    atorvastatin (LIPITOR) 40 MG tablet Take 40 mg by mouth every evening.    carvediloL (COREG)  25 MG tablet Take 25 mg by mouth 2 (two) times daily with meals.    citalopram (CELEXA) 20 MG tablet Take 20 mg by mouth once daily.    ferrous sulfate 325 (65 FE) MG EC tablet Take 325 mg by mouth once daily.    finasteride (PROSCAR) 5 mg tablet Take 5 mg by mouth once daily.    levETIRAcetam (KEPPRA) 500 MG Tab Take 1 tablet (500 mg total) by mouth 2 (two) times daily.    multivitamin (ONE DAILY MULTIVITAMIN) per tablet Take 1 tablet by mouth once daily.    pantoprazole (PROTONIX) 40 MG tablet Take 40 mg by mouth once daily.    senna (SENOKOT) 8.6 mg tablet Take 1 tablet by mouth every evening.    sofosbuvir-velpatasvir 400-100 mg Tab Take 1 tablet by mouth once daily. Take Epclusa WITH food and Protonix 20 mg exactly 4 hours later.    tamsulosin (FLOMAX) 0.4 mg Cap Take by mouth once daily.    tiZANidine (ZANAFLEX) 4 MG tablet Take 4 mg by mouth 3 (three) times daily as needed (pain / muscle spasm).     Family History    None       Tobacco Use    Smoking status: Never    Smokeless tobacco: Never   Substance and Sexual Activity    Alcohol use: Not on file    Drug use: Never    Sexual activity: Not Currently     Review of Systems   Reason unable to perform ROS: aphasia.   Gastrointestinal:  Positive for abdominal pain.   Genitourinary:  Positive for flank pain.     Objective:     Vital Signs (Most Recent):  Temp: 97.6 °F (36.4 °C) (05/04/25 0400)  Pulse: (!) 53 (05/04/25 0600)  Resp: 12 (05/04/25 0600)  BP: (!) 162/77 (05/04/25 0600)  SpO2: (!) 94 % (05/04/25 0600) Vital Signs (24h Range):  Temp:  [97.6 °F (36.4 °C)-97.7 °F (36.5 °C)] 97.6 °F (36.4 °C)  Pulse:  [53-66] 53  Resp:  [12-22] 12  SpO2:  [94 %-99 %] 94 %  BP: (119-165)/(58-78) 162/77     Weight: 74.8 kg (165 lb)  Body mass index is 21.18 kg/m².     Physical Exam  Vitals and nursing note reviewed.   Constitutional:       General: He is not in acute distress.     Appearance: He is well-developed.   HENT:      Head: Normocephalic and atraumatic.       Mouth/Throat:      Pharynx: No oropharyngeal exudate.   Eyes:      Conjunctiva/sclera: Conjunctivae normal.   Cardiovascular:      Rate and Rhythm: Normal rate and regular rhythm.      Heart sounds: Normal heart sounds.   Pulmonary:      Effort: Pulmonary effort is normal. No respiratory distress.      Breath sounds: Normal breath sounds.   Abdominal:      General: Bowel sounds are normal. There is no distension.      Palpations: Abdomen is soft.      Tenderness: There is no abdominal tenderness.   Musculoskeletal:         General: No tenderness. Normal range of motion.      Cervical back: Normal range of motion and neck supple.   Skin:     General: Skin is warm and dry.      Capillary Refill: Capillary refill takes less than 2 seconds.      Findings: No rash.   Neurological:      Mental Status: He is alert. Mental status is at baseline.      Comments: RUE contractures   Able to follow some commands  Answers yes or no to questions    Psychiatric:         Mood and Affect: Mood normal.         Behavior: Behavior normal.                Significant Labs: All pertinent labs within the past 24 hours have been reviewed.  CBC:   Recent Labs   Lab 05/03/25  2254   WBC 10.75   HGB 12.9*   HCT 38.7*   *     CMP:   Recent Labs   Lab 05/03/25  2349      K 4.0      CO2 20*   GLU 83   BUN 28*   CREATININE 1.4   CALCIUM 8.6*   PROT 7.6   ALBUMIN 3.3*   BILITOT 0.4   ALKPHOS 86   AST 48*   ALT 71*   ANIONGAP 8     Urine Studies:   Recent Labs   Lab 05/03/25  2359   COLORU [place]*   APPEARANCEUA Hazy*   PHUR 6.0   SPECGRAV 1.010   PROTEINUA Trace*   GLUCUA Negative   BILIRUBINUA Negative   OCCULTUA 1+*   NITRITE Negative   UROBILINOGEN Negative   LEUKOCYTESUR 3+*   RBCUA 31*   WBCUA >100*   BACTERIA Moderate*       Significant Imaging: I have reviewed all pertinent imaging results/findings within the past 24 hours.  Imaging Results               CT Abdomen Pelvis With IV Contrast NO Oral Contrast (Final result)   Result time 05/04/25 05:30:56      Final result by Gerard Barr MD (05/04/25 05:30:56)                   Impression:      Abdomen CT and Pelvis CT:    Imaging findings suspicious for right ureteritis, pyelitis, and possibly pyelonephritis.  Some left pyelitis is also suspected.    Asymmetric urinary bladder wall thickening versus layering debris along the right bladder wall and base, similar to priors.  Recommend correlation with urinalysis.  Note that acute cystitis or underlying neoplasm is not excluded.    Unchanged 1.1 cm cystic lesion in the pancreatic body.  Recommend MRI/MRCP in 1 year from discovery on 01/16/2025.    Additional observations and pre-existing findings as described in the body of the report.    This report was flagged in Epic as abnormal.    This final report has been modified from the preliminary report.  Please review this final report for changes.  The preliminary report does remain available in the Epic Chart.    Electronically signed by resident: Yinka Bob  Date:    05/04/2025  Time:    02:34    Electronically signed by: Gerard Barr  Date:    05/04/2025  Time:    05:30               Narrative:    EXAMINATION:  CT ABDOMEN PELVIS WITH IV CONTRAST    CLINICAL HISTORY:  Abdominal pain, acute, nonlocalized    TECHNIQUE:  Axial images of the abdomen and pelvis were acquired after the use of 100 mL [id number] IV contrast. No oral contrast was administered.  Coronal and sagittal reconstructions were also obtained    COMPARISON:  CT urogram 01/16/2025 and CT abdomen pelvis 12/24/2024    FINDINGS:  Heart: Normal in size. No pericardial effusion. No significant calcific coronary atherosclerosis.    Lungs: Unchanged right lung base atelectasis versus scarring.  No focal consolidation, pleural effusion or pneumothorax.    Hepatobiliary: Liver is normal size.  Normal contour.  No focal hepatic lesion.  No calcified gallstones. No pericholecystic fluid or gallbladder wall thickening.No  intrahepatic or extrahepatic biliary ductal dilatation.    Spleen: Unremarkable.    Pancreas: 1.1 cm low-density lesion in the pancreatic body, not significantly changed from 01/16/2025.  No ductal dilatation or peripancreatic inflammatory change.    Adrenals: Unremarkable.    Kidneys/Ureters: Normal in size, location and enhancement. Mildly asymmetric perinephric stranding, more pronounced on the right.  Slightly heterogeneous appearance of the right cortical nephrogram, relative to the left.  There is mild diffuse dilatation of the right ureter with hyperenhancement.  Mild mucosal thickening and hyperenhancement in the renal collecting systems bilaterally.  No left hydroureteronephrosis.    Bladder: Asymmetric bladder wall thickening versus layering debris along the right wall and base, similar to priors.    Reproductive organs: Unremarkable.    Peritoneum: No free air or free fluid.    Retroperitoneum: No pathologically enlarged abdominopelvic lymph nodes.    Bowel/Mesentery: Small hiatal hernia.  Gastric lumen is again poorly distended, limiting CT assessment for abnormal mural or mucosal thickening.  Bowel is normal in caliber with no evidence of obstruction or surround inflammation.  Normal appendix.    Abdominal wall:  Unremarkable.    Vasculature: No aneurysm. Moderate calcific and noncalcified atherosclerosis.  Major abdominal aortic branch vessels are patent, noting moderate stenosis at the origins of the celiac trunk and SMA and severe stenosis at the origin of the right renal artery..  Portal vasculature is patent.    Bones: Advanced degenerative change in the lumbar spine and right femoroacetabular joint.  Unchanged grade 1 retrolisthesis of L5 on S1.   Persistent pre-existing multilevel vertebral endplate irregularities, similar to the comparison scans, likely related to Modic changes.  No acute fracture or suspicious osseous lesions.                        Preliminary result by Yinka Bob MD  (05/04/25 02:47:40)                   Impression:      Mild right ureteral dilatation and hyperenhancement as well as perinephric stranding, suspicious for infectious/inflammatory pyelitis.    Asymmetric bladder wall thickening versus layering debris along the right bladder wall and base, similar to priors.  Recommend correlation with urinalysis.    Unchanged 1.1 cm cystic lesion in the pancreatic body.  Recommend MRI/MRCP in 1 year from discovery on 01/16/2025.    Additional unchanged findings as described in the body of the report.    Electronically signed by resident: Yinka Bob  Date:    05/04/2025  Time:    02:34                 Narrative:    EXAMINATION:  CT ABDOMEN PELVIS WITH IV CONTRAST    CLINICAL HISTORY:  Abdominal pain, acute, nonlocalized;    TECHNIQUE:  Axial images of the abdomen and pelvis were acquired after the use of 100 cc [id number] IV contrast. No oral contrast was administered.  Coronal and sagittal reconstructions were also obtained    COMPARISON:  CT urogram 01/16/2025 and CT abdomen pelvis 12/24/2024    FINDINGS:  Heart: Normal in size. No pericardial effusion. No significant calcific coronary atherosclerosis.    Lungs: Unchanged right lung base atelectasis versus scarring.  No focal consolidation, pleural effusion or pneumothorax.    Hepatobiliary: Liver is normal size.  Normal contour.  No focal hepatic lesion.  No calcified gallstones. No pericholecystic fluid or gallbladder wall thickening.No intrahepatic or extrahepatic biliary ductal dilatation.    Spleen: Unremarkable.    Pancreas: 1.1 cm low-density lesion in the pancreatic body, not significantly changed from 01/16/2025.  No ductal dilatation or peripancreatic inflammatory change.    Adrenals: Unremarkable.    Kidneys/Ureters: Normal in size, location and enhancement. Mildly asymmetric perinephric stranding, more pronounced on the right.  There is mild diffuse dilatation of the right ureter with hyperenhancement.  No left  hydroureteronephrosis.    Bladder: Asymmetric bladder wall thickening versus layering debris along the right wall and base, similar to priors.    Reproductive organs: Unremarkable.    Peritoneum: No free air or free fluid.    Retroperitoneum: No pathologically enlarged abdominopelvic lymph nodes.    Bowel/Mesentery: Small hiatal hernia.  Stomach is otherwise unremarkable.  Bowel is normal in caliber with no evidence of obstruction or inflammation.  Normal appendix.    Abdominal wall:  Unremarkable.    Vasculature: No aneurysm. Moderate calcific and noncalcified atherosclerosis.  Major abdominal aortic branch vessels are patent, noting moderate stenosis at the origins of the celiac trunk and SMA and severe stenosis at the origin of the right renal artery..  Portal vasculature is patent.    Bones: Advanced degenerative change in the lumbar spine and right femoroacetabular joint.  Unchanged grade 1 retrolisthesis of L5 on S1. No acute fracture or suspicious osseous lesions.                                    Assessment/Plan:     Assessment & Plan  Pyelonephritis of right kidney  Acute cystitis  75 yo M with PMHx of CVA with R sided deficits and aphasia, CAD, HTN, HLD, BPH, Hep C, and seizure disorder who presented to ED from [facility] for R sided flank and abdominal pain. Afebrile and without leukocytosis. UA infectious with 3+ leuks, WBC >100, moderate bacteria. CT A/P with R ureteritis, pyelitis, and possibly pyelonephritis.  Some left pyelitis is also suspected.     - Started on IV rocephin in ED. Previous urine cultures with Klebsiella ESBL susceptible to fluoroquinolones and carbapenems. Will start IV cipro   - follow urine culture   - prn tylenol and pyridium for pain   History of CVA with residual deficit  - continue statin   - mechanical soft diet   BPH (benign prostatic hyperplasia)  - continue flomax and finasteride   Essential hypertension  Patient's blood pressure range in the last 24 hours was: BP  Min:  119/68  Max: 166/81.The patient's inpatient anti-hypertensive regimen is listed below:  Current Antihypertensives  carvediloL tablet 25 mg, 2 times daily with meals, Oral    Plan  - BP is controlled, no changes needed to their regimen  Chronic hepatitis C without hepatic coma  Patient has chronic liver disease due to hepatitis c. Their liver disease is compensated. Hepatology has not been consulted. The patient is not on the liver transplant list. We will obtain daily CBC, CMP, and INR. Their most current Na-MELD is listed below.  MELD 3.0: 9 at 12/27/2024  4:24 AM  - liver enzymes mildly elevated. Will monitor with daily cmp  - daily INR  - recently completed epclusa treatment and is following with OP hepatology   Gastroesophageal reflux disease without esophagitis  - continue ppi   Pancreatic lesion  - CT noting Unchanged 1.1 cm cystic lesion in the pancreatic body.  Recommend MRI/MRCP in 1 year from discovery on 01/16/2025.   Seizure disorder  - continue keppra  - seizure precautions   Right renal artery stenosis  - incidental finding on CT  - continue statin   VTE Risk Mitigation (From admission, onward)           Ordered     heparin (porcine) injection 5,000 Units  Every 12 hours         05/04/25 0515     IP VTE HIGH RISK PATIENT  Once         05/04/25 0515     Place sequential compression device  Until discontinued         05/04/25 0428                         On 05/04/2025, patient should be placed in hospital observation services under my care in collaboration with Dr. Hollis Church.           Jana Taylor PA-C  Department of Hospital Medicine  [facility] - Emergency Dept

## 2025-05-04 NOTE — ASSESSMENT & PLAN NOTE
Patient's blood pressure range in the last 24 hours was: BP  Min: 119/68  Max: 166/81.The patient's inpatient anti-hypertensive regimen is listed below:  Current Antihypertensives  carvediloL tablet 25 mg, 2 times daily with meals, Oral    Plan  - BP is controlled, no changes needed to their regimen

## 2025-05-05 PROBLEM — K86.9 PANCREATIC LESION: Chronic | Status: ACTIVE | Noted: 2025-05-04

## 2025-05-05 PROBLEM — M51.369 DEGENERATION OF LUMBAR INTERVERTEBRAL DISC: Chronic | Status: ACTIVE | Noted: 2025-05-04

## 2025-05-05 PROBLEM — I69.30 HISTORY OF CVA WITH RESIDUAL DEFICIT: Chronic | Status: ACTIVE | Noted: 2024-12-24

## 2025-05-05 PROBLEM — K21.9 GASTROESOPHAGEAL REFLUX DISEASE WITHOUT ESOPHAGITIS: Chronic | Status: ACTIVE | Noted: 2025-05-04

## 2025-05-05 PROBLEM — N17.9 AKI (ACUTE KIDNEY INJURY): Status: RESOLVED | Noted: 2024-12-24 | Resolved: 2025-05-05

## 2025-05-05 PROBLEM — M54.9 CHRONIC BACK PAIN: Chronic | Status: ACTIVE | Noted: 2021-04-25

## 2025-05-05 PROBLEM — N40.0 BPH (BENIGN PROSTATIC HYPERPLASIA): Chronic | Status: ACTIVE | Noted: 2024-12-24

## 2025-05-05 PROBLEM — M1A.9XX0 CHRONIC GOUT WITHOUT TOPHUS: Chronic | Status: ACTIVE | Noted: 2025-05-04

## 2025-05-05 PROBLEM — M54.16 LUMBAR RADICULOPATHY, CHRONIC: Chronic | Status: ACTIVE | Noted: 2021-04-25

## 2025-05-05 PROBLEM — B18.2 CHRONIC HEPATITIS C WITHOUT HEPATIC COMA: Chronic | Status: ACTIVE | Noted: 2025-01-27

## 2025-05-05 PROBLEM — I25.10 CORONARY ARTERY DISEASE INVOLVING NATIVE CORONARY ARTERY OF NATIVE HEART WITHOUT ANGINA PECTORIS: Chronic | Status: ACTIVE | Noted: 2025-05-04

## 2025-05-05 PROBLEM — G89.29 CHRONIC BACK PAIN: Chronic | Status: ACTIVE | Noted: 2021-04-25

## 2025-05-05 PROBLEM — G40.909 SEIZURE DISORDER: Chronic | Status: ACTIVE | Noted: 2025-05-04

## 2025-05-05 PROBLEM — I10 ESSENTIAL HYPERTENSION: Chronic | Status: ACTIVE | Noted: 2021-02-13

## 2025-05-05 LAB
ALBUMIN SERPL BCP-MCNC: 3.1 G/DL (ref 3.5–5.2)
ALP SERPL-CCNC: 94 UNIT/L (ref 40–150)
ALT SERPL W/O P-5'-P-CCNC: 62 UNIT/L (ref 10–44)
ANION GAP (OHS): 8 MMOL/L (ref 8–16)
AST SERPL-CCNC: 40 UNIT/L (ref 11–45)
BILIRUB SERPL-MCNC: 0.3 MG/DL (ref 0.1–1)
BUN SERPL-MCNC: 20 MG/DL (ref 8–23)
CALCIUM SERPL-MCNC: 8.6 MG/DL (ref 8.7–10.5)
CHLORIDE SERPL-SCNC: 104 MMOL/L (ref 95–110)
CO2 SERPL-SCNC: 24 MMOL/L (ref 23–29)
CREAT SERPL-MCNC: 1.4 MG/DL (ref 0.5–1.4)
ERYTHROCYTE [DISTWIDTH] IN BLOOD BY AUTOMATED COUNT: 13.6 % (ref 11.5–14.5)
GFR SERPLBLD CREATININE-BSD FMLA CKD-EPI: 53 ML/MIN/1.73/M2
GLUCOSE SERPL-MCNC: 75 MG/DL (ref 70–110)
HCT VFR BLD AUTO: 41.5 % (ref 40–54)
HGB BLD-MCNC: 12.9 GM/DL (ref 14–18)
INR PPP: 1.1 (ref 0.8–1.2)
MAGNESIUM SERPL-MCNC: 2 MG/DL (ref 1.6–2.6)
MCH RBC QN AUTO: 30.1 PG (ref 27–31)
MCHC RBC AUTO-ENTMCNC: 31.1 G/DL (ref 32–36)
MCV RBC AUTO: 97 FL (ref 82–98)
PLATELET # BLD AUTO: 133 K/UL (ref 150–450)
PMV BLD AUTO: 13.2 FL (ref 9.2–12.9)
POTASSIUM SERPL-SCNC: 4.1 MMOL/L (ref 3.5–5.1)
PROT SERPL-MCNC: 7.5 GM/DL (ref 6–8.4)
PROTHROMBIN TIME: 12.4 SECONDS (ref 9–12.5)
RBC # BLD AUTO: 4.29 M/UL (ref 4.6–6.2)
SODIUM SERPL-SCNC: 136 MMOL/L (ref 136–145)
WBC # BLD AUTO: 12.16 K/UL (ref 3.9–12.7)

## 2025-05-05 PROCEDURE — 27000207 HC ISOLATION

## 2025-05-05 PROCEDURE — 80053 COMPREHEN METABOLIC PANEL: CPT

## 2025-05-05 PROCEDURE — 36415 COLL VENOUS BLD VENIPUNCTURE: CPT

## 2025-05-05 PROCEDURE — 25000003 PHARM REV CODE 250

## 2025-05-05 PROCEDURE — 21400001 HC TELEMETRY ROOM

## 2025-05-05 PROCEDURE — 83735 ASSAY OF MAGNESIUM: CPT

## 2025-05-05 PROCEDURE — 63600175 PHARM REV CODE 636 W HCPCS

## 2025-05-05 PROCEDURE — 85610 PROTHROMBIN TIME: CPT

## 2025-05-05 PROCEDURE — 85027 COMPLETE CBC AUTOMATED: CPT

## 2025-05-05 RX ADMIN — CIPROFLOXACIN 400 MG: 2 INJECTION, SOLUTION INTRAVENOUS at 10:05

## 2025-05-05 RX ADMIN — PANTOPRAZOLE SODIUM 40 MG: 40 TABLET, DELAYED RELEASE ORAL at 10:05

## 2025-05-05 RX ADMIN — CITALOPRAM HYDROBROMIDE 20 MG: 20 TABLET ORAL at 10:05

## 2025-05-05 RX ADMIN — SENNOSIDES 1 TABLET: 8.6 TABLET, FILM COATED ORAL at 09:05

## 2025-05-05 RX ADMIN — Medication 500 MG: at 10:05

## 2025-05-05 RX ADMIN — LEVETIRACETAM 500 MG: 500 TABLET, FILM COATED ORAL at 09:05

## 2025-05-05 RX ADMIN — HEPARIN SODIUM 5000 UNITS: 5000 INJECTION INTRAVENOUS; SUBCUTANEOUS at 10:05

## 2025-05-05 RX ADMIN — MELOXICAM 15 MG: 15 TABLET ORAL at 10:05

## 2025-05-05 RX ADMIN — METHOCARBAMOL 500 MG: 500 TABLET ORAL at 10:05

## 2025-05-05 RX ADMIN — HEPARIN SODIUM 5000 UNITS: 5000 INJECTION INTRAVENOUS; SUBCUTANEOUS at 09:05

## 2025-05-05 RX ADMIN — METHOCARBAMOL 500 MG: 500 TABLET ORAL at 06:05

## 2025-05-05 RX ADMIN — ATORVASTATIN CALCIUM 40 MG: 40 TABLET, FILM COATED ORAL at 09:05

## 2025-05-05 RX ADMIN — CIPROFLOXACIN 400 MG: 2 INJECTION, SOLUTION INTRAVENOUS at 09:05

## 2025-05-05 RX ADMIN — FINASTERIDE 5 MG: 5 TABLET, FILM COATED ORAL at 10:05

## 2025-05-05 RX ADMIN — LEVETIRACETAM 500 MG: 500 TABLET, FILM COATED ORAL at 10:05

## 2025-05-05 RX ADMIN — FERROUS SULFATE TAB EC 325 MG (65 MG FE EQUIVALENT) 1 EACH: 325 (65 FE) TABLET DELAYED RESPONSE at 10:05

## 2025-05-05 RX ADMIN — Medication 500 MG: at 09:05

## 2025-05-05 RX ADMIN — TAMSULOSIN HYDROCHLORIDE 0.4 MG: 0.4 CAPSULE ORAL at 10:05

## 2025-05-05 NOTE — PROGRESS NOTES
[facility] Medicine  Progress Note    Patient Name: Wu Hampton  MRN: [id number]  Patient Class: IP- Inpatient   Admission Date: 5/3/2025  Length of Stay: 1 days  Attending Physician: Aleksandar Grover MD  Primary Care Provider: Danielle, Primary Doctor        Subjective     Principal Problem:Pyelonephritis of right kidney        HPI:  Wu Hampton is a 74 year old Black man with hypertension, hyperlipidemia, coronary artery disease, gout, chronic hepatitis C, benign prostatic hyperplasia, chronic kidney disease stage 3, history of stroke with right sided deficits and paphasia, seizure disorder, gastroesophageal reflux disease, pancreatic lesion, lumbar radiculopathy with chronic back pain. He lives in [facility].    He presented to [facility] on 5/3/2025 from [facility] in [place] for severe abdominal and right flank pain.   In the emergency department, he had normal vital signs, no leukocytosis, bicarbonate 20 mmol/L, AST 48 U/L and ALT 71 U/L. Urinalysis showed >100 WBC/hpf, moderate bacteria. CT showed right ureteritis, pyelitis, and possible pyelonephritis, and suspected left pyelitis. He was given 2 grams of ceftriaxone and 4 mg of morphine. He was admitted to Hospital Medicine Team U the night of 5/4/2025.     Overview/Hospital Course:  He was put on ciprofloxacin. Urine culture grew >[id number] cfu/mL Gram negative rods.     Interval History: Admitted overnight. On ciprofloxacin. Aphasic but gestures for what he wants and nods appropriately. Asks me to open the curtain and turn the overhead light off.     Review of Systems   Unable to perform ROS: Patient nonverbal     Objective:     Vital Signs (Most Recent):  Temp: 97.9 °F (36.6 °C) (05/05/25 0756)  Pulse: 75 (05/05/25 1047)  Resp: 18 (05/05/25 0756)  BP: 101/64 (05/05/25 0756)  SpO2: 98 % (05/05/25 0756) Vital Signs (24h Range):  Temp:  [97.7 °F (36.5 °C)-98.5 °F (36.9 °C)] 97.9 °F (36.6 °C)  Pulse:   [59-75] 75  Resp:  [18] 18  SpO2:  [96 %-98 %] 98 %  BP: ()/(50-77) 101/64     Weight: 74.8 kg (165 lb)  Body mass index is 21.18 kg/m².    Intake/Output Summary (Last 24 hours) at 5/5/2025 1116  Last data filed at 5/5/2025 0632  Gross per 24 hour   Intake 150 ml   Output 500 ml   Net -350 ml         Physical Exam  Vitals and nursing note reviewed.   Constitutional:       General: He is not in acute distress.     Appearance: He is well-developed and normal weight. He is not diaphoretic.      Interventions: He is not intubated.  Pulmonary:      Effort: Pulmonary effort is normal. No accessory muscle usage or respiratory distress. He is not intubated.   Skin:     General: Skin is warm and dry.      Coloration: Skin is not jaundiced or pale.   Neurological:      Mental Status: He is alert. Mental status is at baseline.      Motor: No tremor or seizure activity.   Psychiatric:         Attention and Perception: Attention normal.         Mood and Affect: Affect normal.         Speech: He is noncommunicative.         Behavior: Behavior is not agitated, aggressive or combative. Behavior is cooperative.               Significant Labs: All pertinent labs within the past 24 hours have been reviewed.    Significant Imaging: I have reviewed all pertinent imaging results/findings within the past 24 hours.  CT Abdomen Pelvis With IV Contrast NO Oral Contrast 5/4/25: FINDINGS:   Heart: Normal in size. No pericardial effusion. No significant calcific coronary atherosclerosis.   Lungs: Unchanged right lung base atelectasis versus scarring.  No focal consolidation, pleural effusion or pneumothorax.   Hepatobiliary: Liver is normal size.  Normal contour.  No focal hepatic lesion.  No calcified gallstones. No pericholecystic fluid or gallbladder wall thickening.No intrahepatic or extrahepatic biliary ductal dilatation.   Spleen: Unremarkable.   Pancreas: 1.1 cm low-density lesion in the pancreatic body, not significantly changed  from 01/16/2025.  No ductal dilatation or peripancreatic inflammatory change.   Adrenals: Unremarkable.   Kidneys/Ureters: Normal in size, location and enhancement. Mildly asymmetric perinephric stranding, more pronounced on the right.  Slightly heterogeneous appearance of the right cortical nephrogram, relative to the left.  There is mild diffuse dilatation of the right ureter with hyperenhancement.  Mild mucosal thickening and hyperenhancement in the renal collecting systems bilaterally.  No left hydroureteronephrosis.   Bladder: Asymmetric bladder wall thickening versus layering debris along the right wall and base, similar to priors.   Reproductive organs: Unremarkable.   Peritoneum: No free air or free fluid.   Retroperitoneum: No pathologically enlarged abdominopelvic lymph nodes.   Bowel/Mesentery: Small hiatal hernia.  Gastric lumen is again poorly distended, limiting CT assessment for abnormal mural or mucosal thickening.  Bowel is normal in caliber with no evidence of obstruction or surround inflammation.  Normal appendix.   Abdominal wall:  Unremarkable.   Vasculature: No aneurysm. Moderate calcific and noncalcified atherosclerosis.  Major abdominal aortic branch vessels are patent, noting moderate stenosis at the origins of the celiac trunk and SMA and severe stenosis at the origin of the right renal artery..  Portal vasculature is patent.   Bones: Advanced degenerative change in the lumbar spine and right femoroacetabular joint.  Unchanged grade 1 retrolisthesis of L5 on S1.   Persistent pre-existing multilevel vertebral endplate irregularities, similar to the comparison scans, likely related to Modic changes.  No acute fracture or suspicious osseous lesions.   Impression:  Abdomen CT and Pelvis CT:   Imaging findings suspicious for right ureteritis, pyelitis, and possibly pyelonephritis.  Some left pyelitis is also suspected.   Asymmetric urinary bladder wall thickening versus layering debris along the  right bladder wall and base, similar to priors.  Recommend correlation with urinalysis.  Note that acute cystitis or underlying neoplasm is not excluded.   Unchanged 1.1 cm cystic lesion in the pancreatic body.  Recommend MRI/MRCP in 1 year from discovery on 01/16/2025.   Additional observations and pre-existing findings as described in the body of the report.       Assessment & Plan  Pyelonephritis of right kidney  Giving ciprofloxacin. Follow up urine culture results.   History of CVA with residual deficit  Continue home atorvastatin. Mechanical soft diet.  BPH (benign prostatic hyperplasia)  Continue home tamsulosin and finasteride   Essential hypertension  Patient's blood pressure range in the last 24 hours was: BP  Min: 94/50  Max: 129/77.The patient's inpatient anti-hypertensive regimen is listed below:  Current Antihypertensives  carvediloL tablet 25 mg, 2 times daily with meals, Oral  NIFEdipine 24 hr tablet 30 mg, Daily, Oral    Plan  - BP is controlled, no changes needed to their regimen  Chronic hepatitis C without hepatic coma  Recently completed Epclusa. Followed in Hepatology clinic.  Gastroesophageal reflux disease without esophagitis  Continue home pantoprazole.  Pancreatic lesion  Chronic, unchanged.  Seizure disorder  Continue home levetiracetam. Seizure precautions.   Right renal artery stenosis  Incidental finding.  VTE Risk Mitigation (From admission, onward)           Ordered     heparin (porcine) injection 5,000 Units  Every 12 hours         05/04/25 0515     IP VTE HIGH RISK PATIENT  Once         05/04/25 0515     Place sequential compression device  Until discontinued         05/04/25 0428                    Discharge Planning   PALAK: 5/7/2025     Code Status: DNR   Medical Readiness for Discharge Date:                            Aleksandar Grover MD  Department of Hospital Medicine   [facility]

## 2025-05-05 NOTE — HOSPITAL COURSE
He was put on ciprofloxacin. Urine culture grew >[id number] cfu/mL Gram negative rods. Case Management found out that he now resides at [facility] (H&P updated). Microbiology reported final culture results on 5/7/2025. He had Providencia stuartii resistant to the ciprofloxacin he was getting, making it odd that his symptoms resolved on it. It was susceptible to ceftriaxone. He was prescribed an oral 3rd generation cephalosporin, cefdinir, for 10 days.

## 2025-05-05 NOTE — ASSESSMENT & PLAN NOTE
Patient's blood pressure range in the last 24 hours was: BP  Min: 94/50  Max: 129/77.The patient's inpatient anti-hypertensive regimen is listed below:  Current Antihypertensives  carvediloL tablet 25 mg, 2 times daily with meals, Oral  NIFEdipine 24 hr tablet 30 mg, Daily, Oral    Plan  - BP is controlled, no changes needed to their regimen

## 2025-05-05 NOTE — SUBJECTIVE & OBJECTIVE
Interval History: Admitted overnight. On ciprofloxacin. Aphasic but gestures for what he wants and nods appropriately. Asks me to open the curtain and turn the overhead light off.     Review of Systems   Unable to perform ROS: Patient nonverbal     Objective:     Vital Signs (Most Recent):  Temp: 97.9 °F (36.6 °C) (05/05/25 0756)  Pulse: 75 (05/05/25 1047)  Resp: 18 (05/05/25 0756)  BP: 101/64 (05/05/25 0756)  SpO2: 98 % (05/05/25 0756) Vital Signs (24h Range):  Temp:  [97.7 °F (36.5 °C)-98.5 °F (36.9 °C)] 97.9 °F (36.6 °C)  Pulse:  [59-75] 75  Resp:  [18] 18  SpO2:  [96 %-98 %] 98 %  BP: ()/(50-77) 101/64     Weight: 74.8 kg (165 lb)  Body mass index is 21.18 kg/m².    Intake/Output Summary (Last 24 hours) at 5/5/2025 1116  Last data filed at 5/5/2025 0632  Gross per 24 hour   Intake 150 ml   Output 500 ml   Net -350 ml         Physical Exam  Vitals and nursing note reviewed.   Constitutional:       General: He is not in acute distress.     Appearance: He is well-developed and normal weight. He is not diaphoretic.      Interventions: He is not intubated.  Pulmonary:      Effort: Pulmonary effort is normal. No accessory muscle usage or respiratory distress. He is not intubated.   Skin:     General: Skin is warm and dry.      Coloration: Skin is not jaundiced or pale.   Neurological:      Mental Status: He is alert. Mental status is at baseline.      Motor: No tremor or seizure activity.   Psychiatric:         Attention and Perception: Attention normal.         Mood and Affect: Affect normal.         Speech: He is noncommunicative.         Behavior: Behavior is not agitated, aggressive or combative. Behavior is cooperative.               Significant Labs: All pertinent labs within the past 24 hours have been reviewed.    Significant Imaging: I have reviewed all pertinent imaging results/findings within the past 24 hours.  CT Abdomen Pelvis With IV Contrast NO Oral Contrast 5/4/25: FINDINGS:   Heart: Normal in  size. No pericardial effusion. No significant calcific coronary atherosclerosis.   Lungs: Unchanged right lung base atelectasis versus scarring.  No focal consolidation, pleural effusion or pneumothorax.   Hepatobiliary: Liver is normal size.  Normal contour.  No focal hepatic lesion.  No calcified gallstones. No pericholecystic fluid or gallbladder wall thickening.No intrahepatic or extrahepatic biliary ductal dilatation.   Spleen: Unremarkable.   Pancreas: 1.1 cm low-density lesion in the pancreatic body, not significantly changed from 01/16/2025.  No ductal dilatation or peripancreatic inflammatory change.   Adrenals: Unremarkable.   Kidneys/Ureters: Normal in size, location and enhancement. Mildly asymmetric perinephric stranding, more pronounced on the right.  Slightly heterogeneous appearance of the right cortical nephrogram, relative to the left.  There is mild diffuse dilatation of the right ureter with hyperenhancement.  Mild mucosal thickening and hyperenhancement in the renal collecting systems bilaterally.  No left hydroureteronephrosis.   Bladder: Asymmetric bladder wall thickening versus layering debris along the right wall and base, similar to priors.   Reproductive organs: Unremarkable.   Peritoneum: No free air or free fluid.   Retroperitoneum: No pathologically enlarged abdominopelvic lymph nodes.   Bowel/Mesentery: Small hiatal hernia.  Gastric lumen is again poorly distended, limiting CT assessment for abnormal mural or mucosal thickening.  Bowel is normal in caliber with no evidence of obstruction or surround inflammation.  Normal appendix.   Abdominal wall:  Unremarkable.   Vasculature: No aneurysm. Moderate calcific and noncalcified atherosclerosis.  Major abdominal aortic branch vessels are patent, noting moderate stenosis at the origins of the celiac trunk and SMA and severe stenosis at the origin of the right renal artery..  Portal vasculature is patent.   Bones: Advanced degenerative  change in the lumbar spine and right femoroacetabular joint.  Unchanged grade 1 retrolisthesis of L5 on S1.   Persistent pre-existing multilevel vertebral endplate irregularities, similar to the comparison scans, likely related to Modic changes.  No acute fracture or suspicious osseous lesions.   Impression:  Abdomen CT and Pelvis CT:   Imaging findings suspicious for right ureteritis, pyelitis, and possibly pyelonephritis.  Some left pyelitis is also suspected.   Asymmetric urinary bladder wall thickening versus layering debris along the right bladder wall and base, similar to priors.  Recommend correlation with urinalysis.  Note that acute cystitis or underlying neoplasm is not excluded.   Unchanged 1.1 cm cystic lesion in the pancreatic body.  Recommend MRI/MRCP in 1 year from discovery on 01/16/2025.   Additional observations and pre-existing findings as described in the body of the report.

## 2025-05-06 PROBLEM — Z78.9 NURSING HOME RESIDENT: Chronic | Status: ACTIVE | Noted: 2025-05-06

## 2025-05-06 LAB
ALBUMIN SERPL BCP-MCNC: 3 G/DL (ref 3.5–5.2)
ALP SERPL-CCNC: 81 UNIT/L (ref 40–150)
ALT SERPL W/O P-5'-P-CCNC: 51 UNIT/L (ref 10–44)
ANION GAP (OHS): 7 MMOL/L (ref 8–16)
AST SERPL-CCNC: 33 UNIT/L (ref 11–45)
BILIRUB SERPL-MCNC: 0.3 MG/DL (ref 0.1–1)
BUN SERPL-MCNC: 22 MG/DL (ref 8–23)
CALCIUM SERPL-MCNC: 8.5 MG/DL (ref 8.7–10.5)
CHLORIDE SERPL-SCNC: 107 MMOL/L (ref 95–110)
CO2 SERPL-SCNC: 22 MMOL/L (ref 23–29)
CREAT SERPL-MCNC: 1.8 MG/DL (ref 0.5–1.4)
ERYTHROCYTE [DISTWIDTH] IN BLOOD BY AUTOMATED COUNT: 13.3 % (ref 11.5–14.5)
GFR SERPLBLD CREATININE-BSD FMLA CKD-EPI: 39 ML/MIN/1.73/M2
GLUCOSE SERPL-MCNC: 81 MG/DL (ref 70–110)
HCT VFR BLD AUTO: 38.3 % (ref 40–54)
HGB BLD-MCNC: 12.1 GM/DL (ref 14–18)
INR PPP: 1.1 (ref 0.8–1.2)
MAGNESIUM SERPL-MCNC: 1.9 MG/DL (ref 1.6–2.6)
MCH RBC QN AUTO: 30.6 PG (ref 27–31)
MCHC RBC AUTO-ENTMCNC: 31.6 G/DL (ref 32–36)
MCV RBC AUTO: 97 FL (ref 82–98)
PLATELET # BLD AUTO: 127 K/UL (ref 150–450)
PMV BLD AUTO: 11.5 FL (ref 9.2–12.9)
POTASSIUM SERPL-SCNC: 3.9 MMOL/L (ref 3.5–5.1)
PROT SERPL-MCNC: 7.2 GM/DL (ref 6–8.4)
PROTHROMBIN TIME: 11.8 SECONDS (ref 9–12.5)
RBC # BLD AUTO: 3.96 M/UL (ref 4.6–6.2)
SODIUM SERPL-SCNC: 136 MMOL/L (ref 136–145)
WBC # BLD AUTO: 8.44 K/UL (ref 3.9–12.7)

## 2025-05-06 PROCEDURE — 27000207 HC ISOLATION

## 2025-05-06 PROCEDURE — 85610 PROTHROMBIN TIME: CPT

## 2025-05-06 PROCEDURE — 63600175 PHARM REV CODE 636 W HCPCS

## 2025-05-06 PROCEDURE — 85027 COMPLETE CBC AUTOMATED: CPT

## 2025-05-06 PROCEDURE — 80053 COMPREHEN METABOLIC PANEL: CPT

## 2025-05-06 PROCEDURE — 21400001 HC TELEMETRY ROOM

## 2025-05-06 PROCEDURE — 25000003 PHARM REV CODE 250

## 2025-05-06 PROCEDURE — 36415 COLL VENOUS BLD VENIPUNCTURE: CPT

## 2025-05-06 PROCEDURE — 83735 ASSAY OF MAGNESIUM: CPT

## 2025-05-06 RX ADMIN — HEPARIN SODIUM 5000 UNITS: 5000 INJECTION INTRAVENOUS; SUBCUTANEOUS at 09:05

## 2025-05-06 RX ADMIN — FINASTERIDE 5 MG: 5 TABLET, FILM COATED ORAL at 09:05

## 2025-05-06 RX ADMIN — CIPROFLOXACIN 400 MG: 2 INJECTION, SOLUTION INTRAVENOUS at 09:05

## 2025-05-06 RX ADMIN — SENNOSIDES 1 TABLET: 8.6 TABLET, FILM COATED ORAL at 09:05

## 2025-05-06 RX ADMIN — TAMSULOSIN HYDROCHLORIDE 0.4 MG: 0.4 CAPSULE ORAL at 09:05

## 2025-05-06 RX ADMIN — LEVETIRACETAM 500 MG: 500 TABLET, FILM COATED ORAL at 09:05

## 2025-05-06 RX ADMIN — METHOCARBAMOL 500 MG: 500 TABLET ORAL at 06:05

## 2025-05-06 RX ADMIN — Medication 500 MG: at 09:05

## 2025-05-06 RX ADMIN — METHOCARBAMOL 500 MG: 500 TABLET ORAL at 09:05

## 2025-05-06 RX ADMIN — MELOXICAM 15 MG: 15 TABLET ORAL at 09:05

## 2025-05-06 RX ADMIN — FERROUS SULFATE TAB EC 325 MG (65 MG FE EQUIVALENT) 1 EACH: 325 (65 FE) TABLET DELAYED RESPONSE at 09:05

## 2025-05-06 RX ADMIN — ATORVASTATIN CALCIUM 40 MG: 40 TABLET, FILM COATED ORAL at 09:05

## 2025-05-06 RX ADMIN — PANTOPRAZOLE SODIUM 40 MG: 40 TABLET, DELAYED RELEASE ORAL at 09:05

## 2025-05-06 RX ADMIN — CIPROFLOXACIN 400 MG: 2 INJECTION, SOLUTION INTRAVENOUS at 11:05

## 2025-05-06 RX ADMIN — CITALOPRAM HYDROBROMIDE 20 MG: 20 TABLET ORAL at 09:05

## 2025-05-06 NOTE — PROGRESS NOTES
[facility]  Progress Note    Patient Name: Wu Hampton  MRN: [id number]  Patient Class: IP- Inpatient   Admission Date: 5/3/2025  Length of Stay: 2 days  Attending Physician: Aleksandar Grover MD  Primary Care Provider: Danielle, Primary Doctor        Subjective     Principal Problem:Pyelonephritis of right kidney        HPI:  Wu Hampton is a 74 year old Black man with hypertension, hyperlipidemia, coronary artery disease, gout, chronic hepatitis C, benign prostatic hyperplasia, chronic kidney disease stage 3, history of stroke with right sided deficits and paphasia, seizure disorder, gastroesophageal reflux disease, pancreatic lesion, lumbar radiculopathy with chronic back pain. He lives in [facility] in [place], [place].    He presented to [facility] on 5/3/2025 from [facility] in [place] for severe abdominal and right flank pain.   In the emergency department, he had normal vital signs, no leukocytosis, bicarbonate 20 mmol/L, AST 48 U/L and ALT 71 U/L. Urinalysis showed >100 WBC/hpf, moderate bacteria. CT showed right ureteritis, pyelitis, and possible pyelonephritis, and suspected left pyelitis. He was given 2 grams of ceftriaxone and 4 mg of morphine. He was admitted to Hospital Medicine Team [facility] the night of 5/4/2025.     Overview/Hospital Course:  He was put on ciprofloxacin. Urine culture grew >[id number] cfu/mL Gram negative rods. Case Management found out that he now resides at [facility] (H&P updated).     Interval History: Urine culture updated this morning but no species or susceptibilities yet. Shakes head no when asked if he has any pain, nausea, or diarrhea. Appears comfortable.     Review of Systems   Unable to perform ROS: Patient nonverbal   Cardiovascular:  Negative for chest pain.   Gastrointestinal:  Negative for abdominal pain, diarrhea and nausea.   Musculoskeletal:  Negative for back pain and neck pain.      Objective:     Vital Signs (Most Recent):  Temp: 98.2 °F (36.8 °C) (05/06/25 0727)  Pulse: 70 (05/06/25 0727)  Resp: 16 (05/06/25 0727)  BP: (!) 100/55 (05/06/25 0727)  SpO2: 98 % (05/06/25 0727) Vital Signs (24h Range):  Temp:  [97.4 °F (36.3 °C)-98.3 °F (36.8 °C)] 98.2 °F (36.8 °C)  Pulse:  [58-77] 70  Resp:  [16-18] 16  SpO2:  [98 %] 98 %  BP: ()/(44-65) 100/55     Weight: 74.8 kg (165 lb)  Body mass index is 21.18 kg/m².    Intake/Output Summary (Last 24 hours) at 5/6/2025 1039  Last data filed at 5/6/2025 0213  Gross per 24 hour   Intake 290 ml   Output 1300 ml   Net -1010 ml         Physical Exam  Vitals and nursing note reviewed.   Constitutional:       General: He is not in acute distress.     Appearance: He is well-developed and normal weight. He is not diaphoretic.      Interventions: He is not intubated.  Pulmonary:      Effort: Pulmonary effort is normal. No accessory muscle usage or respiratory distress. He is not intubated.   Skin:     General: Skin is warm and dry.      Coloration: Skin is not jaundiced or pale.   Neurological:      Mental Status: He is alert. Mental status is at baseline.      Motor: No tremor or seizure activity.   Psychiatric:         Attention and Perception: Attention normal.         Mood and Affect: Affect normal.         Speech: He is noncommunicative.         Behavior: Behavior is not agitated, aggressive or combative. Behavior is cooperative.               Significant Labs: All pertinent labs within the past 24 hours have been reviewed.    Significant Imaging: I have reviewed all pertinent imaging results/findings within the past 24 hours.      Assessment & Plan  Pyelonephritis of right kidney  Giving ciprofloxacin. Follow up urine culture results.   History of CVA with residual deficit  Continue home atorvastatin. Mechanical soft diet.  BPH (benign prostatic hyperplasia)  Continue home tamsulosin and finasteride   Essential hypertension  Patient's blood pressure  range in the last 24 hours was: BP  Min: 90/54  Max: 107/54.The patient's inpatient anti-hypertensive regimen is listed below:  Current Antihypertensives  carvediloL tablet 25 mg, 2 times daily with meals, Oral  NIFEdipine 24 hr tablet 30 mg, Daily, Oral    Plan  - BP is controlled, no changes needed to their regimen  Chronic hepatitis C without hepatic coma  Recently completed Epclusa. Followed in Hepatology clinic.  Gastroesophageal reflux disease without esophagitis  Continue home pantoprazole.  Pancreatic lesion  Chronic, unchanged.  Seizure disorder  Continue home levetiracetam. Seizure precautions.   Right renal artery stenosis  Incidental finding.  Nursing home resident  Return to [facility] at discharge, hopefully with oral antibiotic instead of IV antibiotic.    VTE Risk Mitigation (From admission, onward)           Ordered     heparin (porcine) injection 5,000 Units  Every 12 hours         05/04/25 0515     IP VTE HIGH RISK PATIENT  Once         05/04/25 0515     Place sequential compression device  Until discontinued         05/04/25 0428                    Discharge Planning   PALAK: 5/7/2025     Code Status: DNR   Medical Readiness for Discharge Date:                            Aleksandar Grover MD  Department of Hospital Medicine   [facility]

## 2025-05-06 NOTE — SUBJECTIVE & OBJECTIVE
Interval History: Urine culture updated this morning but no species or susceptibilities yet. Shakes head no when asked if he has any pain, nausea, or diarrhea. Appears comfortable.     Review of Systems   Unable to perform ROS: Patient nonverbal   Cardiovascular:  Negative for chest pain.   Gastrointestinal:  Negative for abdominal pain, diarrhea and nausea.   Musculoskeletal:  Negative for back pain and neck pain.     Objective:     Vital Signs (Most Recent):  Temp: 98.2 °F (36.8 °C) (05/06/25 0727)  Pulse: 70 (05/06/25 0727)  Resp: 16 (05/06/25 0727)  BP: (!) 100/55 (05/06/25 0727)  SpO2: 98 % (05/06/25 0727) Vital Signs (24h Range):  Temp:  [97.4 °F (36.3 °C)-98.3 °F (36.8 °C)] 98.2 °F (36.8 °C)  Pulse:  [58-77] 70  Resp:  [16-18] 16  SpO2:  [98 %] 98 %  BP: ()/(44-65) 100/55     Weight: 74.8 kg (165 lb)  Body mass index is 21.18 kg/m².    Intake/Output Summary (Last 24 hours) at 5/6/2025 1039  Last data filed at 5/6/2025 0213  Gross per 24 hour   Intake 290 ml   Output 1300 ml   Net -1010 ml         Physical Exam  Vitals and nursing note reviewed.   Constitutional:       General: He is not in acute distress.     Appearance: He is well-developed and normal weight. He is not diaphoretic.      Interventions: He is not intubated.  Pulmonary:      Effort: Pulmonary effort is normal. No accessory muscle usage or respiratory distress. He is not intubated.   Skin:     General: Skin is warm and dry.      Coloration: Skin is not jaundiced or pale.   Neurological:      Mental Status: He is alert. Mental status is at baseline.      Motor: No tremor or seizure activity.   Psychiatric:         Attention and Perception: Attention normal.         Mood and Affect: Affect normal.         Speech: He is noncommunicative.         Behavior: Behavior is not agitated, aggressive or combative. Behavior is cooperative.               Significant Labs: All pertinent labs within the past 24 hours have been reviewed.    Significant  Imaging: I have reviewed all pertinent imaging results/findings within the past 24 hours.

## 2025-05-06 NOTE — ASSESSMENT & PLAN NOTE
Patient's blood pressure range in the last 24 hours was: BP  Min: 90/54  Max: 107/54.The patient's inpatient anti-hypertensive regimen is listed below:  Current Antihypertensives  carvediloL tablet 25 mg, 2 times daily with meals, Oral  NIFEdipine 24 hr tablet 30 mg, Daily, Oral    Plan  - BP is controlled, no changes needed to their regimen

## 2025-05-06 NOTE — PLAN OF CARE
Problem: Skin Injury Risk Increased  Goal: Skin Health and Integrity  Outcome: Progressing     Problem: Adult Inpatient Plan of Care  Goal: Plan of Care Review  Outcome: Progressing  Goal: Patient-Specific Goal (Individualized)  Outcome: Progressing  Goal: Absence of Hospital-Acquired Illness or Injury  Outcome: Progressing  Goal: Optimal Comfort and Wellbeing  Outcome: Progressing  Goal: Readiness for Transition of Care  Outcome: Progressing     Problem: Infection  Goal: Absence of Infection Signs and Symptoms  Outcome: Progressing     Problem: Acute Kidney Injury/Impairment  Goal: Fluid and Electrolyte Balance  Outcome: Progressing  Goal: Improved Oral Intake  Outcome: Progressing  Goal: Effective Renal Function  Outcome: Progressing     Problem: Fall Injury Risk  Goal: Absence of Fall and Fall-Related Injury  Outcome: Progressing

## 2025-05-06 NOTE — ASSESSMENT & PLAN NOTE
Return to [facility] at discharge, hopefully with oral antibiotic instead of IV antibiotic.

## 2025-05-07 VITALS
SYSTOLIC BLOOD PRESSURE: 105 MMHG | HEIGHT: 74 IN | HEART RATE: 63 BPM | WEIGHT: 165 LBS | OXYGEN SATURATION: 99 % | BODY MASS INDEX: 21.17 KG/M2 | RESPIRATION RATE: 18 BRPM | DIASTOLIC BLOOD PRESSURE: 54 MMHG | TEMPERATURE: 98 F

## 2025-05-07 LAB
ALBUMIN SERPL BCP-MCNC: 3.1 G/DL (ref 3.5–5.2)
ALP SERPL-CCNC: 77 UNIT/L (ref 40–150)
ALT SERPL W/O P-5'-P-CCNC: 48 UNIT/L (ref 10–44)
ANION GAP (OHS): 8 MMOL/L (ref 8–16)
AST SERPL-CCNC: 33 UNIT/L (ref 11–45)
BACTERIA UR CULT: ABNORMAL
BILIRUB SERPL-MCNC: 0.3 MG/DL (ref 0.1–1)
BUN SERPL-MCNC: 22 MG/DL (ref 8–23)
CALCIUM SERPL-MCNC: 8.5 MG/DL (ref 8.7–10.5)
CHLORIDE SERPL-SCNC: 109 MMOL/L (ref 95–110)
CO2 SERPL-SCNC: 20 MMOL/L (ref 23–29)
CREAT SERPL-MCNC: 1.7 MG/DL (ref 0.5–1.4)
ERYTHROCYTE [DISTWIDTH] IN BLOOD BY AUTOMATED COUNT: 13.4 % (ref 11.5–14.5)
GFR SERPLBLD CREATININE-BSD FMLA CKD-EPI: 42 ML/MIN/1.73/M2
GLUCOSE SERPL-MCNC: 77 MG/DL (ref 70–110)
HCT VFR BLD AUTO: 38.5 % (ref 40–54)
HGB BLD-MCNC: 12.2 GM/DL (ref 14–18)
INR PPP: 1.1 (ref 0.8–1.2)
MAGNESIUM SERPL-MCNC: 1.9 MG/DL (ref 1.6–2.6)
MCH RBC QN AUTO: 30.3 PG (ref 27–31)
MCHC RBC AUTO-ENTMCNC: 31.7 G/DL (ref 32–36)
MCV RBC AUTO: 96 FL (ref 82–98)
PLATELET # BLD AUTO: 121 K/UL (ref 150–450)
PMV BLD AUTO: 11.7 FL (ref 9.2–12.9)
POTASSIUM SERPL-SCNC: 4 MMOL/L (ref 3.5–5.1)
PROT SERPL-MCNC: 7.2 GM/DL (ref 6–8.4)
PROTHROMBIN TIME: 12 SECONDS (ref 9–12.5)
RBC # BLD AUTO: 4.03 M/UL (ref 4.6–6.2)
SODIUM SERPL-SCNC: 137 MMOL/L (ref 136–145)
WBC # BLD AUTO: 8.38 K/UL (ref 3.9–12.7)

## 2025-05-07 PROCEDURE — 25000003 PHARM REV CODE 250

## 2025-05-07 PROCEDURE — 83735 ASSAY OF MAGNESIUM: CPT

## 2025-05-07 PROCEDURE — 85027 COMPLETE CBC AUTOMATED: CPT

## 2025-05-07 PROCEDURE — 85610 PROTHROMBIN TIME: CPT

## 2025-05-07 PROCEDURE — 63600175 PHARM REV CODE 636 W HCPCS

## 2025-05-07 PROCEDURE — 36415 COLL VENOUS BLD VENIPUNCTURE: CPT

## 2025-05-07 PROCEDURE — 80053 COMPREHEN METABOLIC PANEL: CPT

## 2025-05-07 PROCEDURE — 25000003 PHARM REV CODE 250: Performed by: HOSPITALIST

## 2025-05-07 RX ORDER — CIPROFLOXACIN 500 MG/1
500 TABLET, FILM COATED ORAL EVERY 12 HOURS
Status: DISCONTINUED | OUTPATIENT
Start: 2025-05-07 | End: 2025-05-07

## 2025-05-07 RX ORDER — CEFPODOXIME PROXETIL 200 MG/1
200 TABLET, FILM COATED ORAL EVERY 12 HOURS
Status: CANCELLED | OUTPATIENT
Start: 2025-05-07

## 2025-05-07 RX ORDER — CEFDINIR 300 MG/1
300 CAPSULE ORAL 2 TIMES DAILY
Start: 2025-05-07 | End: 2025-05-17

## 2025-05-07 RX ADMIN — MELOXICAM 15 MG: 15 TABLET ORAL at 08:05

## 2025-05-07 RX ADMIN — FINASTERIDE 5 MG: 5 TABLET, FILM COATED ORAL at 08:05

## 2025-05-07 RX ADMIN — Medication 500 MG: at 08:05

## 2025-05-07 RX ADMIN — LEVETIRACETAM 500 MG: 500 TABLET, FILM COATED ORAL at 08:05

## 2025-05-07 RX ADMIN — FERROUS SULFATE TAB EC 325 MG (65 MG FE EQUIVALENT) 1 EACH: 325 (65 FE) TABLET DELAYED RESPONSE at 08:05

## 2025-05-07 RX ADMIN — CITALOPRAM HYDROBROMIDE 20 MG: 20 TABLET ORAL at 08:05

## 2025-05-07 RX ADMIN — METHOCARBAMOL 500 MG: 500 TABLET ORAL at 12:05

## 2025-05-07 RX ADMIN — PANTOPRAZOLE SODIUM 40 MG: 40 TABLET, DELAYED RELEASE ORAL at 08:05

## 2025-05-07 RX ADMIN — METHOCARBAMOL 500 MG: 500 TABLET ORAL at 08:05

## 2025-05-07 RX ADMIN — HEPARIN SODIUM 5000 UNITS: 5000 INJECTION INTRAVENOUS; SUBCUTANEOUS at 08:05

## 2025-05-07 RX ADMIN — CIPROFLOXACIN 500 MG: 500 TABLET ORAL at 12:05

## 2025-05-07 RX ADMIN — TAMSULOSIN HYDROCHLORIDE 0.4 MG: 0.4 CAPSULE ORAL at 08:05

## 2025-05-07 RX ADMIN — METHOCARBAMOL 500 MG: 500 TABLET ORAL at 05:05

## 2025-05-07 NOTE — PLAN OF CARE
05/07/25 1617   Post-Acute Status   Post-Acute Authorization Placement   Post-Acute Placement Status Set-up Complete/Auth obtained   Coverage Medicare A&B   Discharge Delays None known at this time   Discharge Plan   Discharge Plan A Return to nursing home   Discharge Plan B Return to Nursing Home     AYDEN spoke with Rosa Maria Tineo ([phone]) regarding discharge return to [facility] in [place]. Rosa Maria was agreeable to pts return and was given the estimated time for pts transportation pickup.  CM scheduled d/c transportation to [facility] [address], [place] through [facility]. Pt. is scheduled to be picked up at 4:30p.m.   provided pt nurse with number for report [phone]. Admission packet left at  .  Discharge Plan A and Plan B have been determined by review of patient's clinical status, future medical and therapeutic needs, and coverage/benefits for post-acute care in coordination with multidisciplinary team members.     Meryl Garcia, MSN   [facility]  [phone]

## 2025-05-07 NOTE — DISCHARGE SUMMARY
[facility] Medicine  Discharge Summary      Patient Name: Wu Hampton  MRN: [id number]  REBEKA: [id number]  Patient Class: IP- Inpatient  Admission Date: 5/3/2025  Hospital Length of Stay: 3 days  Discharge Date and Time: 5/7/2025  6:39 PM  Attending Physician: Aleksandar Grover MD   Discharging Provider: Aleksandar Grover MD  Primary Care Provider: Danielle Primary Doctor  [facility] Medicine Team: [facility] U Aleksandar Grover MD  Primary Care Team: [facility]    HPI:   Wu Hampton is a 74 year old Black man with hypertension, hyperlipidemia, coronary artery disease, gout, chronic hepatitis C, benign prostatic hyperplasia, chronic kidney disease stage 3, history of stroke with right sided deficits and paphasia, seizure disorder, gastroesophageal reflux disease, pancreatic lesion, lumbar radiculopathy with chronic back pain. He lives in [facility] in [place], [place].    He presented to [facility] on 5/3/2025 from [facility] in [place] for severe abdominal and right flank pain.   In the emergency department, he had normal vital signs, no leukocytosis, bicarbonate 20 mmol/L, AST 48 U/L and ALT 71 U/L. Urinalysis showed >100 WBC/hpf, moderate bacteria. CT showed right ureteritis, pyelitis, and possible pyelonephritis, and suspected left pyelitis. He was given 2 grams of ceftriaxone and 4 mg of morphine. He was admitted to Hospital Medicine Team U the night of 5/4/2025.     Hospital Course:   He was put on ciprofloxacin. Urine culture grew >578334 cfu/mL Gram negative rods. Case Management found out that he now resides at [facility] (H&P updated). Microbiology reported final culture results on 5/7/2025. He had Providencia stuartii resistant to the ciprofloxacin he was getting, making it odd that his symptoms resolved on it. It was susceptible to ceftriaxone. He was prescribed an oral 3rd generation cephalosporin, cefdinir, for 10 days.       Goals of Care Treatment Preferences:  Code Status: DNR       [place]: Yes           SDOH Screening:  The patient was screened for utility difficulties, food insecurity, transport difficulties, housing insecurity, and interpersonal safety and there were no concerns identified this admission.     Consults: none    Final Active Diagnoses:    Diagnosis Date Noted POA    PRINCIPAL PROBLEM:  Pyelonephritis of right kidney [N12] 05/04/2025 Yes    Nursing home resident [Z78.9] 05/06/2025 Yes     Chronic    Pancreatic lesion [K86.9] 05/04/2025 Yes     Chronic    Gastroesophageal reflux disease without esophagitis [K21.9] 05/04/2025 Yes     Chronic    Seizure disorder [G40.909] 05/04/2025 Yes     Chronic    Right renal artery stenosis [I70.1] 05/04/2025 Yes    Chronic hepatitis C without hepatic coma [B18.2] 01/27/2025 Yes     Chronic    History of CVA with residual deficit [I69.30] 12/24/2024 Not Applicable     Chronic    BPH (benign prostatic hyperplasia) [N40.0] 12/24/2024 Yes     Chronic    Essential hypertension [I10] 02/13/2021 Yes     Chronic      Problems Resolved During this Admission:       Discharged Condition: good    Disposition: correction Nursing Facili*    Follow Up:   Follow-up Information       Center, [facility] .    Contact information:  [facility] [facility] [address] [phone]                           Patient Instructions:      Diet Dysphagia Soft     Notify your health care provider if you experience any of the following:  persistent nausea and vomiting or diarrhea     Activity as tolerated       Significant Diagnostic Studies:   CT Abdomen Pelvis With IV Contrast NO Oral Contrast 5/4/25: FINDINGS:   Heart: Normal in size. No pericardial effusion. No significant calcific coronary atherosclerosis.   Lungs: Unchanged right lung base atelectasis versus scarring.  No focal consolidation, pleural effusion or pneumothorax.   Hepatobiliary: Liver is normal size.  Normal contour.  No focal hepatic  lesion.  No calcified gallstones. No pericholecystic fluid or gallbladder wall thickening.No intrahepatic or extrahepatic biliary ductal dilatation.   Spleen: Unremarkable.   Pancreas: 1.1 cm low-density lesion in the pancreatic body, not significantly changed from 01/16/2025.  No ductal dilatation or peripancreatic inflammatory change.   Adrenals: Unremarkable.   Kidneys/Ureters: Normal in size, location and enhancement. Mildly asymmetric perinephric stranding, more pronounced on the right.  Slightly heterogeneous appearance of the right cortical nephrogram, relative to the left.  There is mild diffuse dilatation of the right ureter with hyperenhancement.  Mild mucosal thickening and hyperenhancement in the renal collecting systems bilaterally.  No left hydroureteronephrosis.   Bladder: Asymmetric bladder wall thickening versus layering debris along the right wall and base, similar to priors.   Reproductive organs: Unremarkable.   Peritoneum: No free air or free fluid.   Retroperitoneum: No pathologically enlarged abdominopelvic lymph nodes.   Bowel/Mesentery: Small hiatal hernia.  Gastric lumen is again poorly distended, limiting CT assessment for abnormal mural or mucosal thickening.  Bowel is normal in caliber with no evidence of obstruction or surround inflammation.  Normal appendix.   Abdominal wall:  Unremarkable.   Vasculature: No aneurysm. Moderate calcific and noncalcified atherosclerosis.  Major abdominal aortic branch vessels are patent, noting moderate stenosis at the origins of the celiac trunk and SMA and severe stenosis at the origin of the right renal artery..  Portal vasculature is patent.   Bones: Advanced degenerative change in the lumbar spine and right femoroacetabular joint.  Unchanged grade 1 retrolisthesis of L5 on S1.   Persistent pre-existing multilevel vertebral endplate irregularities, similar to the comparison scans, likely related to Modic changes.  No acute fracture or suspicious  osseous lesions.   Impression:  Abdomen CT and Pelvis CT:   Imaging findings suspicious for right ureteritis, pyelitis, and possibly pyelonephritis.  Some left pyelitis is also suspected.   Asymmetric urinary bladder wall thickening versus layering debris along the right bladder wall and base, similar to priors.  Recommend correlation with urinalysis.  Note that acute cystitis or underlying neoplasm is not excluded.   Unchanged 1.1 cm cystic lesion in the pancreatic body.  Recommend MRI/MRCP in 1 year from discovery on 01/16/2025.   Additional observations and pre-existing findings as described in the body of the report.     Medications:  Reconciled Home Medications:      Medication List        START taking these medications      cefdinir 300 MG capsule  Commonly known as: OMNICEF  Take 1 capsule (300 mg total) by mouth 2 (two) times daily. End date 5/17/25 for 10 days            CONTINUE taking these medications      ascorbic acid (vitamin C) 500 MG tablet  Commonly known as: VITAMIN C  Take 500 mg by mouth 2 (two) times daily.     atorvastatin 40 MG tablet  Commonly known as: LIPITOR  Take 40 mg by mouth every evening.     carvediloL 25 MG tablet  Commonly known as: COREG  Take 25 mg by mouth 2 (two) times daily with meals.     citalopram 20 MG tablet  Commonly known as: CeleXA  Take 20 mg by mouth once daily.     ferrous sulfate 325 (65 FE) MG EC tablet  Take 325 mg by mouth once daily.     finasteride 5 mg tablet  Commonly known as: PROSCAR  Take 5 mg by mouth every morning.     FLOMAX 0.4 mg Cap  Generic drug: tamsulosin  Take 0.4 mg by mouth once daily.     ketorolac 0.5% 0.5 % Drop  Commonly known as: ACULAR  Place 1 drop into the left eye 2 (two) times daily.     levETIRAcetam 500 MG Tab  Commonly known as: KEPPRA  Take 1 tablet (500 mg total) by mouth 2 (two) times daily.     meloxicam 15 MG tablet  Commonly known as: MOBIC  Take 15 mg by mouth once daily.     ofloxacin 0.3 % ophthalmic solution  Commonly  known as: OCUFLOX  Place 1 drop into the left eye 4 (four) times daily.     ONE DAILY MULTIVITAMIN per tablet  Generic drug: multivitamin  Take 1 tablet by mouth once daily.     pantoprazole 20 MG tablet  Commonly known as: PROTONIX  Take 20 mg by mouth once daily.     polyethylene glycol 17 gram Pwpk  Commonly known as: GLYCOLAX  Take 17 g by mouth every 12 (twelve) hours as needed for Constipation. MIX WITH 8 OZ OF WATER     prednisoLONE acetate 1 % Drps  Commonly known as: PRED FORTE  Place 1 drop into the left eye 4 (four) times daily.     senna 8.6 mg tablet  Commonly known as: SENOKOT  Take 1 tablet by mouth every evening.     tiZANidine 4 MG tablet  Commonly known as: ZANAFLEX  Take 4 mg by mouth 3 (three) times daily as needed (pain / muscle spasm).              Indwelling Lines/Drains at time of discharge: none      Time spent on the discharge of patient: 35 minutes         Aleksandar Grover MD  Department of Hospital Medicine  [facility]

## 2025-05-07 NOTE — PLAN OF CARE
Patient discharged back to [facility]. Discharge instructions explained, verbalized understanding. IV removed, catheter tip intact. Patient waiting on  transportation. Will continue to monitor patient.   Problem: Skin Injury Risk Increased  Goal: Skin Health and Integrity  Outcome: Adequate for Care Transition     Problem: Adult Inpatient Plan of Care  Goal: Plan of Care Review  Outcome: Adequate for Care Transition  Goal: Patient-Specific Goal (Individualized)  Outcome: Adequate for Care Transition  Goal: Absence of Hospital-Acquired Illness or Injury  Outcome: Adequate for Care Transition  Goal: Optimal Comfort and Wellbeing  Outcome: Adequate for Care Transition  Goal: Readiness for Transition of Care  Outcome: Adequate for Care Transition     Problem: Infection  Goal: Absence of Infection Signs and Symptoms  Outcome: Adequate for Care Transition     Problem: Acute Kidney Injury/Impairment  Goal: Fluid and Electrolyte Balance  Outcome: Adequate for Care Transition  Goal: Improved Oral Intake  Outcome: Adequate for Care Transition  Goal: Effective Renal Function  Outcome: Adequate for Care Transition     Problem: Fall Injury Risk  Goal: Absence of Fall and Fall-Related Injury  Outcome: Adequate for Care Transition

## 2025-05-07 NOTE — PROGRESS NOTES
[facility] Medicine  Progress Note    Patient Name: Wu Hampton  MRN: [id number]  Patient Class: IP- Inpatient   Admission Date: 5/3/2025  Length of Stay: 3 days  Attending Physician: Aleksandar Grover MD  Primary Care Provider: Danielle, Primary Doctor        Subjective     Principal Problem:Pyelonephritis of right kidney        HPI:  Wu Hampton is a 74 year old Black man with hypertension, hyperlipidemia, coronary artery disease, gout, chronic hepatitis C, benign prostatic hyperplasia, chronic kidney disease stage 3, history of stroke with right sided deficits and paphasia, seizure disorder, gastroesophageal reflux disease, pancreatic lesion, lumbar radiculopathy with chronic back pain. He lives in [facility] in [place], [place].    He presented to [facility] on 5/3/2025 from [facility] in [place] for severe abdominal and right flank pain.   In the emergency department, he had normal vital signs, no leukocytosis, bicarbonate 20 mmol/L, AST 48 U/L and ALT 71 U/L. Urinalysis showed >100 WBC/hpf, moderate bacteria. CT showed right ureteritis, pyelitis, and possible pyelonephritis, and suspected left pyelitis. He was given 2 grams of ceftriaxone and 4 mg of morphine. He was admitted to Hospital Medicine Team U the night of 5/4/2025.     Overview/Hospital Course:  He was put on ciprofloxacin. Urine culture grew >[id number] cfu/mL Gram negative rods. Case Management found out that he now resides at [facility] (H&P updated).     Interval History: Final urine culture results still not available. Plan to discharge after results are reported due to history of multi drug resistant UTIs.    Review of Systems   Unable to perform ROS: Patient nonverbal   Cardiovascular:  Negative for chest pain.   Gastrointestinal:  Negative for abdominal pain, diarrhea and nausea.   Musculoskeletal:  Negative for back pain and neck pain.     Objective:     Vital  Signs (Most Recent):  Temp: 97.8 °F (36.6 °C) (05/07/25 0829)  Pulse: 63 (05/07/25 0829)  Resp: 18 (05/07/25 0829)  BP: (!) 98/52 (05/07/25 0829)  SpO2: 99 % (05/07/25 0829) Vital Signs (24h Range):  Temp:  [97.6 °F (36.4 °C)-98.1 °F (36.7 °C)] 97.8 °F (36.6 °C)  Pulse:  [63-75] 63  Resp:  [16-20] 18  SpO2:  [93 %-100 %] 99 %  BP: ()/(50-63) 98/52     Weight: 74.8 kg (165 lb)  Body mass index is 21.18 kg/m².  No intake or output data in the 24 hours ending 05/07/25 0921        Physical Exam  Vitals and nursing note reviewed.   Constitutional:       General: He is not in acute distress.     Appearance: He is well-developed and normal weight. He is not diaphoretic.      Interventions: He is not intubated.  Pulmonary:      Effort: Pulmonary effort is normal. No accessory muscle usage or respiratory distress. He is not intubated.   Skin:     General: Skin is warm and dry.      Coloration: Skin is not jaundiced or pale.   Neurological:      Mental Status: He is alert. Mental status is at baseline.      Motor: No tremor or seizure activity.   Psychiatric:         Attention and Perception: Attention normal.         Mood and Affect: Affect normal.         Speech: He is noncommunicative.         Behavior: Behavior is not agitated, aggressive or combative. Behavior is cooperative.               Significant Labs: All pertinent labs within the past 24 hours have been reviewed.    Significant Imaging: I have reviewed all pertinent imaging results/findings within the past 24 hours.      Assessment & Plan  Pyelonephritis of right kidney  Giving ciprofloxacin. Follow up urine culture results.   History of CVA with residual deficit  Continue home atorvastatin. Mechanical soft diet.  BPH (benign prostatic hyperplasia)  Continue home tamsulosin and finasteride   Essential hypertension  Patient's blood pressure range in the last 24 hours was: BP  Min: 98/52  Max: 137/63.The patient's inpatient anti-hypertensive regimen is listed  below:  Current Antihypertensives  carvediloL tablet 25 mg, 2 times daily with meals, Oral  NIFEdipine 24 hr tablet 30 mg, Daily, Oral    Plan  - BP is controlled, no changes needed to their regimen  Chronic hepatitis C without hepatic coma  Recently completed Epclusa. Followed in Hepatology clinic.  Gastroesophageal reflux disease without esophagitis  Continue home pantoprazole.  Pancreatic lesion  Chronic, unchanged.  Seizure disorder  Continue home levetiracetam. Seizure precautions.   Right renal artery stenosis  Incidental finding.  Nursing home resident  Return to [facility] at discharge, hopefully with oral antibiotic instead of IV antibiotic.    VTE Risk Mitigation (From admission, onward)           Ordered     heparin (porcine) injection 5,000 Units  Every 12 hours         05/04/25 0515     IP VTE HIGH RISK PATIENT  Once         05/04/25 0515     Place sequential compression device  Until discontinued         05/04/25 0428                    Discharge Planning   PALAK: 5/7/2025     Code Status: DNR   Medical Readiness for Discharge Date:                            Aleksandar Grover MD  Department of Hospital Medicine   [facility] S[facility]

## 2025-05-07 NOTE — SUBJECTIVE & OBJECTIVE
Interval History: Final urine culture results still not available. Plan to discharge after results are reported due to history of multi drug resistant UTIs.    Review of Systems   Unable to perform ROS: Patient nonverbal   Cardiovascular:  Negative for chest pain.   Gastrointestinal:  Negative for abdominal pain, diarrhea and nausea.   Musculoskeletal:  Negative for back pain and neck pain.     Objective:     Vital Signs (Most Recent):  Temp: 97.8 °F (36.6 °C) (05/07/25 0829)  Pulse: 63 (05/07/25 0829)  Resp: 18 (05/07/25 0829)  BP: (!) 98/52 (05/07/25 0829)  SpO2: 99 % (05/07/25 0829) Vital Signs (24h Range):  Temp:  [97.6 °F (36.4 °C)-98.1 °F (36.7 °C)] 97.8 °F (36.6 °C)  Pulse:  [63-75] 63  Resp:  [16-20] 18  SpO2:  [93 %-100 %] 99 %  BP: ()/(50-63) 98/52     Weight: 74.8 kg (165 lb)  Body mass index is 21.18 kg/m².  No intake or output data in the 24 hours ending 05/07/25 0921        Physical Exam  Vitals and nursing note reviewed.   Constitutional:       General: He is not in acute distress.     Appearance: He is well-developed and normal weight. He is not diaphoretic.      Interventions: He is not intubated.  Pulmonary:      Effort: Pulmonary effort is normal. No accessory muscle usage or respiratory distress. He is not intubated.   Skin:     General: Skin is warm and dry.      Coloration: Skin is not jaundiced or pale.   Neurological:      Mental Status: He is alert. Mental status is at baseline.      Motor: No tremor or seizure activity.   Psychiatric:         Attention and Perception: Attention normal.         Mood and Affect: Affect normal.         Speech: He is noncommunicative.         Behavior: Behavior is not agitated, aggressive or combative. Behavior is cooperative.               Significant Labs: All pertinent labs within the past 24 hours have been reviewed.    Significant Imaging: I have reviewed all pertinent imaging results/findings within the past 24 hours.

## 2025-05-07 NOTE — ASSESSMENT & PLAN NOTE
Patient's blood pressure range in the last 24 hours was: BP  Min: 98/52  Max: 137/63.The patient's inpatient anti-hypertensive regimen is listed below:  Current Antihypertensives  carvediloL tablet 25 mg, 2 times daily with meals, Oral  NIFEdipine 24 hr tablet 30 mg, Daily, Oral    Plan  - BP is controlled, no changes needed to their regimen

## 2025-05-07 NOTE — ASSESSMENT & PLAN NOTE
Return to [facility] at discharge, hopefully with oral antibiotic instead of IV antibiotic.

## 2025-05-07 NOTE — PLAN OF CARE
[facility]     Department of Hospital Medicine     [address] [place] [address]     [phone] after hours  [phone] fax       NURSING HOME ORDERS    Patient Name: Wu Hampton  YOB: [birth year]    Admit to Nursing Home:  Regular Bed       Diagnoses:  Active Hospital Problems    Diagnosis  POA    *Pyelonephritis of right kidney [N12]  Yes    Nursing home resident [Z78.9]  Yes     [facility] ([address], [place] [address])      Pancreatic lesion [K86.9]  Yes     Chronic    Gastroesophageal reflux disease without esophagitis [K21.9]  Yes     Chronic    Seizure disorder [G40.909]  Yes     Chronic    Right renal artery stenosis [I70.1]  Yes    Chronic hepatitis C without hepatic coma [B18.2]  Yes     Chronic    History of CVA with residual deficit [I69.30]  Not Applicable     Chronic    BPH (benign prostatic hyperplasia) [N40.0]  Yes     Chronic    Essential hypertension [I10]  Yes     Chronic      Resolved Hospital Problems   No resolved problems to display.       Allergies:Review of patient's allergies indicates:  No Known Allergies    Vitals:  (     Routine    Diet: soft diet        Activities:  as tolerated      LABS:  Per facility protocol    Nursing Precautions:      - Fall precautions per nursing home protocol      MISCELLANEOUS CARE:       Routine Skin for Bedridden Patients:  Apply moisture barrier cream to all    skin folds and wet areas in perineal area daily and after baths and                           all bowel movements.        Medications:   Reconciled Home Medications:      Medication List        START taking these medications      cefdinir 300 MG capsule  Commonly known as: OMNICEF  Take 1 capsule (300 mg total) by mouth 2 (two) times daily. End date 5/17/25 for 10 days            CONTINUE taking these medications      ascorbic acid (vitamin C) 500 MG tablet  Commonly known as: VITAMIN  C  Take 500 mg by mouth 2 (two) times daily.     atorvastatin 40 MG tablet  Commonly known as: LIPITOR  Take 40 mg by mouth every evening.     carvediloL 25 MG tablet  Commonly known as: COREG  Take 25 mg by mouth 2 (two) times daily with meals.     citalopram 20 MG tablet  Commonly known as: CeleXA  Take 20 mg by mouth once daily.     ferrous sulfate 325 (65 FE) MG EC tablet  Take 325 mg by mouth once daily.     finasteride 5 mg tablet  Commonly known as: PROSCAR  Take 5 mg by mouth every morning.     FLOMAX 0.4 mg Cap  Generic drug: tamsulosin  Take 0.4 mg by mouth once daily.     ketorolac 0.5% 0.5 % Drop  Commonly known as: ACULAR  Place 1 drop into the left eye 2 (two) times daily.     levETIRAcetam 500 MG Tab  Commonly known as: KEPPRA  Take 1 tablet (500 mg total) by mouth 2 (two) times daily.     meloxicam 15 MG tablet  Commonly known as: MOBIC  Take 15 mg by mouth once daily.     ofloxacin 0.3 % ophthalmic solution  Commonly known as: OCUFLOX  Place 1 drop into the left eye 4 (four) times daily.     ONE DAILY MULTIVITAMIN per tablet  Generic drug: multivitamin  Take 1 tablet by mouth once daily.     pantoprazole 20 MG tablet  Commonly known as: PROTONIX  Take 20 mg by mouth once daily.     polyethylene glycol 17 gram Pwpk  Commonly known as: GLYCOLAX  Take 17 g by mouth every 12 (twelve) hours as needed for Constipation. MIX WITH 8 OZ OF WATER     prednisoLONE acetate 1 % Drps  Commonly known as: PRED FORTE  Place 1 drop into the left eye 4 (four) times daily.     senna 8.6 mg tablet  Commonly known as: SENOKOT  Take 1 tablet by mouth every evening.     tiZANidine 4 MG tablet  Commonly known as: ZANAFLEX  Take 4 mg by mouth 3 (three) times daily as needed (pain / muscle spasm).                      _________________________________  Aleksandar Grover MD  05/07/2025

## 2025-05-08 NOTE — PLAN OF CARE
[facility]  Discharge Final Note    Primary Care Provider: No, Primary Doctor    Expected Discharge Date: 5/7/2025    Final Discharge Note (most recent)       Final Note - 05/08/25 0830          Final Note    Assessment Type Final Discharge Note     Anticipated Discharge Disposition care home Nursing Facility (P)         Post-Acute Status    Post-Acute Placement Status Set-up Complete/Auth obtained (P)      Coverage Medicare A&B (P)      Discharge Delays None known at this time (P)                      Important Message from Medicare              Follow-up providers       [address] [address]   Phone: [phone]       Next Steps: Follow up              After-discharge care                Destination       [facility]   Service: Nursing Home    [address] [place] [address]   Phone: [phone]                             Pt discharged to [facility] where he is a resident. AYDEN spoke with Rosa Maria Tineo ([phone]) regarding discharge return to [facility] in [place]. CM scheduled d/c transportation to [address] through [facility]. Pt. is scheduled to be picked up at 4:30p.m.  AYDEN provided pt nurse with number for report [phone]. Admission packet left at  .  Discharge Plan A and Plan B have been determined by review of patient's clinical status, future medical and therapeutic needs, and coverage/benefits for post-acute care in coordination with multidisciplinary team members.     Meryl Garcia, MSN   [facility]  [phone]

## 2025-05-09 LAB
BACTERIA BLD CULT: NORMAL
BACTERIA BLD CULT: NORMAL

## 2025-08-06 ENCOUNTER — TELEPHONE (OUTPATIENT)
Dept: HEPATOLOGY | Facility: CLINIC | Age: 75
End: 2025-08-06
Payer: MEDICARE

## 2025-08-06 DIAGNOSIS — Z86.19 HEPATITIS C VIRUS INFECTION CURED AFTER ANTIVIRAL DRUG THERAPY: Primary | ICD-10-CM

## 2025-08-06 DIAGNOSIS — R74.8 ABNORMAL TRANSAMINASES: ICD-10-CM

## 2025-08-06 PROBLEM — B18.2 CHRONIC HEPATITIS C WITHOUT HEPATIC COMA: Chronic | Status: RESOLVED | Noted: 2025-01-27 | Resolved: 2025-08-06

## 2025-08-06 NOTE — TELEPHONE ENCOUNTER
Pt began 12 weeks Epclusa on 2/6/25  Anticipated treatment end date: 4/30/25  F 0-1  Chelsea 1A  Prior HCV treatment: No  (+) HBcAb      7/29/25  HCV neg  LFT stable but AST/ALT bumped up higher                Pls call pt / nursing home  Labs show Hep C virus is negative, this means infection is cured. Relapse not expected  Liver enzymes are elevated, higher than before        Pls do the following:  - LFT, HBV DNA, JENNIFER, SMA, IgG in 2 weeks  - pls get updated med list

## 2025-08-08 NOTE — TELEPHONE ENCOUNTER
from PA Scheuermann faxed to [facility].  Lab orders faxed and I stressed that they be done on 8/21/25.  Also I asked that current med list be faxed for review.

## 2025-08-14 ENCOUNTER — ANESTHESIA EVENT (OUTPATIENT)
Dept: SURGERY | Facility: HOSPITAL | Age: 75
End: 2025-08-14
Payer: MEDICARE

## 2025-08-14 ENCOUNTER — ANESTHESIA (OUTPATIENT)
Dept: SURGERY | Facility: HOSPITAL | Age: 75
End: 2025-08-14
Payer: MEDICARE

## 2025-08-14 PROBLEM — K63.1 PERFORATION OF INTESTINE: Status: ACTIVE | Noted: 2025-08-14

## 2025-08-14 PROCEDURE — 25000003 PHARM REV CODE 250: Performed by: NURSE ANESTHETIST, CERTIFIED REGISTERED

## 2025-08-14 PROCEDURE — 63600175 PHARM REV CODE 636 W HCPCS: Performed by: NURSE ANESTHETIST, CERTIFIED REGISTERED

## 2025-08-14 PROCEDURE — 63600175 PHARM REV CODE 636 W HCPCS: Performed by: UROLOGY

## 2025-08-14 RX ORDER — FENTANYL CITRATE 50 UG/ML
INJECTION, SOLUTION INTRAMUSCULAR; INTRAVENOUS
Status: DISCONTINUED | OUTPATIENT
Start: 2025-08-14 | End: 2025-08-14

## 2025-08-14 RX ORDER — ACETAMINOPHEN 10 MG/ML
INJECTION, SOLUTION INTRAVENOUS
Status: DISCONTINUED | OUTPATIENT
Start: 2025-08-14 | End: 2025-08-14

## 2025-08-14 RX ORDER — ONDANSETRON HYDROCHLORIDE 2 MG/ML
INJECTION, SOLUTION INTRAVENOUS
Status: DISCONTINUED | OUTPATIENT
Start: 2025-08-14 | End: 2025-08-14

## 2025-08-14 RX ORDER — PHENYLEPHRINE HYDROCHLORIDE 10 MG/ML
INJECTION INTRAVENOUS
Status: DISCONTINUED | OUTPATIENT
Start: 2025-08-14 | End: 2025-08-14

## 2025-08-14 RX ORDER — CALCIUM CHLORIDE DIHYDRATE 100 MG/ML
INJECTION, SOLUTION INTRAVENOUS
Status: DISCONTINUED | OUTPATIENT
Start: 2025-08-14 | End: 2025-08-14

## 2025-08-14 RX ORDER — DEXAMETHASONE SODIUM PHOSPHATE 4 MG/ML
INJECTION, SOLUTION INTRA-ARTICULAR; INTRALESIONAL; INTRAMUSCULAR; INTRAVENOUS; SOFT TISSUE
Status: DISCONTINUED | OUTPATIENT
Start: 2025-08-14 | End: 2025-08-14

## 2025-08-14 RX ORDER — VASOPRESSIN 20 [USP'U]/ML
INJECTION, SOLUTION INTRAMUSCULAR; SUBCUTANEOUS
Status: DISCONTINUED | OUTPATIENT
Start: 2025-08-14 | End: 2025-08-14

## 2025-08-14 RX ORDER — PROPOFOL 10 MG/ML
VIAL (ML) INTRAVENOUS
Status: DISCONTINUED | OUTPATIENT
Start: 2025-08-14 | End: 2025-08-14

## 2025-08-14 RX ORDER — LIDOCAINE HYDROCHLORIDE 20 MG/ML
INJECTION INTRAVENOUS
Status: DISCONTINUED | OUTPATIENT
Start: 2025-08-14 | End: 2025-08-14

## 2025-08-14 RX ORDER — ROCURONIUM BROMIDE 10 MG/ML
INJECTION, SOLUTION INTRAVENOUS
Status: DISCONTINUED | OUTPATIENT
Start: 2025-08-14 | End: 2025-08-14

## 2025-08-14 RX ADMIN — ROCURONIUM BROMIDE 100 MG: 10 INJECTION, SOLUTION INTRAVENOUS at 08:08

## 2025-08-14 RX ADMIN — ONDANSETRON 4 MG: 2 INJECTION, SOLUTION INTRAMUSCULAR; INTRAVENOUS at 09:08

## 2025-08-14 RX ADMIN — DEXAMETHASONE SODIUM PHOSPHATE 4 MG: 4 INJECTION, SOLUTION INTRA-ARTICULAR; INTRALESIONAL; INTRAMUSCULAR; INTRAVENOUS; SOFT TISSUE at 08:08

## 2025-08-14 RX ADMIN — SODIUM CHLORIDE, SODIUM LACTATE, POTASSIUM CHLORIDE, AND CALCIUM CHLORIDE: .6; .31; .03; .02 INJECTION, SOLUTION INTRAVENOUS at 06:08

## 2025-08-14 RX ADMIN — CALCIUM CHLORIDE 0.5 G: 100 INJECTION, SOLUTION INTRAVENOUS at 08:08

## 2025-08-14 RX ADMIN — FENTANYL CITRATE 100 MCG: 50 INJECTION INTRAMUSCULAR; INTRAVENOUS at 08:08

## 2025-08-14 RX ADMIN — VASOPRESSIN 2 UNITS: 20 INJECTION INTRAVENOUS at 08:08

## 2025-08-14 RX ADMIN — PROPOFOL 100 MG: 10 INJECTION, EMULSION INTRAVENOUS at 08:08

## 2025-08-14 RX ADMIN — LIDOCAINE HYDROCHLORIDE 100 MG: 20 INJECTION, SOLUTION INTRAVENOUS at 08:08

## 2025-08-14 RX ADMIN — ACETAMINOPHEN 1000 MG: 10 INJECTION, SOLUTION INTRAVENOUS at 08:08

## 2025-08-14 RX ADMIN — SUGAMMADEX 200 MG: 100 INJECTION, SOLUTION INTRAVENOUS at 09:08

## 2025-08-14 RX ADMIN — PHENYLEPHRINE HYDROCHLORIDE 100 MCG: 10 INJECTION INTRAVENOUS at 08:08
